# Patient Record
Sex: FEMALE | Race: ASIAN | NOT HISPANIC OR LATINO | Employment: PART TIME | URBAN - METROPOLITAN AREA
[De-identification: names, ages, dates, MRNs, and addresses within clinical notes are randomized per-mention and may not be internally consistent; named-entity substitution may affect disease eponyms.]

---

## 2017-07-28 ENCOUNTER — OFFICE VISIT - HEALTHEAST (OUTPATIENT)
Dept: FAMILY MEDICINE | Facility: CLINIC | Age: 42
End: 2017-07-28

## 2017-07-28 DIAGNOSIS — Z00.129 ROUTINE INFANT OR CHILD HEALTH CHECK: ICD-10-CM

## 2017-07-28 DIAGNOSIS — R10.31 RLQ ABDOMINAL PAIN: ICD-10-CM

## 2017-07-28 LAB
CHOLEST SERPL-MCNC: 175 MG/DL
FASTING STATUS PATIENT QL REPORTED: YES
HBA1C MFR BLD: 5.7 % (ref 3.5–6)
HDLC SERPL-MCNC: 43 MG/DL
LDLC SERPL CALC-MCNC: 108 MG/DL
TRIGL SERPL-MCNC: 120 MG/DL

## 2017-07-28 ASSESSMENT — MIFFLIN-ST. JEOR: SCORE: 1301.33

## 2017-08-03 ENCOUNTER — HOSPITAL ENCOUNTER (OUTPATIENT)
Dept: MAMMOGRAPHY | Facility: HOSPITAL | Age: 42
Discharge: HOME | End: 2017-08-03
Attending: FAMILY MEDICINE

## 2017-08-03 ENCOUNTER — HOSPITAL ENCOUNTER (OUTPATIENT)
Dept: ULTRASOUND IMAGING | Facility: HOSPITAL | Age: 42
Discharge: HOME | End: 2017-08-03
Attending: FAMILY MEDICINE

## 2017-08-03 DIAGNOSIS — Z12.31 VISIT FOR SCREENING MAMMOGRAM: ICD-10-CM

## 2017-08-03 DIAGNOSIS — R10.31 RLQ ABDOMINAL PAIN: ICD-10-CM

## 2017-08-03 LAB
HPV INTERPRETATION - HISTORICAL: NORMAL
HPV INTERPRETER - HISTORICAL: NORMAL

## 2017-08-04 LAB
BKR LAB AP ABNORMAL BLEEDING: NO
BKR LAB AP BIRTH CONTROL/HORMONES: NORMAL
BKR LAB AP CERVICAL APPEARANCE: NORMAL
BKR LAB AP GYN ADEQUACY: NORMAL
BKR LAB AP GYN INTERPRETATION: NORMAL
BKR LAB AP HPV REFLEX: NORMAL
BKR LAB AP LMP: NORMAL
BKR LAB AP PATIENT STATUS: NO
BKR LAB AP PREVIOUS ABNORMAL: NO
BKR LAB AP PREVIOUS NORMAL: NORMAL
HIGH RISK?: NO
PATH REPORT.COMMENTS IMP SPEC: NORMAL
RESULT FLAG (HE HISTORICAL CONVERSION): NORMAL

## 2017-10-17 ENCOUNTER — AMBULATORY - HEALTHEAST (OUTPATIENT)
Dept: NURSING | Facility: CLINIC | Age: 42
End: 2017-10-17

## 2017-10-17 DIAGNOSIS — Z23 IMMUNIZATION DUE: ICD-10-CM

## 2019-12-06 ENCOUNTER — AMBULATORY - HEALTHEAST (OUTPATIENT)
Dept: NURSING | Facility: CLINIC | Age: 44
End: 2019-12-06

## 2021-05-28 ENCOUNTER — HOSPITAL ENCOUNTER (OUTPATIENT)
Dept: MAMMOGRAPHY | Facility: CLINIC | Age: 46
Discharge: HOME | End: 2021-05-28
Attending: OBSTETRICS & GYNECOLOGY

## 2021-05-28 DIAGNOSIS — Z12.31 VISIT FOR SCREENING MAMMOGRAM: ICD-10-CM

## 2021-05-29 ENCOUNTER — RECORDS - HEALTHEAST (OUTPATIENT)
Dept: ADMINISTRATIVE | Facility: CLINIC | Age: 46
End: 2021-05-29

## 2021-05-31 VITALS — BODY MASS INDEX: 28.52 KG/M2 | WEIGHT: 155 LBS | HEIGHT: 62 IN

## 2021-06-03 ENCOUNTER — RECORDS - HEALTHEAST (OUTPATIENT)
Dept: ADMINISTRATIVE | Facility: CLINIC | Age: 46
End: 2021-06-03

## 2021-06-12 NOTE — PROGRESS NOTES
Assessment/ Plan     1. Routine infant or child health check  As far as healthcare maintenance, patient states she is due for Pap smear today.  She is up-to-date on immunizations.  She does not think she has ever had blood work done before, so we will do fasting labs.  Her mother  of unknown health problems at an early age in [facility].  Patient is not sure if she had hypertension or diabetes.  Will screen for diabetes and high cholesterol.  - Comprehensive Metabolic Panel  - Glycosylated Hemoglobin A1c  - Gynecologic Cytology (PAP Smear)  - HM2(CBC w/o Differential)  - Lipid Cascade  - Thyroid Stimulating Hormone (TSH)  - Vitamin D, Total (25-Hydroxy)    2. RLQ abdominal pain  Patient has had several month history of right lower quadrant abdominal pain/pelvic pain.  She states she has had ovarian cyst in the past.  Will check an ultrasound with further plan pending those results.  - US Pelvis With Transvaginal Non OB; Future  - Comprehensive Metabolic Panel    Subjective:     Rd Hyman is a 42 y.o. female who presents for an annual exam.  Overall, she is doing quite well.  It has been 4 years since I last saw her.  Had a daughter who is now 3 years old.  She had been dealing with some infertility.  She states she feels well other than some right lower quadrant/pelvic pain.  She states it feels very deep.  It tends to be worse around the time of her menses.  She does get a regular period every month.  She is not on any birth control.  She does not think that she could be pregnant.  She denies any dysuria, urgency, or frequency.  No vaginal discharge or irritation.  No new sexual partners.  She has never had fasting blood work done.  She states both her parents  young.  Her father was a general in [facility] and was murdered when her mother was pregnant with her.  Her mom  fairly young in [facility] of undiagnosed medical issues.  Patient is a non-smoker.    Healthy Habits:   Regular Exercise: Yes  Sunscreen Use:  No  Healthy Diet: Yes  Dental Visits Regularly: Yes  Seat Belt: Yes  Sexually active: Yes  Self Breast Exam Monthly:No  Colonoscopy: N/A  Lipid Profile: Yes  Glucose Screen: Yes  Prevention of Osteoporosis: N/A  Last Dexa: N/A        Immunization History   Administered Date(s) Administered     Influenza, seasonal,quad inj 36+ mos 11/16/2016     Influenza, seasonal,quad inj 6-35 mos 10/03/2011, 01/04/2013, 11/21/2014     Tdap 01/21/2014     Immunization status: up to date and documented.     Gynecologic History  Patient's last menstrual period was 07/14/2017 (approximate).  Contraception: none  Last Pap: unknown.    OB History   No data available       Current Outpatient Prescriptions   Medication Sig Dispense Refill     PRENATAL VITS W-CA,FE,FA,<1MG, (PRENATAL VITAMIN ORAL) Take 1 tablet by mouth daily.       No current facility-administered medications for this visit.      Past Medical History:   Diagnosis Date     Infertility      Past Surgical History:   Procedure Laterality Date     DILATION AND CURETTAGE OF UTERUS  06/10/2014     DILATION AND CURETTAGE OF UTERUS N/A 6/10/2014    Procedure: DILATION AND CURETTAGE;  Surgeon: Toni Shepard MD;  Location: [facility];  Service:      Review of patient's allergies indicates no known allergies.  No family history on file.  Social History     Social History     Marital status:      Spouse name: N/A     Number of children: N/A     Years of education: N/A     Occupational History     Not on file.     Social History Main Topics     Smoking status: Never Smoker     Smokeless tobacco: Never Used     Alcohol use Not on file     Drug use: Not on file     Sexual activity: Not on file     Other Topics Concern     Not on file     Social History Narrative       Review of Systems  General:  Denies problems  Eyes:  Denies problems  Ears/Nose/Throat:  Denies problems  Cardiovascular:  Denies problems  Respiratory:  Denies problems  Gastrointestinal:  Denies  "problems  Genitourinary:  Denies problems  Musculoskeletal:  Denies problems  Skin:  Denies problems  Neurologic:  Denies problems  Psychiatric:  Denies problems  Endocrine:  Denies problems  Heme/Lymphatic:  Denies problems  Allergic/Immunologic:  Denies problems    Objective:      Vitals:    07/28/17 0758   BP: 100/66   Patient Site: Right Arm   Patient Position: Sitting   Cuff Size: Adult Regular   Pulse: 72   Resp: 16   Temp: 97.8  F (36.6  C)   TempSrc: Oral   Weight: 155 lb (70.3 kg)   Height: 5' 2\" (1.575 m)       Physical Exam:  General Appearance: Alert, cooperative, no distress, appears stated age  Head: Normocephalic, without obvious abnormality, atraumatic  Eyes: PERRL, conjunctiva/corneas clear, EOM's intact  Ears: Normal TM's and external ear canals, both ears  Nose: Nares normal, septum midline,mucosa normal, no drainage  Throat: Lips, mucosa, and tongue normal; teeth and gums normal  Neck: Supple, symmetrical, trachea midline, no adenopathy; thyroid: not enlarged, symmetric, no tenderness/mass/nodules; no carotid bruit  Back: Symmetric, no curvature, ROM normal, no CVA tenderness  Lungs: Clear to auscultation bilaterally, respirations unlabored  Breasts: No breast masses, tenderness, asymmetry, or nipple discharge  Heart: Regular rate and rhythm, S1 and S2 normal, no murmur, rub, or gallop, Abdomen: Soft, non-tender, bowel sounds active all four quadrants,  no masses, no organomegaly  Pelvic: Normally developed genitalia with no external lesions or eruptions. Vagina and cervix show no lesions, inflammation, discharge or tenderness. Uterus normal to palpation.  No adnexal mass. mild tenderness right pelvic area.  Extremities: Extremities normal, atraumatic, no cyanosis or edema  Skin: Skin color, texture, turgor normal, no rashes or lesions  Lymph nodes: Cervical, supraclavicular, and axillary nodes normal  Neurologic: Normal        The following high BMI interventions were performed this visit: " encouragement to exercise and dietary needs education    Results for orders placed or performed in visit on 07/28/17   Glycosylated Hemoglobin A1c   Result Value Ref Range    Hemoglobin A1c 5.7 3.5 - 6.0 %   HM2(CBC w/o Differential)   Result Value Ref Range    WBC 8.5 4.0 - 11.0 thou/uL    RBC 4.69 3.80 - 5.40 mill/uL    Hemoglobin 13.2 12.0 - 16.0 g/dL    Hematocrit 39.9 35.0 - 47.0 %    MCV 85 80 - 100 fL    MCH 28.1 27.0 - 34.0 pg    MCHC 33.1 32.0 - 36.0 g/dL    RDW 12.1 11.0 - 14.5 %    Platelets 202 140 - 440 thou/uL    MPV 8.7 7.0 - 10.0 fL       Kellie Torres MD    This note has been dictated using voice recognition software. Any grammatical or context distortions are unintentional and inherent to the software

## 2021-06-26 ENCOUNTER — HEALTH MAINTENANCE LETTER (OUTPATIENT)
Age: 46
End: 2021-06-26

## 2021-10-16 ENCOUNTER — HEALTH MAINTENANCE LETTER (OUTPATIENT)
Age: 46
End: 2021-10-16

## 2022-04-05 ENCOUNTER — TELEPHONE (OUTPATIENT)
Dept: FAMILY MEDICINE | Facility: CLINIC | Age: 47
End: 2022-04-05
Payer: COMMERCIAL

## 2022-04-05 DIAGNOSIS — Z11.1 SCREENING EXAMINATION FOR PULMONARY TUBERCULOSIS: Primary | ICD-10-CM

## 2022-04-05 NOTE — TELEPHONE ENCOUNTER
Reason for Call: Request for an order or referral:    Order or referral being requested: order    Date needed: as soon as possible    Has the patient been seen by the PCP for this problem? YES    Additional comments: Pt is scheduled for a mantoux test scheduled for 4/6 and she would like to get the blood test instead. She will need an order to switch her lab work for tomorrow.    Phone number Patient can be reached at:  Cell number on file:    Telephone Information:   Mobile [phone]       Best Time:  Anytime    Can we leave a detailed message on this number?  YES    Call taken on 4/5/2022 at 2:54 PM by Kaylynn Lopez

## 2022-04-06 ENCOUNTER — LAB (OUTPATIENT)
Dept: LAB | Facility: CLINIC | Age: 47
End: 2022-04-06
Payer: COMMERCIAL

## 2022-04-06 DIAGNOSIS — Z11.1 SCREENING EXAMINATION FOR PULMONARY TUBERCULOSIS: ICD-10-CM

## 2022-04-06 PROCEDURE — 86481 TB AG RESPONSE T-CELL SUSP: CPT

## 2022-04-06 PROCEDURE — 36415 COLL VENOUS BLD VENIPUNCTURE: CPT

## 2022-04-08 ENCOUNTER — TELEPHONE (OUTPATIENT)
Dept: FAMILY MEDICINE | Facility: CLINIC | Age: 47
End: 2022-04-08
Payer: COMMERCIAL

## 2022-04-08 LAB
GAMMA INTERFERON BACKGROUND BLD IA-ACNC: 0.21 IU/ML
M TB IFN-G BLD-IMP: POSITIVE
M TB IFN-G CD4+ BCKGRND COR BLD-ACNC: 9.79 IU/ML
MITOGEN IGNF BCKGRD COR BLD-ACNC: 0.01 IU/ML
MITOGEN IGNF BCKGRD COR BLD-ACNC: 2.24 IU/ML
QUANTIFERON MITOGEN: 10 IU/ML
QUANTIFERON NIL TUBE: 0.21 IU/ML
QUANTIFERON TB1 TUBE: 0.22 IU/ML
QUANTIFERON TB2 TUBE: 2.45

## 2022-04-08 NOTE — TELEPHONE ENCOUNTER
----- Message from Kellie Torres sent at 4/8/2022 12:59 PM CDT -----  Let pt know this is positive.  I have not seen her in almost 5 years so she needs to schedule an appointment for further evaluation.

## 2022-04-11 ENCOUNTER — TELEPHONE (OUTPATIENT)
Dept: FAMILY MEDICINE | Facility: CLINIC | Age: 47
End: 2022-04-11
Payer: COMMERCIAL

## 2022-04-11 NOTE — TELEPHONE ENCOUNTER
Reason for call:  Other   Patient called regarding (reason for call): appointment  Additional comments: Pt directed to schedule follow up regarding positive TB test, unable to find appt with PCP until 5/12, please call spouse to schedule sooner if available    Phone number to reach patient:  Other phone number:  [phone]    Best Time:  any    Can we leave a detailed message on this number?  YES    Travel screening: Positive: unable to schedule an appointment, due to positive travel screen.  Informed patient/caller that a message will be sent to the clinic; who will then call them back to discuss next steps.     Patient screened positive for: OtherTB

## 2022-04-13 NOTE — TELEPHONE ENCOUNTER
You could use one of my holds next week.  She will need a chest x-ray and repeat blood work.  Please make this a 40-minute appointment.

## 2022-04-19 ENCOUNTER — OFFICE VISIT (OUTPATIENT)
Dept: FAMILY MEDICINE | Facility: CLINIC | Age: 47
End: 2022-04-19
Payer: COMMERCIAL

## 2022-04-19 VITALS
RESPIRATION RATE: 16 BRPM | WEIGHT: 171.6 LBS | HEIGHT: 62 IN | SYSTOLIC BLOOD PRESSURE: 112 MMHG | BODY MASS INDEX: 31.58 KG/M2 | HEART RATE: 85 BPM | DIASTOLIC BLOOD PRESSURE: 76 MMHG

## 2022-04-19 DIAGNOSIS — R76.12 POSITIVE QUANTIFERON-TB GOLD TEST: Primary | ICD-10-CM

## 2022-04-19 PROCEDURE — 36415 COLL VENOUS BLD VENIPUNCTURE: CPT | Performed by: FAMILY MEDICINE

## 2022-04-19 PROCEDURE — 99203 OFFICE O/P NEW LOW 30 MIN: CPT | Performed by: FAMILY MEDICINE

## 2022-04-19 PROCEDURE — 86481 TB AG RESPONSE T-CELL SUSP: CPT | Performed by: FAMILY MEDICINE

## 2022-04-19 RX ORDER — MULTIPLE VITAMINS W/ MINERALS TAB 9MG-400MCG
TAB ORAL DAILY
COMMUNITY

## 2022-04-19 RX ORDER — CHOLECALCIFEROL (VITAMIN D3) 25 MCG
CAPSULE ORAL
COMMUNITY

## 2022-04-19 RX ORDER — LORATADINE 10 MG/1
10 TABLET ORAL DAILY
COMMUNITY

## 2022-04-19 NOTE — LETTER
April 19, 2022      Rd Hyman  [address]  [place] [facility] [place] [address]        To Whom It May Concern:    Rd Hyman  was seen on 4/19/22.  She has a positive quantiferron gold.  She is not having any symptoms and has a normal chest x-ray.  Therefore, she is not considered contagious and may return to work.      Sincerely,        Kellie Torres

## 2022-04-19 NOTE — PROGRESS NOTES
Assessment/ Plan     1. Positive QuantiFERON-TB Gold test  Paulina presents for follow-up of a positive QuantiFERON gold.  She is completely asymptomatic and her chest x-ray is normal.  I repeated the QuantiFERON gold and it came back negative.  I think her initial was a false positive.  No further work-up needed at this time.  - Quantiferon-TB Gold Plus; Future  - XR Chest 2 Views; Future  - Quantiferon-TB Gold Plus      Subjective:      Rd Hyman is a 46 year old female who presents for follow-up of a positive QuantiFERON gold test.  I had not seen her for over a year, this was requested as part of her employment.  I wanted her to come into asked some more specific questions.  She was born in [facility] [facility].  She moved here when she was about 20 years old.  She does not remember being sick as a child, but she remembers her mom telling her that her first year of life, she had a bad cough and some breathing issues.  She has last travel to [place] in .  She has not been there since then as that is when her mother .  Her father  before she was born.  She currently does not any symptoms at all.  She does not remember having a cough or fever anytime in the recent past.  This was the first QuantiFERON gold test that she has had as she is now working in home care.  She does feel like when she first came to the country that she has had some type of screening for tuberculosis and does not remember anyone ever telling her it was positive.  She never had to take any pills or medication for prolonged period of time.  She is a non-smoker.    Relevant past medical, family, surgical, and social history reviewed with patient, unless noted in HPI, not pertinent for this visit.  Medications were discussed and reconciled.   Review of Systems   A 12 point comprehensive review of systems was negative except as noted.      Current Outpatient Medications   Medication Sig Dispense Refill     Cholecalciferol (VITAMIN D-3) 25 MCG (1000 UT)  "CAPS        loratadine (CLARITIN) 10 MG tablet Take 10 mg by mouth daily       Misc Natural Products (FOCUSED MIND PO)        multivitamin w/minerals (MULTI-VITAMIN) tablet Take by mouth daily           Objective:     /76   Pulse 85   Resp 16   Ht 1.575 m (5' 2\")   Wt 77.8 kg (171 lb 9.6 oz)   LMP  (LMP Unknown)   BMI 31.39 kg/m      Body mass index is 31.39 kg/m .       General appearance: alert, appears stated age and cooperative    Lungs: clear to auscultation bilaterally  Heart: regular rate and rhythm, S1, S2 normal, no murmur, click, rub or gallop       Chest x-ray: Personally reviewed, negative for infiltrate or any other abnormalities.  No results found for this or any previous visit (from the past 168 hour(s)).       This note has been dictated using voice recognition software. Any grammatical or context distortions are unintentional and inherent to the software  Answers for HPI/ROS submitted by the patient on 4/19/2022  How many servings of fruits and vegetables do you eat daily?: 2-3  On average, how many sweetened beverages do you drink each day (Examples: soda, juice, sweet tea, etc.  Do NOT count diet or artificially sweetened beverages)?: 1  What is the reason for your visit today?: TB screening  When did your symptoms begin?: More than a month      "

## 2022-04-21 LAB
GAMMA INTERFERON BACKGROUND BLD IA-ACNC: 0.18 IU/ML
M TB IFN-G BLD-IMP: NEGATIVE
M TB IFN-G CD4+ BCKGRND COR BLD-ACNC: 9.82 IU/ML
MITOGEN IGNF BCKGRD COR BLD-ACNC: -0.04 IU/ML
MITOGEN IGNF BCKGRD COR BLD-ACNC: -0.05 IU/ML
QUANTIFERON MITOGEN: 10 IU/ML
QUANTIFERON NIL TUBE: 0.18 IU/ML
QUANTIFERON TB1 TUBE: 0.14 IU/ML
QUANTIFERON TB2 TUBE: 0.13

## 2022-05-22 ENCOUNTER — HEALTH MAINTENANCE LETTER (OUTPATIENT)
Age: 47
End: 2022-05-22

## 2022-09-25 ENCOUNTER — HEALTH MAINTENANCE LETTER (OUTPATIENT)
Age: 47
End: 2022-09-25

## 2022-10-17 ENCOUNTER — OFFICE VISIT (OUTPATIENT)
Dept: FAMILY MEDICINE | Facility: CLINIC | Age: 47
End: 2022-10-17
Payer: COMMERCIAL

## 2022-10-17 VITALS
WEIGHT: 169.8 LBS | HEIGHT: 62 IN | DIASTOLIC BLOOD PRESSURE: 83 MMHG | HEART RATE: 99 BPM | RESPIRATION RATE: 16 BRPM | BODY MASS INDEX: 31.25 KG/M2 | SYSTOLIC BLOOD PRESSURE: 111 MMHG

## 2022-10-17 DIAGNOSIS — R20.0 NUMBNESS AND TINGLING IN RIGHT HAND: ICD-10-CM

## 2022-10-17 DIAGNOSIS — R20.2 NUMBNESS AND TINGLING IN RIGHT HAND: ICD-10-CM

## 2022-10-17 DIAGNOSIS — M77.11 RIGHT LATERAL EPICONDYLITIS: Primary | ICD-10-CM

## 2022-10-17 PROCEDURE — 90686 IIV4 VACC NO PRSV 0.5 ML IM: CPT | Performed by: FAMILY MEDICINE

## 2022-10-17 PROCEDURE — 90471 IMMUNIZATION ADMIN: CPT | Performed by: FAMILY MEDICINE

## 2022-10-17 PROCEDURE — 91312 COVID-19,PF,PFIZER BOOSTER BIVALENT: CPT | Performed by: FAMILY MEDICINE

## 2022-10-17 PROCEDURE — 0124A COVID-19,PF,PFIZER BOOSTER BIVALENT: CPT | Performed by: FAMILY MEDICINE

## 2022-10-17 PROCEDURE — 99213 OFFICE O/P EST LOW 20 MIN: CPT | Mod: 25 | Performed by: FAMILY MEDICINE

## 2022-10-18 NOTE — PROGRESS NOTES
Assessment/ Plan     1. Right lateral epicondylitis  Paulina presents with about a 2-month history of signs and symptoms consistent with right lateral epicondylitis.  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  We discussed conservative therapy including aggressive icing, anti-inflammatories, exercising and stretching.  I gave her handout.  She can look online if she wants to try a tennis elbow brace to see if that is helpful.  If she is not having any success over the next couple of weeks with these conservative measures, she will let me know and I would prescribe formal physical therapy.    2. Numbness and tingling in right hand  She is having little bit of numbness in her fourth and fifth fingers on the right.  This may be an ulnar neuropathy either at the wrist or elbow or possibly coming from her neck.  We will continue to monitor for now as she has no weakness.  If she has continued ongoing symptoms or worsening symptoms, we could consider an EMG to further delineate.      Subjective:      Rd Hyman is a 47 year old female who presents for arm pain.  She states for the last couple of months she has had pain in her right forearm, wrist, and elbow area.  She works 2 jobs.  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  She is constantly washing dishes and doing things with her arms.  Denies any trauma.  She feels like sometimes the pain is bad that she cannot hold things or grabs things.  She has some numbness in her fourth and fifth fingers.  She has had some neck issues as well.  She attributes this to making a bed for a client when she was doing home care, stretching in an unusual position and she heard a crack in her neck.  She just cannot has some overall just tightness in her neck.    Relevant past medical, family, surgical, and social history reviewed with patient, unless noted in HPI, not pertinent for this visit.  Medications  "were discussed and reconciled.   Review of Systems   A 12 point comprehensive review of systems was negative except as noted.      Current Outpatient Medications   Medication Sig Dispense Refill     Cholecalciferol (VITAMIN D-3) 25 MCG (1000 UT) CAPS        loratadine (CLARITIN) 10 MG tablet Take 10 mg by mouth daily       Misc Natural Products (FOCUSED MIND PO)        multivitamin w/minerals (THERA-VIT-M) tablet Take by mouth daily           Objective:     /83   Pulse 99   Resp 16   Ht 1.575 m (5' 2\")   Wt 77 kg (169 lb 12.8 oz)   BMI 31.06 kg/m      Body mass index is 31.06 kg/m .       General appearance: alert, appears stated age and cooperative  Lungs: clear to auscultation bilaterally  Heart: regular rate and rhythm, S1, S2 normal, no murmur, click, rub or gallop     Right arm: She is exquisitely tender over the lateral epicondyle.  She is little bit of tenderness over the extensor tendons of the wrist.  There is no weakness of the hand, her strength is 5 out of 5.  Is a little bit of tenderness in the ulnar notch.    No results found for this or any previous visit (from the past 168 hour(s)).       This note has been dictated using voice recognition software. Any grammatical or context distortions are unintentional and inherent to the software  "

## 2023-05-03 ENCOUNTER — HOSPITAL ENCOUNTER (EMERGENCY)
Facility: CLINIC | Age: 48
Discharge: HOME | End: 2023-05-03
Attending: EMERGENCY MEDICINE | Admitting: EMERGENCY MEDICINE
Payer: COMMERCIAL

## 2023-05-03 ENCOUNTER — NURSE TRIAGE (OUTPATIENT)
Dept: NURSING | Facility: CLINIC | Age: 48
End: 2023-05-03
Payer: COMMERCIAL

## 2023-05-03 VITALS
SYSTOLIC BLOOD PRESSURE: 119 MMHG | RESPIRATION RATE: 20 BRPM | HEIGHT: 60 IN | OXYGEN SATURATION: 96 % | HEART RATE: 89 BPM | TEMPERATURE: 98.1 F | BODY MASS INDEX: 32.79 KG/M2 | DIASTOLIC BLOOD PRESSURE: 87 MMHG | WEIGHT: 167 LBS

## 2023-05-03 DIAGNOSIS — J35.8 TONSIL STONE: ICD-10-CM

## 2023-05-03 DIAGNOSIS — J02.0 STREPTOCOCCAL PHARYNGITIS: ICD-10-CM

## 2023-05-03 LAB — GROUP A STREP BY PCR: DETECTED

## 2023-05-03 PROCEDURE — 250N000009 HC RX 250: Performed by: EMERGENCY MEDICINE

## 2023-05-03 PROCEDURE — 99283 EMERGENCY DEPT VISIT LOW MDM: CPT | Performed by: EMERGENCY MEDICINE

## 2023-05-03 PROCEDURE — 87651 STREP A DNA AMP PROBE: CPT | Performed by: EMERGENCY MEDICINE

## 2023-05-03 PROCEDURE — 99284 EMERGENCY DEPT VISIT MOD MDM: CPT | Performed by: EMERGENCY MEDICINE

## 2023-05-03 PROCEDURE — 250N000013 HC RX MED GY IP 250 OP 250 PS 637: Performed by: EMERGENCY MEDICINE

## 2023-05-03 RX ORDER — AMOXICILLIN 500 MG/1
500 CAPSULE ORAL 2 TIMES DAILY
Qty: 30 CAPSULE | Refills: 0 | Status: SHIPPED | OUTPATIENT
Start: 2023-05-03 | End: 2023-05-03

## 2023-05-03 RX ORDER — AMOXICILLIN 500 MG/1
500 CAPSULE ORAL 2 TIMES DAILY
Qty: 30 CAPSULE | Refills: 0 | Status: SHIPPED | OUTPATIENT
Start: 2023-05-03 | End: 2024-03-18

## 2023-05-03 RX ORDER — DEXAMETHASONE SODIUM PHOSPHATE 4 MG/ML
10 VIAL (ML) INJECTION ONCE
Status: COMPLETED | OUTPATIENT
Start: 2023-05-03 | End: 2023-05-03

## 2023-05-03 RX ADMIN — DEXAMETHASONE SODIUM PHOSPHATE 10 MG: 4 INJECTION, SOLUTION INTRAMUSCULAR; INTRAVENOUS at 18:46

## 2023-05-03 RX ADMIN — PHENOL 1 ML: 1.5 LIQUID ORAL at 19:55

## 2023-05-03 ASSESSMENT — ACTIVITIES OF DAILY LIVING (ADL)
ADLS_ACUITY_SCORE: 35
ADLS_ACUITY_SCORE: 35

## 2023-05-03 NOTE — TELEPHONE ENCOUNTER
"Triage Call:    Patient's spouse calling to report sore throat.  Verbal consent to communicate given by patient.  Patient has had a sore throat for a couple of days. She has a fever up to 101.  He reports seeing a large white \"tonsil stone\".  Patient reports difficulty swallowing saliva due to swelling.  Denies difficulty breathing.    Stephanie Gann RN  05/03/23 5:09 PM  [facility] Nurse Advisor    Reason for Disposition    [1] Drooling or spitting out saliva (because can't swallow) AND [2] normal breathing    Additional Information    Negative: SEVERE difficulty breathing (e.g., struggling for each breath, speaks in single words, stridor)    Negative: Sounds like a life-threatening emergency to the triager    Negative: [1] Diagnosed strep throat AND [2] taking antibiotic AND [3] symptoms continue    Negative: Throat culture results, call about    Negative: Productive cough is main symptom    Negative: Non-productive cough is main symptom    Negative: Hoarseness is main symptom    Negative: Runny nose is main symptom    Negative: Uvula swelling is main symptom    Protocols used: SORE THROAT-A-AH      "

## 2023-05-03 NOTE — ED TRIAGE NOTES
Patient states sore thrao and fever with occasional HA that started Sunday night     Triage Assessment     Row Name 05/03/23 5774       Triage Assessment (Adult)    Airway WDL WDL       Respiratory WDL    Respiratory WDL WDL       Skin Circulation/Temperature WDL    Skin Circulation/Temperature WDL WDL       Cardiac WDL    Cardiac WDL WDL       Peripheral/Neurovascular WDL    Peripheral Neurovascular WDL WDL       Cognitive/Neuro/Behavioral WDL    Cognitive/Neuro/Behavioral WDL WDL

## 2023-05-03 NOTE — ED TRIAGE NOTES
Took Tylenol at 1500     Triage Assessment     Row Name 05/03/23 1750       Triage Assessment (Adult)    Airway WDL WDL       Respiratory WDL    Respiratory WDL WDL       Skin Circulation/Temperature WDL    Skin Circulation/Temperature WDL WDL       Cardiac WDL    Cardiac WDL WDL       Peripheral/Neurovascular WDL    Peripheral Neurovascular WDL WDL       Cognitive/Neuro/Behavioral WDL    Cognitive/Neuro/Behavioral WDL WDL

## 2023-05-03 NOTE — ED PROVIDER NOTES
History     Chief Complaint   Patient presents with     Pharyngitis     HPI  Rd Hyman is a 47 year old female with no significant past medical history presents emergency department complaining of low-grade fevers and sore throat.  Patient states she is had previous stone in her tonsils and states it feels like there is no one there.  She has pain with with swallowing.  She has not had any ear pain cough chest pain abdominal pain back pain focal numbness weakness any extremity other symptoms.  She is able to drink water but has trouble swallowing food.    Allergies:  No Known Allergies    Problem List:    There are no problems to display for this patient.       Past Medical History:    No past medical history on file.    Past Surgical History:    No past surgical history on file.    Family History:    No family history on file.    Social History:  Marital Status:   [2]        Medications:    No current outpatient medications on file.        Review of Systems  All systems reviewed and other than pertinent positives and negatives in HPI all other systems are negative.  Physical Exam   BP: 119/87  Pulse: 89  Temp: 98.1  F (36.7  C)  Resp: 20  Height: 152.4 cm (5')  Weight: 75.8 kg (167 lb)  SpO2: 96 %      Physical Exam  Vitals and nursing note reviewed.   Constitutional:       General: She is not in acute distress.     Appearance: She is well-developed. She is not ill-appearing, toxic-appearing or diaphoretic.   HENT:      Head: Normocephalic and atraumatic.      Right Ear: Tympanic membrane normal.      Left Ear: Tympanic membrane normal.      Nose: No congestion.      Mouth/Throat:      Pharynx: No uvula swelling.      Comments: Posterior pharynx with moderate erythema edema question exudate in the left tonsil versus tonsillar stone.  No obvious evidence of abscess.  Neck:      Comments: Positive anterior superior cervical adenopathy left greater than right.  Cardiovascular:      Rate and Rhythm: Normal  rate and regular rhythm.      Pulses: Normal pulses.      Heart sounds: Normal heart sounds.   Pulmonary:      Effort: Pulmonary effort is normal.      Breath sounds: Normal breath sounds. No stridor. No wheezing or rhonchi.   Abdominal:      General: Abdomen is flat. Bowel sounds are normal. There is no distension.      Palpations: Abdomen is soft.      Tenderness: There is no abdominal tenderness. There is no right CVA tenderness or left CVA tenderness.   Musculoskeletal:         General: No swelling or tenderness. Normal range of motion.      Cervical back: Normal range of motion and neck supple.      Right lower leg: No edema.      Left lower leg: No edema.   Skin:     General: Skin is warm and dry.      Capillary Refill: Capillary refill takes less than 2 seconds.      Findings: No rash.   Neurological:      General: No focal deficit present.      Mental Status: She is alert and oriented to person, place, and time.   Psychiatric:         Mood and Affect: Mood normal.         ED Course                 Procedures              Critical Care time:  none               Results for orders placed or performed during the hospital encounter of 05/03/23 (from the past 24 hour(s))   Group A Streptococcus PCR Throat Swab    Specimen: Throat; Swab   Result Value Ref Range    Group A strep by PCR Detected (A) Not Detected    Narrative    The Xpert Xpress Strep A test, performed on the [employer], is a rapid, qualitative in vitro diagnostic test for the detection of Streptococcus pyogenes (Group A ß-hemolytic Streptococcus, Strep A) in throat swab specimens from patients with signs and symptoms of pharyngitis. The Xpert Xpress Strep A test can be used as an aid in the diagnosis of Group A Streptococcal pharyngitis. The assay is not intended to monitor treatment for Group A Streptococcus infections. The Xpert Xpress Strep A test utilizes an automated real-time polymerase chain reaction (PCR) to detect  Streptococcus pyogenes DNA.       Medications   dexamethasone (DECADRON) injectable solution used ORALLY 10 mg (10 mg Oral $Given 5/3/23 4666)       Assessments & Plan (with Medical Decision Making) records were reviewed.  Including past medical history medications and allergies.  Strep screen was obtained.  Patient was given Decadron.  Patient has not had any recent clinical visits.  Strep screen was positive.  I tried to numb the patient's posterior throat up to get a better view of the possible stone versus exudate but patient is gag reflex continue to be strong.  I discussed the case with Dr. Hernandez with ENT and he stated we should treat the patient for the strep and have her follow-up in clinic for further assessment and care.  He did not feel this was an emergent situation.  She should gargle with salt water try throat lozenges and return if difficulty swallowing fevers not controlled with ibuprofen or Tylenol shortness of breath or other symptoms present.  Patient is feeling better at this time and feels comfortable with this plan.     I have reviewed the nursing notes.    I have reviewed the findings, diagnosis, plan and need for follow up with the patient.           Discharge Medication List as of 5/3/2023  9:19 PM      START taking these medications    Details   amoxicillin (AMOXIL) 500 MG capsule Take 1 capsule (500 mg) by mouth 2 times daily, Disp-30 capsule, R-0, [employer]             Final diagnoses:   Streptococcal pharyngitis   Tonsil stone - possible       5/3/2023   [facility] EMERGENCY DEPT     Nica, Josemanuel Chowdary MD  05/04/23 1791

## 2023-05-04 NOTE — DISCHARGE INSTRUCTIONS
Return if symptoms worsen or new symptoms develop.  Follow-up with primary care physician next available.  Drink plenty fluids.  If increased shortness of breath, worsening pain or other symptoms present please return for further evaluation and care.  Take antibiotics as directed follow-up with ENT next available.  A referral has been made.

## 2023-05-08 ENCOUNTER — OFFICE VISIT (OUTPATIENT)
Dept: FAMILY MEDICINE | Facility: CLINIC | Age: 48
End: 2023-05-08
Payer: COMMERCIAL

## 2023-05-08 ENCOUNTER — HOSPITAL ENCOUNTER (OUTPATIENT)
Dept: CT IMAGING | Facility: HOSPITAL | Age: 48
Discharge: HOME | End: 2023-05-08
Attending: FAMILY MEDICINE | Admitting: FAMILY MEDICINE
Payer: COMMERCIAL

## 2023-05-08 VITALS
DIASTOLIC BLOOD PRESSURE: 84 MMHG | WEIGHT: 165.4 LBS | BODY MASS INDEX: 32.3 KG/M2 | OXYGEN SATURATION: 97 % | RESPIRATION RATE: 20 BRPM | TEMPERATURE: 97.7 F | HEART RATE: 82 BPM | SYSTOLIC BLOOD PRESSURE: 117 MMHG

## 2023-05-08 DIAGNOSIS — M54.9 LEFT-SIDED BACK PAIN, UNSPECIFIED BACK LOCATION, UNSPECIFIED CHRONICITY: Primary | ICD-10-CM

## 2023-05-08 DIAGNOSIS — M54.9 LEFT-SIDED BACK PAIN, UNSPECIFIED BACK LOCATION, UNSPECIFIED CHRONICITY: ICD-10-CM

## 2023-05-08 LAB
ALBUMIN SERPL BCG-MCNC: 3.9 G/DL (ref 3.5–5.2)
ALBUMIN UR-MCNC: NEGATIVE MG/DL
ALP SERPL-CCNC: 92 U/L (ref 35–104)
ALT SERPL W P-5'-P-CCNC: 24 U/L (ref 10–35)
ANION GAP SERPL CALCULATED.3IONS-SCNC: 10 MMOL/L (ref 7–15)
APPEARANCE UR: ABNORMAL
AST SERPL W P-5'-P-CCNC: 19 U/L (ref 10–35)
BACTERIA #/AREA URNS HPF: ABNORMAL /HPF
BILIRUB SERPL-MCNC: 0.4 MG/DL
BILIRUB UR QL STRIP: NEGATIVE
BUN SERPL-MCNC: 11.6 MG/DL (ref 6–20)
CALCIUM SERPL-MCNC: 9.5 MG/DL (ref 8.6–10)
CHLORIDE SERPL-SCNC: 101 MMOL/L (ref 98–107)
COLOR UR AUTO: YELLOW
CREAT SERPL-MCNC: 0.71 MG/DL (ref 0.51–0.95)
DEPRECATED HCO3 PLAS-SCNC: 27 MMOL/L (ref 22–29)
ERYTHROCYTE [DISTWIDTH] IN BLOOD BY AUTOMATED COUNT: 12.2 % (ref 10–15)
GFR SERPL CREATININE-BSD FRML MDRD: >90 ML/MIN/1.73M2
GLUCOSE SERPL-MCNC: 95 MG/DL (ref 70–99)
GLUCOSE UR STRIP-MCNC: NEGATIVE MG/DL
HCT VFR BLD AUTO: 40.9 % (ref 35–47)
HGB BLD-MCNC: 13.7 G/DL (ref 11.7–15.7)
HGB UR QL STRIP: NEGATIVE
KETONES UR STRIP-MCNC: NEGATIVE MG/DL
LEUKOCYTE ESTERASE UR QL STRIP: ABNORMAL
LIPASE SERPL-CCNC: 27 U/L (ref 13–60)
MCH RBC QN AUTO: 29 PG (ref 26.5–33)
MCHC RBC AUTO-ENTMCNC: 33.5 G/DL (ref 31.5–36.5)
MCV RBC AUTO: 87 FL (ref 78–100)
NITRATE UR QL: NEGATIVE
PH UR STRIP: 6.5 [PH] (ref 5–8)
PLATELET # BLD AUTO: 252 10E3/UL (ref 150–450)
POTASSIUM SERPL-SCNC: 4.4 MMOL/L (ref 3.4–5.3)
PROT SERPL-MCNC: 7.5 G/DL (ref 6.4–8.3)
RBC # BLD AUTO: 4.72 10E6/UL (ref 3.8–5.2)
RBC #/AREA URNS AUTO: ABNORMAL /HPF
SODIUM SERPL-SCNC: 138 MMOL/L (ref 136–145)
SP GR UR STRIP: 1.01 (ref 1–1.03)
SQUAMOUS #/AREA URNS AUTO: ABNORMAL /LPF
UROBILINOGEN UR STRIP-ACNC: 0.2 E.U./DL
WBC # BLD AUTO: 12.5 10E3/UL (ref 4–11)
WBC #/AREA URNS AUTO: ABNORMAL /HPF

## 2023-05-08 PROCEDURE — 80053 COMPREHEN METABOLIC PANEL: CPT | Performed by: FAMILY MEDICINE

## 2023-05-08 PROCEDURE — 83690 ASSAY OF LIPASE: CPT | Performed by: FAMILY MEDICINE

## 2023-05-08 PROCEDURE — 74177 CT ABD & PELVIS W/CONTRAST: CPT

## 2023-05-08 PROCEDURE — 250N000011 HC RX IP 250 OP 636: Performed by: FAMILY MEDICINE

## 2023-05-08 PROCEDURE — 36415 COLL VENOUS BLD VENIPUNCTURE: CPT | Performed by: FAMILY MEDICINE

## 2023-05-08 PROCEDURE — 81001 URINALYSIS AUTO W/SCOPE: CPT | Performed by: FAMILY MEDICINE

## 2023-05-08 PROCEDURE — 96372 THER/PROPH/DIAG INJ SC/IM: CPT | Performed by: FAMILY MEDICINE

## 2023-05-08 PROCEDURE — 85027 COMPLETE CBC AUTOMATED: CPT | Performed by: FAMILY MEDICINE

## 2023-05-08 PROCEDURE — 99214 OFFICE O/P EST MOD 30 MIN: CPT | Mod: 25 | Performed by: FAMILY MEDICINE

## 2023-05-08 RX ORDER — CYCLOBENZAPRINE HCL 5 MG
5 TABLET ORAL 3 TIMES DAILY PRN
Qty: 15 TABLET | Refills: 0 | Status: SHIPPED | OUTPATIENT
Start: 2023-05-08 | End: 2024-03-18

## 2023-05-08 RX ORDER — IOPAMIDOL 755 MG/ML
90 INJECTION, SOLUTION INTRAVASCULAR ONCE
Status: COMPLETED | OUTPATIENT
Start: 2023-05-08 | End: 2023-05-08

## 2023-05-08 RX ORDER — KETOROLAC TROMETHAMINE 30 MG/ML
30 INJECTION, SOLUTION INTRAMUSCULAR; INTRAVENOUS ONCE
Status: COMPLETED | OUTPATIENT
Start: 2023-05-08 | End: 2023-05-08

## 2023-05-08 RX ADMIN — IOPAMIDOL 90 ML: 755 INJECTION, SOLUTION INTRAVENOUS at 14:29

## 2023-05-08 RX ADMIN — KETOROLAC TROMETHAMINE 30 MG: 30 INJECTION, SOLUTION INTRAMUSCULAR; INTRAVENOUS at 13:15

## 2023-05-08 NOTE — PROGRESS NOTES
Assessment & Plan     Left-sided back pain, unspecified back location, unspecified chronicity  Probably MSK in nature. Multiple etiologies were evaluated including kidneys stones diverticulitis, pancreatitis but work up for these including urgent ct scan was normal except for mildly elevated white blood count. Conservative treatment measures discussed. Use of OTC  meds. discussed and use of flexeril. toradol improved pain but not gone.   - UA Macroscopic with reflex to Microscopic and Culture  - ketorolac (TORADOL) injection 30 mg  - UA Microscopic with Reflex to Culture  - CBC with platelets  - Comprehensive metabolic panel  - Lipase  - CT Abdomen Pelvis w Contrast  - CBC with platelets  - Comprehensive metabolic panel  - Lipase  - cyclobenzaprine (FLEXERIL) 5 MG tablet  Dispense: 15 tablet; Refill: 0       [id number]}    Return in about 3 days (around 5/11/2023) for follow up with your regular clinic if needed.  Discussed sooner follow up if nausea or vomiting or fever develops or worsening pain.   Cameron Delarosa MD  [facility]    ------------------------------------------------------------------------  Subjective     Rd Hyman presents to clinic today for the following health issues:  chief complaint  HPI  Acute onset of left flank pain that over time moved around to the left upper abdomen with sharp pain worse when moving/twisting/changing position. No associated nausea or vomiting nor urinary symptoms. Had one loose nonbloody stool today. No rash noted. No similar pain in the past. No injury nor overuse.       Review of Systems        Objective    /84 (BP Location: Right arm, Patient Position: Sitting, Cuff Size: Adult Regular)   Pulse 82   Temp 97.7  F (36.5  C) (Oral)   Resp 20   Wt 75 kg (165 lb 6.4 oz)   LMP 05/01/2023 (Exact Date)   SpO2 97%   BMI 32.30 kg/m    Physical Exam   GENERAL: healthy, alert and no distress  RESP: lungs clear to auscultation - no rales, rhonchi or wheezes  CV:  regular rate and rhythm, normal S1 S2, no S3 or S4, no murmur, click or rub, no peripheral edema and peripheral pulses strong  ABDOMEN: soft, nontender, no hepatosplenomegaly, no masses and bowel sounds normal  MS: no gross musculoskeletal defects noted, she has tenderness to palpation at the cva area on the left   SKIN: no suspicious lesions or rashes    Results for orders placed or performed during the hospital encounter of 05/08/23   CT Abdomen Pelvis w Contrast     Status: None    Narrative    EXAM: CT ABDOMEN PELVIS W CONTRAST  LOCATION: [facility]  DATE/TIME: 5/8/2023 2:25 PM CDT    INDICATION: left sdied flank pain and abd pain. acute onset  COMPARISON: 08/21/2006  TECHNIQUE: CT scan of the abdomen and pelvis was performed following injection of IV contrast. Multiplanar reformats were obtained. Dose reduction techniques were used.  CONTRAST: 90ml IV Isovue 370    FINDINGS:   LOWER CHEST: Normal.    HEPATOBILIARY: Normal.    PANCREAS: Normal.    SPLEEN: Normal.    ADRENAL GLANDS: Normal.    KIDNEYS/BLADDER: Normal left kidney. There is a 0.4 cm nonobstructing right renal calculus.    BOWEL: Normal. Normal appendix.    LYMPH NODES: Normal.    VASCULATURE: Unremarkable.    PELVIC ORGANS: Normal.    MUSCULOSKELETAL: Normal.      Impression    IMPRESSION:   1.  Nonobstructing 0.4 cm right renal calculus without hydronephrosis.   Results for orders placed or performed in visit on 05/08/23   UA Macroscopic with reflex to Microscopic and Culture     Status: Abnormal    Specimen: Urine, Clean Catch   Result Value Ref Range    Color Urine Yellow Colorless, Straw, Light Yellow, Yellow    Appearance Urine Slightly Cloudy (A) Clear    Glucose Urine Negative Negative mg/dL    Bilirubin Urine Negative Negative    Ketones Urine Negative Negative mg/dL    Specific Gravity Urine 1.010 1.005 - 1.030    Blood Urine Negative Negative    pH Urine 6.5 5.0 - 8.0    Protein Albumin Urine Negative Negative  mg/dL    Urobilinogen Urine 0.2 0.2, 1.0 E.U./dL    Nitrite Urine Negative Negative    Leukocyte Esterase Urine Trace (A) Negative   UA Microscopic with Reflex to Culture     Status: Abnormal   Result Value Ref Range    Bacteria Urine Few (A) None Seen /HPF    RBC Urine None Seen 0-2 /HPF /HPF    WBC Urine 0-5 0-5 /HPF /HPF    Squamous Epithelials Urine Moderate (A) None Seen /LPF    Narrative    Urine Culture not indicated   CBC with platelets     Status: Abnormal   Result Value Ref Range    WBC Count 12.5 (H) 4.0 - 11.0 10e3/uL    RBC Count 4.72 3.80 - 5.20 10e6/uL    Hemoglobin 13.7 11.7 - 15.7 g/dL    Hematocrit 40.9 35.0 - 47.0 %    MCV 87 78 - 100 fL    MCH 29.0 26.5 - 33.0 pg    MCHC 33.5 31.5 - 36.5 g/dL    RDW 12.2 10.0 - 15.0 %    Platelet Count 252 150 - 450 10e3/uL   Comprehensive metabolic panel     Status: Normal   Result Value Ref Range    Sodium 138 136 - 145 mmol/L    Potassium 4.4 3.4 - 5.3 mmol/L    Chloride 101 98 - 107 mmol/L    Carbon Dioxide (CO2) 27 22 - 29 mmol/L    Anion Gap 10 7 - 15 mmol/L    Urea Nitrogen 11.6 6.0 - 20.0 mg/dL    Creatinine 0.71 0.51 - 0.95 mg/dL    Calcium 9.5 8.6 - 10.0 mg/dL    Glucose 95 70 - 99 mg/dL    Alkaline Phosphatase 92 35 - 104 U/L    AST 19 10 - 35 U/L    ALT 24 10 - 35 U/L    Protein Total 7.5 6.4 - 8.3 g/dL    Albumin 3.9 3.5 - 5.2 g/dL    Bilirubin Total 0.4 <=1.2 mg/dL    GFR Estimate >90 >60 mL/min/1.73m2   Lipase     Status: Normal   Result Value Ref Range    Lipase 27 13 - 60 U/L

## 2023-06-04 ENCOUNTER — HEALTH MAINTENANCE LETTER (OUTPATIENT)
Age: 48
End: 2023-06-04

## 2023-10-14 ENCOUNTER — HEALTH MAINTENANCE LETTER (OUTPATIENT)
Age: 48
End: 2023-10-14

## 2024-03-15 NOTE — PROGRESS NOTES
Assessment & Plan     Lumbar pain  Mild   - Physical Therapy  Referral; Future  Core is weak secondary to covid in December with large amount of weight lost while sick.    Leg weakness, bilateral  Ongoing   - Physical Therapy  Referral; Future  Would like to push for more physical activity that is guided. Patient agrees. To work on this with PT.   To also add protein shake or equivalent to a meal each day to help gain some weight back after covid.     Eyes swollen  Likely allergy   - CBC with platelets and differential; Future  - TSH with free T4 reflex; Future  - fexofenadine (ALLEGRA) 180 MG tablet; Take 1 tablet (180 mg) by mouth daily  On claritin, to try allegra daily     Pruritic disorder  To try allegra daily. No rash present. No pain. Appeared after covid. Has had shingles during this time and that has passed. Itching continues    Dry mouth  To try biotin daily as well as pushing extra fluids. Change in taste of water and food after covid. If pain presents or white substance consider fungal infection although not apparent today    Constipation  Miralax every other day until loose stools. Plenty of water.     BMI  Estimated body mass index is 26.76 kg/m  as calculated from the following:    Height as of this encounter: 1.524 m (5').    Weight as of this encounter: 62.1 kg (137 lb).   Plan to eat more daily with protein shakes     Depression Screening Follow Up        3/18/2024     6:53 AM   PHQ   PHQ-9 Total Score 12   Q9: Thoughts of better off dead/self-harm past 2 weeks Not at all           Follow Up Actions Taken  Crisis resource information provided in After Visit Summary  Patient declined referral.       Follow up if worsening or not improving     Subjective   Paulina is a 48 year old, presenting for the following health issues:  Covid Concern            3/18/2024     6:49 AM   Additional Questions   Roomed by Dwayne Rice MA   Accompanied by Self     Answers submitted by the patient for  this visit:  Patient Health Questionnaire (Submitted on 3/18/2024)  If you checked off any problems, how difficult have these problems made it for you to do your work, take care of things at home, or get along with other people?: Not difficult at all  PHQ9 TOTAL SCORE: 12      HPI       Review of Systems  Constitutional, neuro, ENT, endocrine, pulmonary, cardiac, gastrointestinal, genitourinary, musculoskeletal, integument and psychiatric systems are negative, except as otherwise noted.      Objective    /80   Pulse 101   Temp 97.5  F (36.4  C) (Tympanic)   Resp 14   Ht 1.524 m (5')   Wt 62.1 kg (137 lb)   SpO2 97%   Breastfeeding No   BMI 26.76 kg/m    Body mass index is 26.76 kg/m .    Physical Exam   GENERAL: alert and no distress  EYES: Eyes grossly normal to inspection, PERRL and conjunctivae and sclerae normal  HENT: ear canals and TM's normal, nose and mouth without ulcers or lesions  NECK: no adenopathy, no asymmetry, masses, or scars  RESP: lungs clear to auscultation - no rales, rhonchi or wheezes  CV: regular rate and rhythm, normal S1 S2, no S3 or S4, no murmur, click or rub, no peripheral edema  ABDOMEN: soft, nontender, no hepatosplenomegaly, no masses and bowel sounds normal  MS: no gross musculoskeletal defects noted, no edema  PSYCH: mentation appears normal, affect normal/bright            Signed Electronically by: Markus Dietz PA-C

## 2024-03-18 ENCOUNTER — OFFICE VISIT (OUTPATIENT)
Dept: FAMILY MEDICINE | Facility: CLINIC | Age: 49
End: 2024-03-18
Payer: COMMERCIAL

## 2024-03-18 VITALS
SYSTOLIC BLOOD PRESSURE: 127 MMHG | DIASTOLIC BLOOD PRESSURE: 80 MMHG | OXYGEN SATURATION: 97 % | RESPIRATION RATE: 14 BRPM | HEART RATE: 101 BPM | WEIGHT: 137 LBS | TEMPERATURE: 97.5 F | HEIGHT: 60 IN | BODY MASS INDEX: 26.9 KG/M2

## 2024-03-18 DIAGNOSIS — E05.90 HYPERTHYROIDISM: ICD-10-CM

## 2024-03-18 DIAGNOSIS — L29.9 PRURITIC DISORDER: ICD-10-CM

## 2024-03-18 DIAGNOSIS — R68.2 DRY MOUTH: ICD-10-CM

## 2024-03-18 DIAGNOSIS — H57.89 EYES SWOLLEN: ICD-10-CM

## 2024-03-18 DIAGNOSIS — R29.898 LEG WEAKNESS, BILATERAL: ICD-10-CM

## 2024-03-18 DIAGNOSIS — M54.50 LUMBAR PAIN: Primary | ICD-10-CM

## 2024-03-18 LAB
BASOPHILS # BLD AUTO: 0 10E3/UL (ref 0–0.2)
BASOPHILS NFR BLD AUTO: 0 %
EOSINOPHIL # BLD AUTO: 0.2 10E3/UL (ref 0–0.7)
EOSINOPHIL NFR BLD AUTO: 4 %
ERYTHROCYTE [DISTWIDTH] IN BLOOD BY AUTOMATED COUNT: 13.9 % (ref 10–15)
HCT VFR BLD AUTO: 39.1 % (ref 35–47)
HGB BLD-MCNC: 12.5 G/DL (ref 11.7–15.7)
IMM GRANULOCYTES # BLD: 0 10E3/UL
IMM GRANULOCYTES NFR BLD: 0 %
LYMPHOCYTES # BLD AUTO: 1.6 10E3/UL (ref 0.8–5.3)
LYMPHOCYTES NFR BLD AUTO: 26 %
MCH RBC QN AUTO: 26.4 PG (ref 26.5–33)
MCHC RBC AUTO-ENTMCNC: 32 G/DL (ref 31.5–36.5)
MCV RBC AUTO: 83 FL (ref 78–100)
MONOCYTES # BLD AUTO: 0.8 10E3/UL (ref 0–1.3)
MONOCYTES NFR BLD AUTO: 13 %
NEUTROPHILS # BLD AUTO: 3.4 10E3/UL (ref 1.6–8.3)
NEUTROPHILS NFR BLD AUTO: 57 %
PLATELET # BLD AUTO: 106 10E3/UL (ref 150–450)
RBC # BLD AUTO: 4.73 10E6/UL (ref 3.8–5.2)
T4 FREE SERPL-MCNC: >7.77 NG/DL (ref 0.9–1.7)
TSH SERPL DL<=0.005 MIU/L-ACNC: <0.01 UIU/ML (ref 0.3–4.2)
WBC # BLD AUTO: 6 10E3/UL (ref 4–11)

## 2024-03-18 PROCEDURE — 99214 OFFICE O/P EST MOD 30 MIN: CPT | Performed by: PHYSICIAN ASSISTANT

## 2024-03-18 PROCEDURE — 85025 COMPLETE CBC W/AUTO DIFF WBC: CPT | Performed by: PHYSICIAN ASSISTANT

## 2024-03-18 PROCEDURE — 36415 COLL VENOUS BLD VENIPUNCTURE: CPT | Performed by: PHYSICIAN ASSISTANT

## 2024-03-18 PROCEDURE — 84443 ASSAY THYROID STIM HORMONE: CPT | Performed by: PHYSICIAN ASSISTANT

## 2024-03-18 PROCEDURE — 84439 ASSAY OF FREE THYROXINE: CPT | Performed by: PHYSICIAN ASSISTANT

## 2024-03-18 RX ORDER — FEXOFENADINE HCL 180 MG/1
180 TABLET ORAL DAILY
Qty: 90 TABLET | Refills: 1 | Status: SHIPPED | OUTPATIENT
Start: 2024-03-18 | End: 2024-07-03

## 2024-03-18 ASSESSMENT — ANXIETY QUESTIONNAIRES
IF YOU CHECKED OFF ANY PROBLEMS ON THIS QUESTIONNAIRE, HOW DIFFICULT HAVE THESE PROBLEMS MADE IT FOR YOU TO DO YOUR WORK, TAKE CARE OF THINGS AT HOME, OR GET ALONG WITH OTHER PEOPLE: NOT DIFFICULT AT ALL
1. FEELING NERVOUS, ANXIOUS, OR ON EDGE: NEARLY EVERY DAY
4. TROUBLE RELAXING: SEVERAL DAYS
8. IF YOU CHECKED OFF ANY PROBLEMS, HOW DIFFICULT HAVE THESE MADE IT FOR YOU TO DO YOUR WORK, TAKE CARE OF THINGS AT HOME, OR GET ALONG WITH OTHER PEOPLE?: NOT DIFFICULT AT ALL
3. WORRYING TOO MUCH ABOUT DIFFERENT THINGS: SEVERAL DAYS
7. FEELING AFRAID AS IF SOMETHING AWFUL MIGHT HAPPEN: NOT AT ALL
GAD7 TOTAL SCORE: 9
5. BEING SO RESTLESS THAT IT IS HARD TO SIT STILL: NOT AT ALL
GAD7 TOTAL SCORE: 9
2. NOT BEING ABLE TO STOP OR CONTROL WORRYING: NEARLY EVERY DAY
GAD7 TOTAL SCORE: 9
6. BECOMING EASILY ANNOYED OR IRRITABLE: SEVERAL DAYS
7. FEELING AFRAID AS IF SOMETHING AWFUL MIGHT HAPPEN: NOT AT ALL

## 2024-03-18 ASSESSMENT — PAIN SCALES - GENERAL: PAINLEVEL: NO PAIN (0)

## 2024-03-18 ASSESSMENT — PATIENT HEALTH QUESTIONNAIRE - PHQ9
SUM OF ALL RESPONSES TO PHQ QUESTIONS 1-9: 12
10. IF YOU CHECKED OFF ANY PROBLEMS, HOW DIFFICULT HAVE THESE PROBLEMS MADE IT FOR YOU TO DO YOUR WORK, TAKE CARE OF THINGS AT HOME, OR GET ALONG WITH OTHER PEOPLE: NOT DIFFICULT AT ALL
SUM OF ALL RESPONSES TO PHQ QUESTIONS 1-9: 12

## 2024-03-21 ENCOUNTER — TELEPHONE (OUTPATIENT)
Dept: ENDOCRINOLOGY | Facility: CLINIC | Age: 49
End: 2024-03-21
Payer: COMMERCIAL

## 2024-03-21 NOTE — TELEPHONE ENCOUNTER
Patient Contacted for the patient to call back and schedule the following:    Appointment type: New Endocrine   Provider: Ashley   Return date: 4/30  Specialty phone number: [phone]  Additional appointment(s) needed: NA   Additonal Notes: Spoke to pt and gave sooner options. Pt needed an appt at 8am/earlier than that or 5pm/later than that so  could be present for visit. Added pt to wait-list with multiple providers.    Triage note:   Dx: Eyes swollen; E05.90 (ICD-10-CM) - Hyperthyroidism  Abnormal Thyroid   Endo Team: None  LABS: MAR 18 2024  TSH: 0.01  T4F: 7.7  Current visit DEC 18 2024 with Shiela Izquierdo.   CCs notified to schedule Urgent On Call or AUDREY within 1 week per protocol.   Soo Kirk, RN on 3/21/24 at 9:58 AM     Marivel Zuñiga on 3/21/2024 at 10:35 AM

## 2024-03-25 ENCOUNTER — ANCILLARY PROCEDURE (OUTPATIENT)
Dept: ULTRASOUND IMAGING | Facility: CLINIC | Age: 49
End: 2024-03-25
Attending: PHYSICIAN ASSISTANT
Payer: COMMERCIAL

## 2024-03-25 DIAGNOSIS — E05.90 HYPERTHYROIDISM: ICD-10-CM

## 2024-03-25 PROCEDURE — 76536 US EXAM OF HEAD AND NECK: CPT | Mod: TC | Performed by: RADIOLOGY

## 2024-04-02 ENCOUNTER — APPOINTMENT (OUTPATIENT)
Dept: ULTRASOUND IMAGING | Facility: HOSPITAL | Age: 49
End: 2024-04-02
Attending: EMERGENCY MEDICINE
Payer: COMMERCIAL

## 2024-04-02 ENCOUNTER — APPOINTMENT (OUTPATIENT)
Dept: CT IMAGING | Facility: HOSPITAL | Age: 49
End: 2024-04-02
Attending: EMERGENCY MEDICINE
Payer: COMMERCIAL

## 2024-04-02 ENCOUNTER — HOSPITAL ENCOUNTER (EMERGENCY)
Facility: HOSPITAL | Age: 49
Discharge: HOME | End: 2024-04-02
Attending: EMERGENCY MEDICINE | Admitting: EMERGENCY MEDICINE
Payer: COMMERCIAL

## 2024-04-02 VITALS
SYSTOLIC BLOOD PRESSURE: 123 MMHG | RESPIRATION RATE: 25 BRPM | BODY MASS INDEX: 25.11 KG/M2 | HEIGHT: 61 IN | HEART RATE: 110 BPM | DIASTOLIC BLOOD PRESSURE: 67 MMHG | WEIGHT: 133 LBS | TEMPERATURE: 98.3 F | OXYGEN SATURATION: 97 %

## 2024-04-02 DIAGNOSIS — E87.6 HYPOKALEMIA: ICD-10-CM

## 2024-04-02 DIAGNOSIS — N23 RENAL COLIC ON RIGHT SIDE: ICD-10-CM

## 2024-04-02 LAB
ALBUMIN SERPL BCG-MCNC: 3.8 G/DL (ref 3.5–5.2)
ALBUMIN UR-MCNC: NEGATIVE MG/DL
ALP SERPL-CCNC: 145 U/L (ref 40–150)
ALT SERPL W P-5'-P-CCNC: 34 U/L (ref 0–50)
ANION GAP SERPL CALCULATED.3IONS-SCNC: 17 MMOL/L (ref 7–15)
APPEARANCE UR: CLEAR
AST SERPL W P-5'-P-CCNC: 36 U/L (ref 0–45)
BILIRUB SERPL-MCNC: 0.9 MG/DL
BILIRUB UR QL STRIP: NEGATIVE
BUN SERPL-MCNC: 11.9 MG/DL (ref 6–20)
CALCIUM SERPL-MCNC: 10.8 MG/DL (ref 8.6–10)
CHLORIDE SERPL-SCNC: 104 MMOL/L (ref 98–107)
COLOR UR AUTO: COLORLESS
CREAT SERPL-MCNC: 0.37 MG/DL (ref 0.51–0.95)
DEPRECATED HCO3 PLAS-SCNC: 20 MMOL/L (ref 22–29)
EGFRCR SERPLBLD CKD-EPI 2021: >90 ML/MIN/1.73M2
ERYTHROCYTE [DISTWIDTH] IN BLOOD BY AUTOMATED COUNT: 14.3 % (ref 10–15)
GLUCOSE SERPL-MCNC: 113 MG/DL (ref 70–99)
GLUCOSE UR STRIP-MCNC: NEGATIVE MG/DL
HCG SERPL QL: NEGATIVE
HCT VFR BLD AUTO: 38.6 % (ref 35–47)
HGB BLD-MCNC: 12.6 G/DL (ref 11.7–15.7)
HGB UR QL STRIP: ABNORMAL
KETONES UR STRIP-MCNC: NEGATIVE MG/DL
LEUKOCYTE ESTERASE UR QL STRIP: NEGATIVE
MCH RBC QN AUTO: 26.4 PG (ref 26.5–33)
MCHC RBC AUTO-ENTMCNC: 32.6 G/DL (ref 31.5–36.5)
MCV RBC AUTO: 81 FL (ref 78–100)
NITRATE UR QL: NEGATIVE
PH UR STRIP: 5.5 [PH] (ref 5–7)
PLATELET # BLD AUTO: 142 10E3/UL (ref 150–450)
POTASSIUM SERPL-SCNC: 3.2 MMOL/L (ref 3.4–5.3)
PROT SERPL-MCNC: 7.1 G/DL (ref 6.4–8.3)
RBC # BLD AUTO: 4.78 10E6/UL (ref 3.8–5.2)
RBC URINE: 2 /HPF
SODIUM SERPL-SCNC: 141 MMOL/L (ref 135–145)
SP GR UR STRIP: 1 (ref 1–1.03)
SQUAMOUS EPITHELIAL: <1 /HPF
UROBILINOGEN UR STRIP-MCNC: <2 MG/DL
WBC # BLD AUTO: 9.4 10E3/UL (ref 4–11)
WBC URINE: <1 /HPF

## 2024-04-02 PROCEDURE — 81001 URINALYSIS AUTO W/SCOPE: CPT | Performed by: EMERGENCY MEDICINE

## 2024-04-02 PROCEDURE — 85027 COMPLETE CBC AUTOMATED: CPT | Performed by: EMERGENCY MEDICINE

## 2024-04-02 PROCEDURE — 250N000011 HC RX IP 250 OP 636: Performed by: EMERGENCY MEDICINE

## 2024-04-02 PROCEDURE — 36415 COLL VENOUS BLD VENIPUNCTURE: CPT | Performed by: EMERGENCY MEDICINE

## 2024-04-02 PROCEDURE — 96375 TX/PRO/DX INJ NEW DRUG ADDON: CPT

## 2024-04-02 PROCEDURE — 96361 HYDRATE IV INFUSION ADD-ON: CPT

## 2024-04-02 PROCEDURE — 96374 THER/PROPH/DIAG INJ IV PUSH: CPT | Mod: 59

## 2024-04-02 PROCEDURE — 258N000003 HC RX IP 258 OP 636: Performed by: EMERGENCY MEDICINE

## 2024-04-02 PROCEDURE — 74177 CT ABD & PELVIS W/CONTRAST: CPT

## 2024-04-02 PROCEDURE — 99285 EMERGENCY DEPT VISIT HI MDM: CPT | Mod: 25

## 2024-04-02 PROCEDURE — 80053 COMPREHEN METABOLIC PANEL: CPT | Performed by: EMERGENCY MEDICINE

## 2024-04-02 PROCEDURE — 76830 TRANSVAGINAL US NON-OB: CPT

## 2024-04-02 PROCEDURE — 250N000013 HC RX MED GY IP 250 OP 250 PS 637: Performed by: EMERGENCY MEDICINE

## 2024-04-02 PROCEDURE — 84703 CHORIONIC GONADOTROPIN ASSAY: CPT | Performed by: EMERGENCY MEDICINE

## 2024-04-02 RX ORDER — MORPHINE SULFATE 4 MG/ML
4 INJECTION, SOLUTION INTRAMUSCULAR; INTRAVENOUS ONCE
Status: DISCONTINUED | OUTPATIENT
Start: 2024-04-02 | End: 2024-04-02

## 2024-04-02 RX ORDER — IBUPROFEN 600 MG/1
600 TABLET, FILM COATED ORAL EVERY 8 HOURS PRN
Qty: 30 TABLET | Refills: 0 | Status: SHIPPED | OUTPATIENT
Start: 2024-04-02

## 2024-04-02 RX ORDER — IOPAMIDOL 755 MG/ML
71 INJECTION, SOLUTION INTRAVASCULAR ONCE
Status: COMPLETED | OUTPATIENT
Start: 2024-04-02 | End: 2024-04-02

## 2024-04-02 RX ORDER — MORPHINE SULFATE 4 MG/ML
4 INJECTION, SOLUTION INTRAMUSCULAR; INTRAVENOUS ONCE
Status: COMPLETED | OUTPATIENT
Start: 2024-04-02 | End: 2024-04-02

## 2024-04-02 RX ORDER — SODIUM CHLORIDE 9 MG/ML
INJECTION, SOLUTION INTRAVENOUS CONTINUOUS
Status: DISCONTINUED | OUTPATIENT
Start: 2024-04-02 | End: 2024-04-02 | Stop reason: HOSPADM

## 2024-04-02 RX ORDER — KETOROLAC TROMETHAMINE 15 MG/ML
15 INJECTION, SOLUTION INTRAMUSCULAR; INTRAVENOUS ONCE
Status: COMPLETED | OUTPATIENT
Start: 2024-04-02 | End: 2024-04-02

## 2024-04-02 RX ORDER — ONDANSETRON 2 MG/ML
4 INJECTION INTRAMUSCULAR; INTRAVENOUS ONCE
Status: COMPLETED | OUTPATIENT
Start: 2024-04-02 | End: 2024-04-02

## 2024-04-02 RX ORDER — POTASSIUM CHLORIDE 1500 MG/1
40 TABLET, EXTENDED RELEASE ORAL ONCE
Status: COMPLETED | OUTPATIENT
Start: 2024-04-02 | End: 2024-04-02

## 2024-04-02 RX ORDER — HYDROCODONE BITARTRATE AND ACETAMINOPHEN 5; 325 MG/1; MG/1
1 TABLET ORAL EVERY 6 HOURS PRN
Qty: 10 TABLET | Refills: 0 | Status: SHIPPED | OUTPATIENT
Start: 2024-04-02 | End: 2024-04-05

## 2024-04-02 RX ADMIN — POTASSIUM CHLORIDE 40 MEQ: 1500 TABLET, EXTENDED RELEASE ORAL at 07:19

## 2024-04-02 RX ADMIN — IOPAMIDOL 71 ML: 755 INJECTION, SOLUTION INTRAVENOUS at 05:22

## 2024-04-02 RX ADMIN — SODIUM CHLORIDE: 9 INJECTION, SOLUTION INTRAVENOUS at 03:48

## 2024-04-02 RX ADMIN — MORPHINE SULFATE 4 MG: 4 INJECTION, SOLUTION INTRAMUSCULAR; INTRAVENOUS at 03:48

## 2024-04-02 RX ADMIN — ONDANSETRON 4 MG: 2 INJECTION INTRAMUSCULAR; INTRAVENOUS at 03:48

## 2024-04-02 RX ADMIN — KETOROLAC TROMETHAMINE 15 MG: 15 INJECTION, SOLUTION INTRAMUSCULAR; INTRAVENOUS at 05:40

## 2024-04-02 ASSESSMENT — COLUMBIA-SUICIDE SEVERITY RATING SCALE - C-SSRS
1. IN THE PAST MONTH, HAVE YOU WISHED YOU WERE DEAD OR WISHED YOU COULD GO TO SLEEP AND NOT WAKE UP?: NO
2. HAVE YOU ACTUALLY HAD ANY THOUGHTS OF KILLING YOURSELF IN THE PAST MONTH?: NO
6. HAVE YOU EVER DONE ANYTHING, STARTED TO DO ANYTHING, OR PREPARED TO DO ANYTHING TO END YOUR LIFE?: NO

## 2024-04-02 ASSESSMENT — ACTIVITIES OF DAILY LIVING (ADL)
ADLS_ACUITY_SCORE: 35

## 2024-04-02 NOTE — ED PROVIDER NOTES
EMERGENCY DEPARTMENT ENCOUNTER      NAME: Rd Hyman  AGE: 48 year old female  YOB: 1975  MRN: [id number]  EVALUATION DATE & TIME: 4/2/2024  3:30 AM    PCP: Kellie Torres    ED PROVIDER: Lalit Contreras M.D.      Chief Complaint   Patient presents with    Abdominal Pain         FINAL IMPRESSION:  Right ureteral lithiasis  Right ovarian cyst  Hypokalemia      ED COURSE & MEDICAL DECISION MAKING:    Pertinent Labs & Imaging studies reviewed. (See chart for details)  48 year old female presents to the Emergency Department for evaluation of the right lower quadrant abdominal pain.  Patient arrives by EMS after being unable to tolerate the pain while driving to the ER.  Patient states pain has been intermittent for the last month but much worse for last several hours.  Describes it as severe deep achiness.  Worsens with movement.  States her last menstrual cycle was in February.  Review of records indicate CT imaging on 5/8/2023 with 0.4 cm right sided kidney stone.  However given her delayed menses primary concern is ovarian cystic disease versus potential ectopic pregnancy.  Will obtain pelvic ultrasound, baseline blood work and pregnancy test.  If ultrasound is unremarkable we will proceed with CT imaging for potential renal colic.  Patient treated symptomatically with intravenous morphine and Zofran.  Patient made NPO in case this is a surgical emergency.  Given the chronicity pain less likely to represent infectious process such as appendicitis. Patient appears non toxic with stable vitals signs.     3:38 AM I met with the patient for the initial interview and physical examination. Discussed plan for treatment and workup in the ED. Provider wore a surgical mask  4:15 AM.  Electrolytes with minimal hypokalemia and minimal hypercalcemia.  CBC unremarkable.  Pregnancy test and urine analysis pending.  5:18 AM.  Pregnancy test negative.  Ultrasound with right ovarian cyst.  Incidental fibroids.  No  evidence of torsion  5:29 AM.  Patient requesting additional pain medications.  Toradol 15 mg ordered.  CT imaging reviewed.  Patient with marked hydroureter on the right with distally you UVJ stone measuring approximately 4 mm consistent with stone noted on previous CT.  Awaiting definitive report  6 AM.  Official CT report similar.  Awaiting urine analysis.  6:40 AM.  Urine analysis sent  7 on 9 AM.  Urine analysis without evidence of infection.  Will continue outpatient management.  Patient likely to pass kidney stone without difficulty.  At the conclusion of the encounter I discussed the results of all of the tests and the disposition. The questions were answered and return precautions provided. The patient or family acknowledged understanding and was agreeable with the care plan.       MEDICATIONS GIVEN IN THE EMERGENCY:  Medications - No data to display    NEW PRESCRIPTIONS STARTED AT TODAY'S ER VISIT  Current Discharge Medication List        START taking these medications    Details   HYDROcodone-acetaminophen (NORCO) 5-325 MG tablet Take 1 tablet by mouth every 6 hours as needed for pain  Qty: 10 tablet, Refills: 0      ibuprofen (ADVIL/MOTRIN) 600 MG tablet Take 1 tablet (600 mg) by mouth every 8 hours as needed for moderate pain  Qty: 30 tablet, Refills: 0                 =================================================================    HPI        Rd Hyman is a 48 year old female with a pertient medical history of kidney stone, abdominal pain who presents to the ED for evaluation of severe right lower quadrant pain.  Patient reports intermittent pain for the last month.  Much worse today.  Describes it as severe achiness localized to the right lower quadrant.  Reports nausea without vomiting.  Denies any urinary symptoms.  States her last menstrual cycle was early February.  Typically reports very normal menstrual cycles.      REVIEW OF SYSTEMS   Constitutional:  Denies fever, chills  Respiratory:   Denies productive cough or increased work of breathing  Cardiovascular:  Denies chest pain, palpitations  GI: Reports abdominal pain, with nausea, without vomiting, or change in bowel or bladder habits   Musculoskeletal:  Denies any new muscle/joint swelling  Skin:  Denies rash   Neurologic:  Denies focal weakness  All systems negative except as marked.     PAST MEDICAL HISTORY:  Past Medical History:   Diagnosis Date    Infertility        PAST SURGICAL HISTORY:  Past Surgical History:   Procedure Laterality Date    DILATION AND CURETTAGE  06/10/2014    DILATION AND CURETTAGE N/A 6/10/2014    Procedure: DILATION AND CURETTAGE;  Surgeon: Toni Shepard MD;  [place] [facility] [place]:          CURRENT MEDICATIONS:    No current facility-administered medications for this encounter.    Current Outpatient Medications:     Cholecalciferol (VITAMIN D-3) 25 MCG (1000 UT) CAPS, , Disp: , Rfl:     fexofenadine (ALLEGRA) 180 MG tablet, Take 1 tablet (180 mg) by mouth daily, Disp: 90 tablet, Rfl: 1    loratadine (CLARITIN) 10 MG tablet, Take 10 mg by mouth daily, Disp: , Rfl:     Misc Natural Products (FOCUSED MIND PO), , Disp: , Rfl:     multivitamin w/minerals (THERA-VIT-M) tablet, Take by mouth daily, Disp: , Rfl:     ALLERGIES:  Allergies   Allergen Reactions    Cat Hair Extract Other (See Comments)       FAMILY HISTORY:  History reviewed. No pertinent family history.    SOCIAL HISTORY:   Social History     Socioeconomic History    Marital status:      Spouse name: None    Number of children: None    Years of education: None    Highest education level: None   Tobacco Use    Smoking status: Never     Passive exposure: Current ( smokes outside)    Smokeless tobacco: Never   Vaping Use    Vaping Use: Never used   Substance and Sexual Activity    Alcohol use: Not Currently    Drug use: Never     Social Determinants of Health     Interpersonal Safety: Low Risk  (3/18/2024)    Interpersonal Safety      "Do you feel physically and emotionally safe where you currently live?: Yes     Within the past 12 months, have you been hit, slapped, kicked or otherwise physically hurt by someone?: No     Within the past 12 months, have you been humiliated or emotionally abused in other ways by your partner or ex-partner?: No       VITALS:  Patient Vitals for the past 24 hrs:   Height Weight   04/02/24 0337 1.549 m (5' 1\") 60.3 kg (133 lb)        PHYSICAL EXAM    Constitutional:  Awake, alert, in moderate apparent distress  HENT:  Normocephalic, Atraumatic. Bilateral external ears normal. Oropharynx moist. Nose normal. Neck- Normal range of motion with no guarding, Supple, No stridor.   Eyes:  PERRL, EOMI with no signs of entrapment, Conjunctiva normal, No discharge.   Respiratory:  Normal breath sounds, No respiratory distress, No wheezing.    Cardiovascular:  Normal heart rate, Normal rhythm, No appreciable rubs or gallops.   GI:  Soft, marked right lower quadrant tenderness with guarding, No distension, No palpable masses  Musculoskeletal:  Intact distal pulses, No edema. Good range of motion in all major joints. No tenderness to palpation or major deformities noted.  Integument:  Warm, Dry, No erythema, No rash.   Neurologic:  Alert & oriented, Normal motor function, Normal sensory function, No focal deficits noted.   Psychiatric:  Affect normal, Judgment normal, Mood normal.     LAB:  All pertinent labs reviewed and interpreted.       RADIOLOGY:  Reviewed all pertinent imaging. Please see official radiology report.  No orders to display             I, Mikki Soares, am serving as a scribe to document services personally performed by Lalit Contreras MD, based on my observation and the provider's statements to me. ILalit MD attest that Mikki Soares is acting in a scribe capacity, has observed my performance of the services and has documented them in accordance with my direction.    Lalit Contreras M.D.  Emergency " Medicine  [facility] EMERGENCY DEPARTMENT     Lalit Contreras MD  04/02/24 0724

## 2024-04-02 NOTE — ED NOTES
Bed: JNED-20  Expected date: 4/2/24  Expected time: 3:14 AM  Means of arrival: Ambulance  Comments:  Jessica 535- 46yof RLQ pain

## 2024-04-02 NOTE — ED TRIAGE NOTES
Pt brought in by [facility] from home due to RLQ pain since 6pm, pain score 10/10. Pt has been feeling on and off abdominal pain for 3 weeks now. Pt A/O x 4   Triage Assessment (Adult)       Row Name 04/02/24 0337          Triage Assessment    Airway WDL WDL        Respiratory WDL    Respiratory WDL WDL        Skin Circulation/Temperature WDL    Skin Circulation/Temperature WDL WDL        Cardiac WDL    Cardiac WDL X;rhythm        Peripheral/Neurovascular WDL    Peripheral Neurovascular WDL WDL        Cognitive/Neuro/Behavioral WDL    Cognitive/Neuro/Behavioral WDL WDL

## 2024-04-24 NOTE — CONFIDENTIAL NOTE
RECORDS RECEIVED FROM: internal    DATE RECEIVED: 4.30.24    NOTES (FOR ALL VISITS) STATUS DETAILS   OFFICE NOTES from referring provider internal  Markus Dietz PA-C    MEDICATION LIST internal     IMAGING      CT (HEAD/NECK/CHEST/ABDOMEN) internal  4.2.24, 5.8.23    ULTRASOUND (HEAD/NECK) internal  3.25.24   LABS     DIABETES: HBGA1C, CREATININE, FASTING LIPIDS, MICROALBUMIN URINE, POTASSIUM, TSH, T4    THYROID: TSH, T4, CBC, THYRODLONULIN, TOTAL T3, FREE T4, CALCITONIN, CEA internal  Cbc- 4.2.24  CMP- 4.2.24  TSH- 3.18.24  T4- 3.18.24

## 2024-04-29 NOTE — PROGRESS NOTES
Video-Visit Details    Type of service:  Video Visit  Video Start Time: 0757  Video End Time:0849  Originating Location (pt. Location): [place], [place]  Distant Location (provider location):  Home  Platform used for Video Visit: Marjorie Ramirez MD    Endocrinology Clinic Visit 4/29/2024    NAME:  Rd Hyman  PCP:  Kellie Torres  MRN:  [id number]  Reason for Consult:  Thyrotoxicosis  Requesting Provider:  Markus Dietz       HISTORY OF PRESENT ILLNESS  Rd Hyman is a 48 year old female with  who is here for initial evaluation and management of Thyrotoxicosis.    Patient initially seen by Dr. Zayas and then by me.    She was diagnosed with COVID19 in 12/2023. After this she started to develop weight loss, her weight at that time was 168 lbs and now she is weighing 129 lbs. She feels colder than usual. She has been experiencing tremors, palpitations associated with shortness of breath. She also has noticed weakness most notable when she is climbing stairs. She has not noticed neck swelling..     Her eyelids have been swollen and with eye pain. No blurry vision or diplopia.     Since onset symptoms have been persistent.     Last month she presented to ER for evaluation of flank pain. Found to have a kidney stone. Recently her serum calcium was 10.8. She has no prior history of hypercalcemia.     REVIEW OF SYSTEMS  10 point negative except as mentioned in HPI    Past Medical/Surgical History:  Past Medical History:   Diagnosis Date    Infertility      Past Surgical History:   Procedure Laterality Date    DILATION AND CURETTAGE  06/10/2014    DILATION AND CURETTAGE N/A 6/10/2014    Procedure: DILATION AND CURETTAGE;  Surgeon: Toni Shepard MD;  Location: [facility] OR;  Service:        Medications  Current Outpatient Medications   Medication Sig Dispense Refill    Cholecalciferol (VITAMIN D-3) 25 MCG (1000 UT) CAPS       fexofenadine (ALLEGRA) 180 MG tablet Take 1 tablet (180 mg) by mouth daily 90 tablet 1  "   ibuprofen (ADVIL/MOTRIN) 600 MG tablet Take 1 tablet (600 mg) by mouth every 8 hours as needed for moderate pain 30 tablet 0    loratadine (CLARITIN) 10 MG tablet Take 10 mg by mouth daily      Misc Natural Products (FOCUSED MIND PO)       multivitamin w/minerals (THERA-VIT-M) tablet Take by mouth daily       No current facility-administered medications for this visit.       Allergies  Allergies   Allergen Reactions    Cat Hair Extract Other (See Comments)         Family History  family history is not on file.    Social History  Social History     Tobacco Use    Smoking status: Never     Passive exposure: Current ( smokes outside)    Smokeless tobacco: Never   Substance Use Topics    Alcohol use: Not Currently       Physical Exam  Ht 1.549 m (5' 1\")   Wt 58.5 kg (129 lb)   LMP 02/05/2024   BMI 24.37 kg/m    Body mass index is 24.37 kg/m .  GENERAL :  In no apparent distress  EYES: No proptosis but there is obvious orbital swelling  RESP: Normal breathing  NEURO: awake, alert, responds appropriately to questions.      DATA REVIEW  Labs/Imaging  TSH   Date Value Ref Range Status   03/18/2024 <0.01 (L) 0.30 - 4.20 uIU/mL Final     Free T4   Date Value Ref Range Status   03/18/2024 >7.77 (H) 0.90 - 1.70 ng/dL Final     No results found for: \"TSI\"  No results found for: \"TSHRA\"  AST   Date Value Ref Range Status   04/02/2024 36 0 - 45 U/L Final     Comment:     Reference intervals for this test were updated on 6/12/2023 to more accurately reflect our healthy population. There may be differences in the flagging of prior results with similar values performed with this method. Interpretation of those prior results can be made in the context of the updated reference intervals.     ALT   Date Value Ref Range Status   04/02/2024 34 0 - 50 U/L Final     Comment:     Reference intervals for this test were updated on 6/12/2023 to more accurately reflect our healthy population. There may be differences in the flagging " of prior results with similar values performed with this method. Interpretation of those prior results can be made in the context of the updated reference intervals.       Alkaline Phosphatase   Date Value Ref Range Status   04/02/2024 145 40 - 150 U/L Final     Comment:     Reference intervals for this test were updated on 11/14/2023 to more accurately reflect our healthy population. There may be differences in the flagging of prior results with similar values performed with this method. Interpretation of those prior results can be made in the context of the updated reference intervals.     WBC Count   Date Value Ref Range Status   04/02/2024 9.4 4.0 - 11.0 10e3/uL Final      Latest Reference Range & Units 04/02/24 03:43   Sodium 135 - 145 mmol/L 141   Potassium 3.4 - 5.3 mmol/L 3.2 (L)   Chloride 98 - 107 mmol/L 104   Carbon Dioxide (CO2) 22 - 29 mmol/L 20 (L)   Urea Nitrogen 6.0 - 20.0 mg/dL 11.9   Creatinine 0.51 - 0.95 mg/dL 0.37 (L)   GFR Estimate >60 mL/min/1.73m2 >90   Calcium 8.6 - 10.0 mg/dL 10.8 (H)   Anion Gap 7 - 15 mmol/L 17 (H)   Albumin 3.5 - 5.2 g/dL 3.8   Protein Total 6.4 - 8.3 g/dL 7.1   Alkaline Phosphatase 40 - 150 U/L 145   ALT 0 - 50 U/L 34   AST 0 - 45 U/L 36   Bilirubin Total <=1.2 mg/dL 0.9   (L): Data is abnormally low  (H): Data is abnormally high            ASSESSMENT/PLAN:   # Thyrotoxicosis  She has had symptoms for 5 months now which include weight loss, weakness, palpitations, tremors. Given her orbital edema etiology of thyrotoxicosis is likely Graves' Disease.   Will plan to recheck TSH and free T4 along with TRAb and TSI. Anticipate initiation of methimazole, dose to be determined by free T4 level.   Also recommended to undergo evaluation with Ophthalmology. Pt will consider.  -- hold supplement contains biotin for 3 days, then get blood work  -- Will start Propranolol 10 mg TID PRN palpitations and tremors  -- cholestyramine 4 g TID  -- pending repeat TFTs and Thyroid  Autoantibodies, if confirm Graves' disease, will start MMI. Possible side effects reviewed    # Hypokalemia  She has had one instance of hypokalemia. 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   -- recheck    # Hypercalcemia  While hypercalcemia can be seen in hyperthyroidism due to increase bone turnover, given recent episode of nephrolithiasis she will need evaluation for Primary Hyperparathyroidism. Will recheck calcium along with albumin, PTH, Vitamin D and Phosphorus.      Carl Zayas MD  Endocrinology Fellow       Orders Placed This Encounter   Procedures    Thyroid stimulating immunoglobulin    Vitamin D Deficiency (D3 Only)    Parathyroid Hormone Intact    TSH    T4 free    Phosphorus    Basic metabolic panel    Albumin level    Thyrotropin Receptor Antibody    Adult Eye  Referral       You are seen for high thyroid hormone (thyrotoxicosis)  This can be caused by an autoimmune condition (Graves  disease), inflammation of the thyroid (thyroiditis), or due to thyroid nodules making too much thyroid hormone (hot nodule or toxic multinodular goiter).  The first step is to recheck labs and determine etiologies of thyrotoxicosis. We may need additional imaging.    In Graves  disease these antibodies (called the thyrotropin receptor antibodies (TRAb) or thyroid stimulating immunoglobulins (TSI) do the opposite - they cause the cells to work overtime. The antibodies in Graves  disease bind to receptors on the surface of thyroid cells and stimulate those cells to overproduce and release thyroid hormones. This results in an overactive thyroid (hyperthyroidism).    Treatments to help with symptoms and specific thyroid treatments.  Specific thyroid treatment includes medication (e. methimazole), radioactive iodide ablation, and/or surgery.    If methimazole is chosen, it can be continued for 12-18 months and then discontinued if TSH and TRAb levels are normal at that time. If TRAb  levels remain elevated, the chances of remission are much lower and prolonging treatment with antithyroid drugs is safe and may increase chances of remission. Long term treatment of hyperthyroidism with antithyroid drugs may be considered in selected cases.    Information for patients on Tapazole or Propylthiouracil (PTU)  The generic name for Tapazole is methimazole.      The drugs, Tapazole and PTU are used to treat an overactive thyroid (hyperthyroidism).      They prevent the thyroid from making too much thyroid hormone.  You can think of them as putting up a road block in your thyroid.    These drugs are usually well tolerated and safe, but rarely can cause serious side effects.  The two worst potential side-effects are:  agranulocytosis.  This is the loss of the white blood cells that fight infection.  This can occur in approximately 1 in 200 people.  liver problems.  This is even more rare than agranulocytosis but it can be very serious.    Because of the serious nature of the rare side-effects, you should notify your doctor if you develop the following symptoms while taking the drug:  Fever  sore throat  flu-like symptoms (nausea, vomiting, diarrhea, aches, abdominal pain)    In addition, anytime you have evidence of infection, you should get a blood test to be sure that you do not have agranulocytosis.  A prescription will be provided to you to get this blood test as needed.  This is an extra precaution and the results should be available the same day the blood test is drawn.  If your blood count is OK (which it almost always is), then you may continue to take the antithyroid drug.    While taking this medication, your doctor will probably want to see you frequently, every 1-2 months, at least for a time.  This is important so that the response can be monitored and the dose can be adjusted.      If you have concerns you may reach us at [phone] during regular hours, and [phone] (ask for  endocrinologist on call) after hours.      Orders Placed This Encounter   Procedures    Thyroid stimulating immunoglobulin    Vitamin D Deficiency (D3 Only)    Parathyroid Hormone Intact    TSH    T4 free    Phosphorus    Basic metabolic panel    Albumin level    Thyrotropin Receptor Antibody    Adult Eye  Referral       Document reviewed and updated by me.    Follow-up 6 weeks, if not available, ok for Friday May 17 virtual visit    For more information   [email or web address]      The longitudinal plan of care for the diagnosis(es)/condition(s) as documented were addressed during this visit. Due to the added complexity in care, I will continue to support Paulina in the subsequent management and with ongoing continuity of care.       Danilo Ramirez MD

## 2024-04-30 ENCOUNTER — TELEPHONE (OUTPATIENT)
Dept: OPHTHALMOLOGY | Facility: CLINIC | Age: 49
End: 2024-04-30

## 2024-04-30 ENCOUNTER — VIRTUAL VISIT (OUTPATIENT)
Dept: ENDOCRINOLOGY | Facility: CLINIC | Age: 49
End: 2024-04-30
Attending: PHYSICIAN ASSISTANT
Payer: COMMERCIAL

## 2024-04-30 ENCOUNTER — PRE VISIT (OUTPATIENT)
Dept: ENDOCRINOLOGY | Facility: CLINIC | Age: 49
End: 2024-04-30

## 2024-04-30 VITALS — HEIGHT: 61 IN | WEIGHT: 129 LBS | BODY MASS INDEX: 24.35 KG/M2

## 2024-04-30 DIAGNOSIS — E05.90 THYROTOXICOSIS WITHOUT THYROID STORM, UNSPECIFIED THYROTOXICOSIS TYPE: Primary | ICD-10-CM

## 2024-04-30 DIAGNOSIS — E83.52 HYPERCALCEMIA: ICD-10-CM

## 2024-04-30 DIAGNOSIS — H57.89 EYES SWOLLEN: ICD-10-CM

## 2024-04-30 DIAGNOSIS — E87.6 HYPOKALEMIA: ICD-10-CM

## 2024-04-30 PROCEDURE — G2211 COMPLEX E/M VISIT ADD ON: HCPCS | Mod: 95 | Performed by: INTERNAL MEDICINE

## 2024-04-30 PROCEDURE — 99204 OFFICE O/P NEW MOD 45 MIN: CPT | Mod: 95 | Performed by: INTERNAL MEDICINE

## 2024-04-30 RX ORDER — PROPRANOLOL HYDROCHLORIDE 20 MG/1
10 TABLET ORAL 3 TIMES DAILY PRN
Qty: 90 TABLET | Refills: 1 | Status: SHIPPED | OUTPATIENT
Start: 2024-04-30 | End: 2024-05-21

## 2024-04-30 RX ORDER — CHOLESTYRAMINE 4 G/9G
1 POWDER, FOR SUSPENSION ORAL
Qty: 90 PACKET | Refills: 1 | Status: SHIPPED | OUTPATIENT
Start: 2024-04-30 | End: 2024-05-21

## 2024-04-30 ASSESSMENT — PAIN SCALES - GENERAL: PAINLEVEL: NO PAIN (0)

## 2024-04-30 NOTE — NURSING NOTE
Is the patient currently in the state of [place]? YES    Visit mode:VIDEO    If the visit is dropped, the patient can be reconnected by: VIDEO VISIT: Send to e-mail at: [email or web address]    Will anyone else be joining the visit? NO  (If patient encounters technical issues they should call [phone]    How would you like to obtain your AVS? MyChart    Are changes needed to the allergy or medication list? No    Are refills needed on medications prescribed by this physician? NO    Reason for visit: Consult    Sandhya MIKE

## 2024-04-30 NOTE — TELEPHONE ENCOUNTER
Spoke to pt at 1425    Pt with puffiness/discharge since December.    Offered tomorrow in open new time slot at 0745 and pt unable    Scheduled May 22nd with Dr. Ballesteros in afternoon.    Pt aware of date/time/location'/duration/hospital based clinic/parking/non-[employer] insurance.      Axel Gates RN 2:31 PM 04/30/24

## 2024-04-30 NOTE — TELEPHONE ENCOUNTER
Health Call Center    Phone Message    May a detailed message be left on voicemail: yes     Reason for Call: Appointment Intake    Referring Provider Name: Danilo Ramirez MD  Diagnosis and/or Symptoms: Thyrotoxicosis without thyroid storm, unspecified thyrotoxicosis type      states patient had a virtual visit today for puffy eyes with a yellow discharge. He is not aware of the thyroid history.      Patient is at work, please call patient after 1:30.  Thank you.      Action Taken: Message routed to:  [facility] ([facility] Ophthalmology    Travel Screening: Not Applicable

## 2024-04-30 NOTE — LETTER
4/30/2024       RE: Rd Hyman  [address]  [facility] [address]     Dear Colleague,    Thank you for referring your patient, Rd Hyman, to the [facility] [place] at [facility]. Please see a copy of my visit note below.    Video-Visit Details    Type of service:  Video Visit  Video Start Time: 0757  Video End Time:0849  Originating Location (pt. Location): [place], [place]  Distant Location (provider location):  Home  Platform used for Video Visit: Marjorie Ramirez MD    Endocrinology Clinic Visit 4/29/2024    NAME:  Rd Hyman  PCP:  Kellie Torres  MRN:  [id number]  Reason for Consult:  Thyrotoxicosis  Requesting Provider:  Markus Dietz       HISTORY OF PRESENT ILLNESS  Rd Hyman is a 48 year old female with  who is here for initial evaluation and management of Thyrotoxicosis.    Patient initially seen by Dr. Zayas and then by me.    She was diagnosed with COVID19 in 12/2023. After this she started to develop weight loss, her weight at that time was 168 lbs and now she is weighing 129 lbs. She feels colder than usual. She has been experiencing tremors, palpitations associated with shortness of breath. She also has noticed weakness most notable when she is climbing stairs. She has not noticed neck swelling..     Her eyelids have been swollen and with eye pain. No blurry vision or diplopia.     Since onset symptoms have been persistent.     Last month she presented to ER for evaluation of flank pain. Found to have a kidney stone. Recently her serum calcium was 10.8. She has no prior history of hypercalcemia.     REVIEW OF SYSTEMS  10 point negative except as mentioned in HPI    Past Medical/Surgical History:  Past Medical History:   Diagnosis Date    Infertility      Past Surgical History:   Procedure Laterality Date    DILATION AND CURETTAGE  06/10/2014    DILATION AND CURETTAGE N/A 6/10/2014    Procedure: DILATION AND  "CURETTAGE;  Surgeon: Toni Shepard MD;  Location: [facility] OR;  Service:        Medications  Current Outpatient Medications   Medication Sig Dispense Refill    Cholecalciferol (VITAMIN D-3) 25 MCG (1000 UT) CAPS       fexofenadine (ALLEGRA) 180 MG tablet Take 1 tablet (180 mg) by mouth daily 90 tablet 1    ibuprofen (ADVIL/MOTRIN) 600 MG tablet Take 1 tablet (600 mg) by mouth every 8 hours as needed for moderate pain 30 tablet 0    loratadine (CLARITIN) 10 MG tablet Take 10 mg by mouth daily      Misc Natural Products (FOCUSED MIND PO)       multivitamin w/minerals (THERA-VIT-M) tablet Take by mouth daily       No current facility-administered medications for this visit.       Allergies  Allergies   Allergen Reactions    Cat Hair Extract Other (See Comments)         Family History  family history is not on file.    Social History  Social History     Tobacco Use    Smoking status: Never     Passive exposure: Current ( smokes outside)    Smokeless tobacco: Never   Substance Use Topics    Alcohol use: Not Currently       Physical Exam  Ht 1.549 m (5' 1\")   Wt 58.5 kg (129 lb)   LMP 02/05/2024   BMI 24.37 kg/m    Body mass index is 24.37 kg/m .  GENERAL :  In no apparent distress  EYES: No proptosis but there is obvious orbital swelling  RESP: Normal breathing  NEURO: awake, alert, responds appropriately to questions.      DATA REVIEW  Labs/Imaging  TSH   Date Value Ref Range Status   03/18/2024 <0.01 (L) 0.30 - 4.20 uIU/mL Final     Free T4   Date Value Ref Range Status   03/18/2024 >7.77 (H) 0.90 - 1.70 ng/dL Final     No results found for: \"TSI\"  No results found for: \"TSHRA\"  AST   Date Value Ref Range Status   04/02/2024 36 0 - 45 U/L Final     Comment:     Reference intervals for this test were updated on 6/12/2023 to more accurately reflect our healthy population. There may be differences in the flagging of prior results with similar values performed with this method. Interpretation of those " prior results can be made in the context of the updated reference intervals.     ALT   Date Value Ref Range Status   04/02/2024 34 0 - 50 U/L Final     Comment:     Reference intervals for this test were updated on 6/12/2023 to more accurately reflect our healthy population. There may be differences in the flagging of prior results with similar values performed with this method. Interpretation of those prior results can be made in the context of the updated reference intervals.       Alkaline Phosphatase   Date Value Ref Range Status   04/02/2024 145 40 - 150 U/L Final     Comment:     Reference intervals for this test were updated on 11/14/2023 to more accurately reflect our healthy population. There may be differences in the flagging of prior results with similar values performed with this method. Interpretation of those prior results can be made in the context of the updated reference intervals.     WBC Count   Date Value Ref Range Status   04/02/2024 9.4 4.0 - 11.0 10e3/uL Final      Latest Reference Range & Units 04/02/24 03:43   Sodium 135 - 145 mmol/L 141   Potassium 3.4 - 5.3 mmol/L 3.2 (L)   Chloride 98 - 107 mmol/L 104   Carbon Dioxide (CO2) 22 - 29 mmol/L 20 (L)   Urea Nitrogen 6.0 - 20.0 mg/dL 11.9   Creatinine 0.51 - 0.95 mg/dL 0.37 (L)   GFR Estimate >60 mL/min/1.73m2 >90   Calcium 8.6 - 10.0 mg/dL 10.8 (H)   Anion Gap 7 - 15 mmol/L 17 (H)   Albumin 3.5 - 5.2 g/dL 3.8   Protein Total 6.4 - 8.3 g/dL 7.1   Alkaline Phosphatase 40 - 150 U/L 145   ALT 0 - 50 U/L 34   AST 0 - 45 U/L 36   Bilirubin Total <=1.2 mg/dL 0.9   (L): Data is abnormally low  (H): Data is abnormally high            ASSESSMENT/PLAN:   # Thyrotoxicosis  She has had symptoms for 5 months now which include weight loss, weakness, palpitations, tremors. Given her orbital edema etiology of thyrotoxicosis is likely Graves' Disease.   Will plan to recheck TSH and free T4 along with TRAb and TSI. Anticipate initiation of methimazole, dose to  be determined by free T4 level.   Also recommended to undergo evaluation with Ophthalmology. Pt will consider.  -- hold supplement contains biotin for 3 days, then get blood work  -- Will start Propranolol 10 mg TID PRN palpitations and tremors  -- cholestyramine 4 g TID  -- pending repeat TFTs and Thyroid Autoantibodies, if confirm Graves' disease, will start MMI. Possible side effects reviewed    # Hypokalemia  She has had one instance of hypokalemia. 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   -- recheck    # Hypercalcemia  While hypercalcemia can be seen in hyperthyroidism due to increase bone turnover, given recent episode of nephrolithiasis she will need evaluation for Primary Hyperparathyroidism. Will recheck calcium along with albumin, PTH, Vitamin D and Phosphorus.      Carl Zayas MD  Endocrinology Fellow       Orders Placed This Encounter   Procedures    Thyroid stimulating immunoglobulin    Vitamin D Deficiency (D3 Only)    Parathyroid Hormone Intact    TSH    T4 free    Phosphorus    Basic metabolic panel    Albumin level    Thyrotropin Receptor Antibody    Adult Eye  Referral       You are seen for high thyroid hormone (thyrotoxicosis)  This can be caused by an autoimmune condition (Graves  disease), inflammation of the thyroid (thyroiditis), or due to thyroid nodules making too much thyroid hormone (hot nodule or toxic multinodular goiter).  The first step is to recheck labs and determine etiologies of thyrotoxicosis. We may need additional imaging.    In Graves  disease these antibodies (called the thyrotropin receptor antibodies (TRAb) or thyroid stimulating immunoglobulins (TSI) do the opposite - they cause the cells to work overtime. The antibodies in Graves  disease bind to receptors on the surface of thyroid cells and stimulate those cells to overproduce and release thyroid hormones. This results in an overactive thyroid (hyperthyroidism).    Treatments to  help with symptoms and specific thyroid treatments.  Specific thyroid treatment includes medication (e. methimazole), radioactive iodide ablation, and/or surgery.    If methimazole is chosen, it can be continued for 12-18 months and then discontinued if TSH and TRAb levels are normal at that time. If TRAb levels remain elevated, the chances of remission are much lower and prolonging treatment with antithyroid drugs is safe and may increase chances of remission. Long term treatment of hyperthyroidism with antithyroid drugs may be considered in selected cases.    Information for patients on Tapazole or Propylthiouracil (PTU)  The generic name for Tapazole is methimazole.      The drugs, Tapazole and PTU are used to treat an overactive thyroid (hyperthyroidism).      They prevent the thyroid from making too much thyroid hormone.  You can think of them as putting up a road block in your thyroid.    These drugs are usually well tolerated and safe, but rarely can cause serious side effects.  The two worst potential side-effects are:  agranulocytosis.  This is the loss of the white blood cells that fight infection.  This can occur in approximately 1 in 200 people.  liver problems.  This is even more rare than agranulocytosis but it can be very serious.    Because of the serious nature of the rare side-effects, you should notify your doctor if you develop the following symptoms while taking the drug:  Fever  sore throat  flu-like symptoms (nausea, vomiting, diarrhea, aches, abdominal pain)    In addition, anytime you have evidence of infection, you should get a blood test to be sure that you do not have agranulocytosis.  A prescription will be provided to you to get this blood test as needed.  This is an extra precaution and the results should be available the same day the blood test is drawn.  If your blood count is OK (which it almost always is), then you may continue to take the antithyroid drug.    While taking this  medication, your doctor will probably want to see you frequently, every 1-2 months, at least for a time.  This is important so that the response can be monitored and the dose can be adjusted.      If you have concerns you may reach us at [phone] during regular hours, and [phone] (ask for endocrinologist on call) after hours.      Orders Placed This Encounter   Procedures    Thyroid stimulating immunoglobulin    Vitamin D Deficiency (D3 Only)    Parathyroid Hormone Intact    TSH    T4 free    Phosphorus    Basic metabolic panel    Albumin level    Thyrotropin Receptor Antibody    Adult Eye [facility] Referral       Document reviewed and updated by me.    Follow-up 6 weeks, if not available, ok for Friday May 17 virtual visit    For more information   [email or web address]      The longitudinal plan of care for the diagnosis(es)/condition(s) as documented were addressed during this visit. Due to the added complexity in care, I will continue to support Paulina in the subsequent management and with ongoing continuity of care.       Danilo Ramirez MD

## 2024-05-16 ENCOUNTER — TELEPHONE (OUTPATIENT)
Dept: ENDOCRINOLOGY | Facility: CLINIC | Age: 49
End: 2024-05-16
Payer: COMMERCIAL

## 2024-05-16 NOTE — TELEPHONE ENCOUNTER
RE: Follow up Appt  Received: Today  Danilo Ramirez MD Miller, Deanne M, RN; P [id number]; P Endocrinology Support [facility],  There was one opening spot this Friday, but it was filled earlier this week.  Ok for overbook next Tuesday or Wednesday at 1.30 pm, ok for both in person or telehealth.  Pt is passed due for her labs.  Please have patient gets labs this week.  I will copy the [email or web address] team about this add on for next week.  Thank you!

## 2024-05-16 NOTE — TELEPHONE ENCOUNTER
Left Voicemail (1st Attempt) and Sent Mychart (1st Attempt) for the patient to call back and schedule the following:    Appointment type: return endocrine   Provider: Ashley   Return date: 5/21 or 5/22 at 1:30 pm   Specialty phone number: [phone]  Additional appointment(s) needed: labs this week (past due)   Additonal Notes: Ok for overbook next Tuesday or Wednesday at 1.30 pm, ok for both in person or telehealth.     An appt on 5/21 or 5/22 will need to be scheduled manually; ok per provider; ok to be in person or virtual per pt     Sera Merlos on 5/16/2024 at 10:24 AM

## 2024-05-17 NOTE — TELEPHONE ENCOUNTER
Patient Contacted for the patient to call back and schedule the following:    Appointment type: return endocrine   Provider: Ashley   Return date: 5/21 or 5/22 at 1:30 pm   Specialty phone number: [phone]  Additional appointment(s) needed: NA   Additonal Notes: Spoke to pt declined scheduling any appt due to work schedule, labs and possibly being sick. Sent message to the provider so they are aware.    Marivel Zuñiga on 5/17/2024 at 10:19 AM

## 2024-05-21 ENCOUNTER — LAB (OUTPATIENT)
Dept: LAB | Facility: CLINIC | Age: 49
End: 2024-05-21
Payer: COMMERCIAL

## 2024-05-21 ENCOUNTER — VIRTUAL VISIT (OUTPATIENT)
Dept: ENDOCRINOLOGY | Facility: CLINIC | Age: 49
End: 2024-05-21
Payer: COMMERCIAL

## 2024-05-21 DIAGNOSIS — E05.90 THYROTOXICOSIS WITHOUT THYROID STORM, UNSPECIFIED THYROTOXICOSIS TYPE: ICD-10-CM

## 2024-05-21 DIAGNOSIS — R21 RASH: ICD-10-CM

## 2024-05-21 DIAGNOSIS — Z11.4 SCREENING FOR HIV (HUMAN IMMUNODEFICIENCY VIRUS): Primary | ICD-10-CM

## 2024-05-21 DIAGNOSIS — E83.52 HYPERCALCEMIA: ICD-10-CM

## 2024-05-21 DIAGNOSIS — L29.9 ITCHING: Primary | ICD-10-CM

## 2024-05-21 PROCEDURE — 82306 VITAMIN D 25 HYDROXY: CPT

## 2024-05-21 PROCEDURE — 99000 SPECIMEN HANDLING OFFICE-LAB: CPT

## 2024-05-21 PROCEDURE — 83520 IMMUNOASSAY QUANT NOS NONAB: CPT | Mod: 90

## 2024-05-21 PROCEDURE — 87389 HIV-1 AG W/HIV-1&-2 AB AG IA: CPT

## 2024-05-21 PROCEDURE — G2211 COMPLEX E/M VISIT ADD ON: HCPCS | Mod: 95 | Performed by: INTERNAL MEDICINE

## 2024-05-21 PROCEDURE — 99214 OFFICE O/P EST MOD 30 MIN: CPT | Mod: 95 | Performed by: INTERNAL MEDICINE

## 2024-05-21 PROCEDURE — 83970 ASSAY OF PARATHORMONE: CPT

## 2024-05-21 PROCEDURE — 80069 RENAL FUNCTION PANEL: CPT

## 2024-05-21 PROCEDURE — 36415 COLL VENOUS BLD VENIPUNCTURE: CPT

## 2024-05-21 PROCEDURE — 84439 ASSAY OF FREE THYROXINE: CPT

## 2024-05-21 PROCEDURE — 80061 LIPID PANEL: CPT

## 2024-05-21 PROCEDURE — 84445 ASSAY OF TSI GLOBULIN: CPT | Mod: 90

## 2024-05-21 PROCEDURE — 84443 ASSAY THYROID STIM HORMONE: CPT

## 2024-05-21 NOTE — PROGRESS NOTES
Video-Visit Details    Type of service:  Video Visit  Video Start Time: 1330  Video End Time: 1352  Originating Location (pt. Location): [place], [place]  Distant Location (provider location):  Home  Platform used for Video Visit: Marjorie Ramirez MD    Endocrinology Clinic Visit 4/29/2024    NAME:  Rd Hyman  PCP:  Kellie Torres  MRN:  [id number]  Reason for Consult:  Thyrotoxicosis  Requesting Provider:  Markus Dietz       HISTORY OF PRESENT ILLNESS  Rd Hyman is a 48 year old female with  who is here for follow up.    Interval History  Stomach upset on cholestyramine  Stopped cholestyramine last night  Itches--> shingles--> rashes comes and goes  Swelling both legs  No shortness of breath     Pt also stopped taking propranolol since it makes palpitation worse  Pt believes all symptoms are from COVID, since it started after she had COVID    Initial visit  Patient initially seen by Dr. Zayas and then by me.    She was diagnosed with COVID19 in 12/2023. After this she started to develop weight loss, her weight at that time was 168 lbs and now she is weighing 129 lbs. She feels colder than usual. She has been experiencing tremors, palpitations associated with shortness of breath. She also has noticed weakness most notable when she is climbing stairs. She has not noticed neck swelling..     Her eyelids have been swollen and with eye pain. No blurry vision or diplopia.     Since onset symptoms have been persistent.     Last month she presented to ER for evaluation of flank pain. Found to have a kidney stone. Recently her serum calcium was 10.8. She has no prior history of hypercalcemia.     REVIEW OF SYSTEMS  10 point negative except as mentioned in HPI    Past Medical/Surgical History:  Past Medical History:   Diagnosis Date    Infertility      Past Surgical History:   Procedure Laterality Date    DILATION AND CURETTAGE  06/10/2014    DILATION AND CURETTAGE N/A 6/10/2014    Procedure: DILATION AND CURETTAGE;  " Surgeon: Toni Shepard MD;  Location: [facility] [place];  Service:        Medications  Current Outpatient Medications   Medication Sig Dispense Refill    Cholecalciferol (VITAMIN D-3) 25 MCG (1000 UT) CAPS       fexofenadine (ALLEGRA) 180 MG tablet Take 1 tablet (180 mg) by mouth daily 90 tablet 1    ibuprofen (ADVIL/MOTRIN) 600 MG tablet Take 1 tablet (600 mg) by mouth every 8 hours as needed for moderate pain 30 tablet 0    loratadine (CLARITIN) 10 MG tablet Take 10 mg by mouth daily      Misc Natural Products (FOCUSED MIND PO)       multivitamin w/minerals (THERA-VIT-M) tablet Take by mouth daily       No current facility-administered medications for this visit.       Allergies  Allergies   Allergen Reactions    Cat Hair Extract Other (See Comments)         Family History  family history is not on file.    Social History  Social History     Tobacco Use    Smoking status: Never     Passive exposure: Current ( smokes outside)    Smokeless tobacco: Never   Substance Use Topics    Alcohol use: Not Currently       Physical Exam  LMP 02/05/2024   There is no height or weight on file to calculate BMI.  GENERAL :  In no apparent distress  EYES: No proptosis, some periorbital swelling  RESP: Normal breathing  NEURO: awake, alert, responds appropriately to questions.      DATA REVIEW  Labs/Imaging  TSH   Date Value Ref Range Status   03/18/2024 <0.01 (L) 0.30 - 4.20 uIU/mL Final     Free T4   Date Value Ref Range Status   03/18/2024 >7.77 (H) 0.90 - 1.70 ng/dL Final     No results found for: \"TSI\"  No results found for: \"TSHRA\"  AST   Date Value Ref Range Status   04/02/2024 36 0 - 45 U/L Final     Comment:     Reference intervals for this test were updated on 6/12/2023 to more accurately reflect our healthy population. There may be differences in the flagging of prior results with similar values performed with this method. Interpretation of those prior results can be made in the context of the updated reference " intervals.     ALT   Date Value Ref Range Status   04/02/2024 34 0 - 50 U/L Final     Comment:     Reference intervals for this test were updated on 6/12/2023 to more accurately reflect our healthy population. There may be differences in the flagging of prior results with similar values performed with this method. Interpretation of those prior results can be made in the context of the updated reference intervals.       Alkaline Phosphatase   Date Value Ref Range Status   04/02/2024 145 40 - 150 U/L Final     Comment:     Reference intervals for this test were updated on 11/14/2023 to more accurately reflect our healthy population. There may be differences in the flagging of prior results with similar values performed with this method. Interpretation of those prior results can be made in the context of the updated reference intervals.     WBC Count   Date Value Ref Range Status   04/02/2024 9.4 4.0 - 11.0 10e3/uL Final      Latest Reference Range & Units 04/02/24 03:43   Sodium 135 - 145 mmol/L 141   Potassium 3.4 - 5.3 mmol/L 3.2 (L)   Chloride 98 - 107 mmol/L 104   Carbon Dioxide (CO2) 22 - 29 mmol/L 20 (L)   Urea Nitrogen 6.0 - 20.0 mg/dL 11.9   Creatinine 0.51 - 0.95 mg/dL 0.37 (L)   GFR Estimate >60 mL/min/1.73m2 >90   Calcium 8.6 - 10.0 mg/dL 10.8 (H)   Anion Gap 7 - 15 mmol/L 17 (H)   Albumin 3.5 - 5.2 g/dL 3.8   Protein Total 6.4 - 8.3 g/dL 7.1   Alkaline Phosphatase 40 - 150 U/L 145   ALT 0 - 50 U/L 34   AST 0 - 45 U/L 36   Bilirubin Total <=1.2 mg/dL 0.9   (L): Data is abnormally low  (H): Data is abnormally high            ASSESSMENT/PLAN:   ## Thyrotoxicosis, T4 >7.7  ## H/o COVID 19  ## Hypokalemia  ## Hypercalcemia  ## H/o kidney stone  ## Itchy skin and intermittent rash  Today we discussed that there're case reports on COVID-19 associated with thyroid diseases.  However, thyroid condition still needs specific thyroid treatment.  Pt can follow up with her primary about long COVID concern.  We discussed  in length regarding the severity of her elevated thyroid hormone.  Pt sometimes get out of breath with working and emotionally more irritable and easily get angry  Intermittent rashes, will refer to dermatology  -- labs pending, if Graves' positive, will start methimazole. MMI SE discussed  -- if Graves antibodies are neg, will do thyroid uptake and scan  -- pt declined propranolol/ alternative betablocker for now  -- pt to discuss about long COVID with primary  -- Intermittent rashes, will refer to dermatology  -- letter to be off work 5/25/24-5/31/24      Follow-up 1 month    Orders Placed This Encounter   Procedures    Adult Dermatology  Referral     Information for patients on Tapazole or Propylthiouracil (PTU)  The generic name for Tapazole is methimazole.      The drugs, Tapazole and PTU are used to treat an overactive thyroid (hyperthyroidism).      They prevent the thyroid from making too much thyroid hormone.  You can think of them as putting up a road block in your thyroid.    These drugs are usually well tolerated and safe, but rarely can cause serious side effects.  The two worst potential side-effects are:  agranulocytosis.  This is the loss of the white blood cells that fight infection.  This can occur in approximately 1 in 200 people.  liver problems.  This is even more rare than agranulocytosis but it can be very serious.    Because of the serious nature of the rare side-effects, you should notify your doctor if you develop the following symptoms while taking the drug:  Fever  sore throat  flu-like symptoms (nausea, vomiting, diarrhea, aches, abdominal pain)    In addition, anytime you have evidence of infection, you should get a blood test to be sure that you do not have agranulocytosis.  A prescription will be provided to you to get this blood test as needed.  This is an extra precaution and the results should be available the same day the blood test is drawn.  If your blood count is OK  (which it almost always is), then you may continue to take the antithyroid drug.    While taking this medication, your doctor will probably want to see you frequently, every 1-2 months, at least for a time.  This is important so that the response can be monitored and the dose can be adjusted.      If you have concerns you may reach us at [phone] during regular hours, and [phone] (ask for endocrinologist on call) after hours.      Orders Placed This Encounter   Procedures    Adult Dermatology  Referral     The longitudinal plan of care for the diagnosis(es)/condition(s) as documented were addressed during this visit. Due to the added complexity in care, I will continue to support Paulina in the subsequent management and with ongoing continuity of care.

## 2024-05-21 NOTE — NURSING NOTE
Is the patient currently in the state of [place]? YES    Visit mode:VIDEO    If the visit is dropped, the patient can be reconnected by: VIDEO VISIT: Send to e-mail at: [email or web address]    Will anyone else be joining the visit?  may join  (If patient encounters technical issues they should call [phone])    How would you like to obtain your AVS? MyChart    Are changes needed to the allergy or medication list? No, Pt stated no changes to allergies, and Pt stated no med changes. Patient did not verbalize making any changes to e-check in information for visit. VF did not review e-check in information again with patient due to this.     Are refills needed on medications prescribed by this physician? NO    Reason for visit: RECHECK (Patient reported medication has made her stool turn white. She also experienced stomach pain and loose stool. Patient is still feeling tired. )    Caryl MIKE

## 2024-05-21 NOTE — LETTER
5/21/2024         RE: Rd Hyman  [address]  [facility] [address]        Dear Colleague,    Thank you for referring your patient, Rd Hyman, to the [facility] [place]. Please see a copy of my visit note below.    Video-Visit Details    Type of service:  Video Visit  Video Start Time: 1330  Video End Time: 1352  Originating Location (pt. Location): [place], [place]  Distant Location (provider location):  Home  Platform used for Video Visit: Marjorie Ramirez MD    Endocrinology Clinic Visit 4/29/2024    NAME:  Rd Hyman  PCP:  Kellie Torres  MRN:  [id number]  Reason for Consult:  Thyrotoxicosis  Requesting Provider:  Markus Dietz       HISTORY OF PRESENT ILLNESS  Rd Hyman is a 48 year old female with  who is here for follow up.    Interval History  Stomach upset on cholestyramine  Stopped cholestyramine last night  Itches--> shingles--> rashes comes and goes  Swelling both legs  No shortness of breath     Pt also stopped taking propranolol since it makes palpitation worse  Pt believes all symptoms are from COVID, since it started after she had COVID    Initial visit  Patient initially seen by Dr. Zayas and then by me.    She was diagnosed with COVID19 in 12/2023. After this she started to develop weight loss, her weight at that time was 168 lbs and now she is weighing 129 lbs. She feels colder than usual. She has been experiencing tremors, palpitations associated with shortness of breath. She also has noticed weakness most notable when she is climbing stairs. She has not noticed neck swelling..     Her eyelids have been swollen and with eye pain. No blurry vision or diplopia.     Since onset symptoms have been persistent.     Last month she presented to ER for evaluation of flank pain. Found to have a kidney stone. Recently her serum calcium was 10.8. She has no prior history of hypercalcemia.     REVIEW OF SYSTEMS  10 point negative except as mentioned in  "HPI    Past Medical/Surgical History:  Past Medical History:   Diagnosis Date     Infertility      Past Surgical History:   Procedure Laterality Date     DILATION AND CURETTAGE  06/10/2014     DILATION AND CURETTAGE N/A 6/10/2014    Procedure: DILATION AND CURETTAGE;  Surgeon: Toni Shepard MD;  Location: [facility] [place];  Service:        Medications  Current Outpatient Medications   Medication Sig Dispense Refill     Cholecalciferol (VITAMIN D-3) 25 MCG (1000 UT) CAPS        fexofenadine (ALLEGRA) 180 MG tablet Take 1 tablet (180 mg) by mouth daily 90 tablet 1     ibuprofen (ADVIL/MOTRIN) 600 MG tablet Take 1 tablet (600 mg) by mouth every 8 hours as needed for moderate pain 30 tablet 0     loratadine (CLARITIN) 10 MG tablet Take 10 mg by mouth daily       Misc Natural Products (FOCUSED MIND PO)        multivitamin w/minerals (THERA-VIT-M) tablet Take by mouth daily       No current facility-administered medications for this visit.       Allergies  Allergies   Allergen Reactions     Cat Hair Extract Other (See Comments)         Family History  family history is not on file.    Social History  Social History     Tobacco Use     Smoking status: Never     Passive exposure: Current ( smokes outside)     Smokeless tobacco: Never   Substance Use Topics     Alcohol use: Not Currently       Physical Exam  LMP 02/05/2024   There is no height or weight on file to calculate BMI.  GENERAL :  In no apparent distress  EYES: No proptosis, some periorbital swelling  RESP: Normal breathing  NEURO: awake, alert, responds appropriately to questions.      DATA REVIEW  Labs/Imaging  TSH   Date Value Ref Range Status   03/18/2024 <0.01 (L) 0.30 - 4.20 uIU/mL Final     Free T4   Date Value Ref Range Status   03/18/2024 >7.77 (H) 0.90 - 1.70 ng/dL Final     No results found for: \"TSI\"  No results found for: \"TSHRA\"  AST   Date Value Ref Range Status   04/02/2024 36 0 - 45 U/L Final     Comment:     Reference intervals for this " test were updated on 6/12/2023 to more accurately reflect our healthy population. There may be differences in the flagging of prior results with similar values performed with this method. Interpretation of those prior results can be made in the context of the updated reference intervals.     ALT   Date Value Ref Range Status   04/02/2024 34 0 - 50 U/L Final     Comment:     Reference intervals for this test were updated on 6/12/2023 to more accurately reflect our healthy population. There may be differences in the flagging of prior results with similar values performed with this method. Interpretation of those prior results can be made in the context of the updated reference intervals.       Alkaline Phosphatase   Date Value Ref Range Status   04/02/2024 145 40 - 150 U/L Final     Comment:     Reference intervals for this test were updated on 11/14/2023 to more accurately reflect our healthy population. There may be differences in the flagging of prior results with similar values performed with this method. Interpretation of those prior results can be made in the context of the updated reference intervals.     WBC Count   Date Value Ref Range Status   04/02/2024 9.4 4.0 - 11.0 10e3/uL Final      Latest Reference Range & Units 04/02/24 03:43   Sodium 135 - 145 mmol/L 141   Potassium 3.4 - 5.3 mmol/L 3.2 (L)   Chloride 98 - 107 mmol/L 104   Carbon Dioxide (CO2) 22 - 29 mmol/L 20 (L)   Urea Nitrogen 6.0 - 20.0 mg/dL 11.9   Creatinine 0.51 - 0.95 mg/dL 0.37 (L)   GFR Estimate >60 mL/min/1.73m2 >90   Calcium 8.6 - 10.0 mg/dL 10.8 (H)   Anion Gap 7 - 15 mmol/L 17 (H)   Albumin 3.5 - 5.2 g/dL 3.8   Protein Total 6.4 - 8.3 g/dL 7.1   Alkaline Phosphatase 40 - 150 U/L 145   ALT 0 - 50 U/L 34   AST 0 - 45 U/L 36   Bilirubin Total <=1.2 mg/dL 0.9   (L): Data is abnormally low  (H): Data is abnormally high            ASSESSMENT/PLAN:   ## Thyrotoxicosis, T4 >7.7  ## H/o COVID 19  ## Hypokalemia  ## Hypercalcemia  ## H/o kidney  stone  ## Itchy skin and intermittent rash  Today we discussed that there're case reports on COVID-19 associated with thyroid diseases.  However, thyroid condition still needs specific thyroid treatment.  Pt can follow up with her primary about long COVID concern.  We discussed in length regarding the severity of her elevated thyroid hormone.  Pt sometimes get out of breath with working and emotionally more irritable and easily get angry  Intermittent rashes, will refer to dermatology  -- labs pending, if Graves' positive, will start methimazole. MMI SE discussed  -- if Graves antibodies are neg, will do thyroid uptake and scan  -- pt declined propranolol/ alternative betablocker for now  -- pt to discuss about long COVID with primary  -- Intermittent rashes, will refer to dermatology  -- letter to be off work 5/25/24-5/31/24      Follow-up 1 month    Orders Placed This Encounter   Procedures     Adult Dermatology  Referral     Information for patients on Tapazole or Propylthiouracil (PTU)  The generic name for Tapazole is methimazole.      The drugs, Tapazole and PTU are used to treat an overactive thyroid (hyperthyroidism).      They prevent the thyroid from making too much thyroid hormone.  You can think of them as putting up a road block in your thyroid.    These drugs are usually well tolerated and safe, but rarely can cause serious side effects.  The two worst potential side-effects are:  agranulocytosis.  This is the loss of the white blood cells that fight infection.  This can occur in approximately 1 in 200 people.  liver problems.  This is even more rare than agranulocytosis but it can be very serious.    Because of the serious nature of the rare side-effects, you should notify your doctor if you develop the following symptoms while taking the drug:  Fever  sore throat  flu-like symptoms (nausea, vomiting, diarrhea, aches, abdominal pain)    In addition, anytime you have evidence of infection, you  should get a blood test to be sure that you do not have agranulocytosis.  A prescription will be provided to you to get this blood test as needed.  This is an extra precaution and the results should be available the same day the blood test is drawn.  If your blood count is OK (which it almost always is), then you may continue to take the antithyroid drug.    While taking this medication, your doctor will probably want to see you frequently, every 1-2 months, at least for a time.  This is important so that the response can be monitored and the dose can be adjusted.      If you have concerns you may reach us at [phone] during regular hours, and [phone] (ask for endocrinologist on call) after hours.      Orders Placed This Encounter   Procedures     Adult Dermatology  Referral     The longitudinal plan of care for the diagnosis(es)/condition(s) as documented were addressed during this visit. Due to the added complexity in care, I will continue to support Paulina in the subsequent management and with ongoing continuity of care.        Again, thank you for allowing me to participate in the care of your patient.        Sincerely,        Danilo Ramirez MD

## 2024-05-21 NOTE — LETTER
May 21, 2024      Rd Hyman  [address] [facility] [address]        To Whom It May Concern:    Rd Hyman  was seen on 5/21/24.  Please excuse her from work  5/25/24-5/31/24 due to illness.        Sincerely,        Danilo Ramirez MD

## 2024-05-22 LAB
ALBUMIN SERPL BCG-MCNC: 3.7 G/DL (ref 3.5–5.2)
ANION GAP SERPL CALCULATED.3IONS-SCNC: 12 MMOL/L (ref 7–15)
BUN SERPL-MCNC: 9.2 MG/DL (ref 6–20)
CALCIUM SERPL-MCNC: 10.3 MG/DL (ref 8.6–10)
CHLORIDE SERPL-SCNC: 107 MMOL/L (ref 98–107)
CHOLEST SERPL-MCNC: 97 MG/DL
CREAT SERPL-MCNC: 0.31 MG/DL (ref 0.51–0.95)
DEPRECATED HCO3 PLAS-SCNC: 22 MMOL/L (ref 22–29)
EGFRCR SERPLBLD CKD-EPI 2021: >90 ML/MIN/1.73M2
FASTING STATUS PATIENT QL REPORTED: ABNORMAL
FASTING STATUS PATIENT QL REPORTED: ABNORMAL
GLUCOSE SERPL-MCNC: 87 MG/DL (ref 70–99)
HDLC SERPL-MCNC: 48 MG/DL
HIV 1+2 AB+HIV1 P24 AG SERPL QL IA: NONREACTIVE
LDLC SERPL CALC-MCNC: 31 MG/DL
NONHDLC SERPL-MCNC: 49 MG/DL
PHOSPHATE SERPL-MCNC: 4.1 MG/DL (ref 2.5–4.5)
POTASSIUM SERPL-SCNC: 3.6 MMOL/L (ref 3.4–5.3)
PTH-INTACT SERPL-MCNC: 7 PG/ML (ref 15–65)
SODIUM SERPL-SCNC: 141 MMOL/L (ref 135–145)
T4 FREE SERPL-MCNC: >7.77 NG/DL (ref 0.9–1.7)
TRIGL SERPL-MCNC: 91 MG/DL
TSH RECEP AB SER-ACNC: 15 IU/L (ref 0–1.75)
TSH SERPL DL<=0.005 MIU/L-ACNC: <0.01 UIU/ML (ref 0.3–4.2)
VIT D+METAB SERPL-MCNC: 32 NG/ML (ref 20–50)

## 2024-05-23 RX ORDER — METHIMAZOLE 10 MG/1
15 TABLET ORAL 2 TIMES DAILY
Qty: 135 TABLET | Refills: 0 | Status: SHIPPED | OUTPATIENT
Start: 2024-05-23 | End: 2024-06-17

## 2024-05-23 NOTE — RESULT ENCOUNTER NOTE
Team - please let patient know that Graves' antibody is positive, confirms Graves' disease. Recommend starting methimazole 15 mg twice/day.  Recheck labs in 2-4 weeks. Pt to let us know if any side effects.    Regards,   Danilo Ramirez MD

## 2024-05-28 ENCOUNTER — OFFICE VISIT (OUTPATIENT)
Dept: OPHTHALMOLOGY | Facility: CLINIC | Age: 49
End: 2024-05-28
Attending: STUDENT IN AN ORGANIZED HEALTH CARE EDUCATION/TRAINING PROGRAM
Payer: COMMERCIAL

## 2024-05-28 DIAGNOSIS — H57.89 THYROID EYE DISEASE: Primary | ICD-10-CM

## 2024-05-28 DIAGNOSIS — E07.9 THYROID EYE DISEASE: Primary | ICD-10-CM

## 2024-05-28 DIAGNOSIS — E05.90 THYROTOXICOSIS WITHOUT THYROID STORM, UNSPECIFIED THYROTOXICOSIS TYPE: ICD-10-CM

## 2024-05-28 LAB — TSI SER-ACNC: 3.1 TSI INDEX

## 2024-05-28 PROCEDURE — 99214 OFFICE O/P EST MOD 30 MIN: CPT

## 2024-05-28 PROCEDURE — 92004 COMPRE OPH EXAM NEW PT 1/>: CPT | Mod: GC | Performed by: OPHTHALMOLOGY

## 2024-05-28 ASSESSMENT — VISUAL ACUITY
METHOD_MR: PT. DEFERS
METHOD: SNELLEN - LINEAR
OD_CC: 20/40
OD_PH_CC: 20/25
OS_CC: 20/20
OS_PH_CC: 20/25

## 2024-05-28 ASSESSMENT — EXTERNAL EXAM - RIGHT EYE: OD_EXAM: NORMAL

## 2024-05-28 ASSESSMENT — CONF VISUAL FIELD
OD_SUPERIOR_TEMPORAL_RESTRICTION: 0
OS_INFERIOR_TEMPORAL_RESTRICTION: 0
OD_NORMAL: 1
OS_SUPERIOR_TEMPORAL_RESTRICTION: 0
OS_SUPERIOR_NASAL_RESTRICTION: 0
OD_INFERIOR_TEMPORAL_RESTRICTION: 0
OS_NORMAL: 1
OD_SUPERIOR_NASAL_RESTRICTION: 0
OD_INFERIOR_NASAL_RESTRICTION: 0
OS_INFERIOR_NASAL_RESTRICTION: 0

## 2024-05-28 ASSESSMENT — REFRACTION_WEARINGRX
SPECS_TYPE: PAL
OD_SPHERE: -1.75
OS_CYLINDER: +0.75
OS_ADD: +1.25
OD_AXIS: 152
OS_SPHERE: -2.25
OD_CYLINDER: +1.00
OS_AXIS: 020
OD_ADD: +1.25

## 2024-05-28 ASSESSMENT — TONOMETRY
IOP_METHOD: TONOPEN
OS_IOP_MMHG: 14
OD_IOP_MMHG: 14

## 2024-05-28 ASSESSMENT — MARGIN REFLEX DISTANCE
OD_MRD1: 4
OS_MRD2: 3
OS_MRD1: 4
OD_MRD2: 3

## 2024-05-28 ASSESSMENT — CUP TO DISC RATIO
OS_RATIO: 0.4
OD_RATIO: 0.4

## 2024-05-28 ASSESSMENT — EXTERNAL EXAM - LEFT EYE: OS_EXAM: NORMAL

## 2024-05-28 NOTE — PROGRESS NOTES
HPI       Eye Discharge Both Eyes    In both eyes.  Duration of 6 months.             Comments    Pt. States that she has had swelling and discharge BE since December. Does had a deep aching pain LE every morning. Occasional pain RE. No change in VA BE. No flashes or floaters BE.   Rosamaria Brooke COT 11:59 AM May 28, 2024             Last edited by Rosamaria Brooke on 5/28/2024 11:59 AM.          Ophthalmology Acute Clinic     HPI:   Rd Hyman is a 48 year old female who presents with 3 months of bilateral eyelid swelling, discharge, and eye pain  L >R. Patient with recent diagnosis of hyperthyroidism in March 2024. Denies any double vision. Denies any pain with EOM.     Started on methimazole 5 days ago.     Thyroid history:  Diagnosed when? May 2025 (Started on methimazole 5 days ago)  STEWARD: None  Thyroidectomy: None    Exam  Fátima (base):15/15 (91/)     Better/worse same: Initial  Strabismus (better/worse/same): Initial   Eyelid retraction (better/worse/same): Initial     EBONI Score:  1. Spontaneous orbital pain.     1  2. Gaze evoked orbital pain.     0  3. Eyelid swelling due to active thyroid eye disease  1  4. Eyelid erythema.      1  5. Conjunctival redness due to active thyroid eye disease . 0  6. Chemosis.        0  7. Inflammation of caruncle OR plica.   0    Patients assessed after follow-up can be scored out of 10 by  including items 8-10.    8. Increase of > 2mm in proptosis.    N/A   9. Decrease in uniocular excursion in any direction of > 8 . N/A  10. Decrease of acuity equivalent to 1 Snellen line.  N/A    EBONI SCORE = 3/7    Sawyer Diplopia Score = 0  0 = no diplopia  1 = intermittent (when tired, upon waking, end of day etc)  2 = inconstant (extreme gazes)  3 = constant      Past Ocular history:   - Medical history: None  - Surgical history: None  - Glasses: Yes  - Contact lens wear: No  - Current Eye drops: No    PMH:     Past Medical History:   Diagnosis Date    Infertility        FH: No FH of  glaucoma or AMD.     Review of systems for the eyes was negative other than the pertinent positives/negatives listed in the HPI.      Labs:     TSI (date):3.1 (5/21/24)      Previous results:None    Assessment & Plan      Rd Hyman is a 48 year old female with the following diagnoses:   1. Thyroid eye disease    2. 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 The natural history of thyroid eye disease was discussed. We told the patient that typically KATHRYN will worsen for a period of time, then improve to some degree, and then stabilize without normalizing.  This process can take somewhere between 1 and 3 years on average. Patient interested in possible radiation therapy. Will place referral. In the meantime, we recommended seeing the patient in the [facility] in 3 months (sooner if the patient experiences worsening vision in either eye).  Once the patient becomes stable for at least 6 months' time, we discussed that the patient may need restorative surgery or prisms.  Finally, we discussed that correcting the thyroid hormone levels does not ensure that the eyes will normalize but that it could help to some degree.  We discussed possible treatment with intravenous steroids, orbital radiation, and Tepezza.  She is not a good candidate for Tepezza because her EBONI is only 3.  She would like a referral to radiation oncology.    Plan  - CT orbit without contrast   - Start selenium 100 mcg BID   - Referral to radiation oncology   - Follow up in KATHRYN clinic in 3 months     Patient disposition:   Return in about 3 months (around 8/28/2024) for KATHRYN clinic .    Patient seen with Dr. Washington Burton MD  Resident Physician, PGY-2  Department of Ophthalmology  05/28/24 12:53 PM    Attending Physician Attestation:  Complete documentation of historical and exam elements from today's encounter can be found in the full  encounter summary report (not reduplicated in this progress note).  I personally obtained the chief complaint(s) and history of present illness.  I confirmed and edited as necessary the review of systems, past medical/surgical history, family history, social history, and examination findings as documented by others; and I examined the patient myself.  I personally reviewed the relevant tests, images, and reports as documented above.  I formulated and edited as necessary the assessment and plan and discussed the findings and management plan with the patient and family. - Dax Delarosa MD

## 2024-05-28 NOTE — NURSING NOTE
Chief Complaints and History of Present Illnesses   Patient presents with    Eye Discharge Both Eyes     Chief Complaint(s) and History of Present Illness(es)       Eye Discharge Both Eyes              Laterality: both eyes    Duration: 6 months              Comments    Pt. States that she has had swelling and discharge BE since December. Does had a deep aching pain LE every morning. Occasional pain RE. No change in VA BE. No flashes or floaters BE.   Rosamaria Brooke COT 11:59 AM May 28, 2024

## 2024-05-29 ENCOUNTER — PATIENT OUTREACH (OUTPATIENT)
Dept: ONCOLOGY | Facility: CLINIC | Age: 49
End: 2024-05-29
Payer: COMMERCIAL

## 2024-05-29 NOTE — PROGRESS NOTES
New Patient Oncology Nurse Navigator Note     Referring provider: Dax Delarosa MD      Referring Clinic/Organization: [facility] EYE General      Referred to (specialty:) Radiation Oncology     Requested provider (if applicable): [facility] -[place] Location      Date Referral Received: May 28, 2024     Evaluation for:  H57.89, E07.9 (ICD-10-CM) - Thyroid eye disease      Clinical History (per Nurse review of records provided):      Rd Hyman is a 48 year old female with the following diagnoses:   1. Thyroid eye disease    2. 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 The natural history of thyroid eye disease was discussed. We told the patient that typically KATHRYN will worsen for a period of time, then improve to some degree, and then stabilize without normalizing.  This process can take somewhere between 1 and 3 years on average. Patient interested in possible radiation therapy. Will place referral. In the meantime, we recommended seeing the patient in the [facility] in 3 months (sooner if the patient experiences worsening vision in either eye).  Once the patient becomes stable for at least 6 months' time, we discussed that the patient may need restorative surgery or prisms.  Finally, we discussed that correcting the thyroid hormone levels does not ensure that the eyes will normalize but that it could help to some degree.  We discussed possible treatment with intravenous steroids, orbital radiation, and Tepezza.  She is not a good candidate for Tepezza because her EBONI is only 3.  She would like a referral to radiation oncology.     Plan  - CT orbit without contrast   - Start selenium 100 mcg BID   - Referral to radiation oncology   - Follow up in KATHRYN clinic in 3 months      Records Location: See Bookmarked material     Records Needed: NA     Additional testing  needed prior to consult: NA    Payor: [employer] / Plan: [employer] [place] / Product Type: Indemnity /     May 29, 2024  Referral received and reviewed.   Sent to [employer] for processing.

## 2024-05-31 NOTE — TELEPHONE ENCOUNTER
MEDICAL RECORDS REQUEST   Radiation Oncology  [address] [place] [address]  Fax: [phone]      FUTURE VISIT INFORMATION                                                   WILLIAM Barnhart: 1975 scheduled for future visit at [facility] Radiation Oncology    RECORDS REQUESTED FOR VISIT                                                     Action    Action Taken 2024 3:10pm SHUKRI   I called Paulina - her phone went to . I called again - unavailable.     6/3/2024 4:30pm SHUKRI   I called Paulina again- no hx of radiation oncology      Thyroid eye disease      OFFICE NOTE from PCP  Dax Delarosa MD - Referred provider    OFFICE NOTE from medical oncologist     MEDICATION LIST Complete EPIC   LABS     ANYTHING RELATED TO DIAGNOSIS Complete Labs last updated on 2024    *[facility] and [facility] Radiation Oncology patients send to [facility] with ATTN: KELBY/Aron Dosi/Physics    *only [facility] patient's, use [facility]

## 2024-06-04 ENCOUNTER — ANCILLARY PROCEDURE (OUTPATIENT)
Dept: CT IMAGING | Facility: CLINIC | Age: 49
End: 2024-06-04
Attending: OPHTHALMOLOGY
Payer: COMMERCIAL

## 2024-06-04 DIAGNOSIS — E07.9 THYROID EYE DISEASE: ICD-10-CM

## 2024-06-04 DIAGNOSIS — H57.89 THYROID EYE DISEASE: ICD-10-CM

## 2024-06-04 PROCEDURE — 70480 CT ORBIT/EAR/FOSSA W/O DYE: CPT | Mod: TC | Performed by: RADIOLOGY

## 2024-06-05 ENCOUNTER — VIRTUAL VISIT (OUTPATIENT)
Dept: ENDOCRINOLOGY | Facility: CLINIC | Age: 49
End: 2024-06-05
Payer: COMMERCIAL

## 2024-06-05 DIAGNOSIS — E05.90 THYROTOXICOSIS WITHOUT THYROID STORM, UNSPECIFIED THYROTOXICOSIS TYPE: Primary | ICD-10-CM

## 2024-06-05 PROCEDURE — 99214 OFFICE O/P EST MOD 30 MIN: CPT | Mod: 95 | Performed by: INTERNAL MEDICINE

## 2024-06-05 PROCEDURE — G2211 COMPLEX E/M VISIT ADD ON: HCPCS | Mod: 95 | Performed by: INTERNAL MEDICINE

## 2024-06-05 NOTE — PROGRESS NOTES
Video-Visit Details    Type of service:  Video Visit  Video Start Time: 0802  Video End Time: 0819  Originating Location (pt. Location): Home, [place]  Distant Location (provider location):  Home  Platform used for Video Visit: Marjorie Ramirez MD      HISTORY OF PRESENT ILLNESS  Rd Hyman is a 48 year old female with  who is here for follow up.    Interval History  Appetite is better  Overall feeling better  Feeling better walking upstairs and not too tired  Shakiness and palpitation improve  Tired but energy level is better  Legs swelling are better   Rashes about the same, had rashes before MMI and after taking MMI, rash is about the same  Dermatology contacted the patient and since the appointment is about 6 months out, pt will see her primary instead next months    Pt was seen by ophthalmologist, reported that she will have eye radiation daily for 2 weeks to help with the swelling  2 days ago, left eye is bleeding, no pain. Getting better      Initial visit  Patient initially seen by Dr. Zayas and then by me.    She was diagnosed with COVID19 in 12/2023. After this she started to develop weight loss, her weight at that time was 168 lbs and now she is weighing 129 lbs. She feels colder than usual. She has been experiencing tremors, palpitations associated with shortness of breath. She also has noticed weakness most notable when she is climbing stairs. She has not noticed neck swelling..     Her eyelids have been swollen and with eye pain. No blurry vision or diplopia.     Since onset symptoms have been persistent.     Last month she presented to ER for evaluation of flank pain. Found to have a kidney stone. Recently her serum calcium was 10.8. She has no prior history of hypercalcemia.     REVIEW OF SYSTEMS  10 point negative except as mentioned in HPI    Past Medical/Surgical History:  Past Medical History:   Diagnosis Date    Infertility      Past Surgical History:   Procedure Laterality Date     DILATION AND CURETTAGE  06/10/2014    DILATION AND CURETTAGE N/A 6/10/2014    Procedure: DILATION AND CURETTAGE;  Surgeon: Toni Shepard MD;  Location: [facility] [place];  Service:        Medications  Current Outpatient Medications   Medication Sig Dispense Refill    Cholecalciferol (VITAMIN D-3) 25 MCG (1000 UT) CAPS       Cyanocobalamin (B-12) 1000 MCG TBCR Take 1 tablet by mouth daily      fexofenadine (ALLEGRA) 180 MG tablet Take 1 tablet (180 mg) by mouth daily 90 tablet 1    ibuprofen (ADVIL/MOTRIN) 600 MG tablet Take 1 tablet (600 mg) by mouth every 8 hours as needed for moderate pain 30 tablet 0    loratadine (CLARITIN) 10 MG tablet Take 10 mg by mouth daily      methimazole (TAPAZOLE) 10 MG tablet Take 1.5 tablets (15 mg) by mouth 2 times daily for 45 days 135 tablet 0    Misc Natural Products (FOCUSED MIND PO)       multivitamin w/minerals (THERA-VIT-M) tablet Take by mouth daily       No current facility-administered medications for this visit.       Allergies  Allergies   Allergen Reactions    Cat Hair Extract Other (See Comments)         Family History  family history is not on file.    Social History  Social History     Tobacco Use    Smoking status: Never     Passive exposure: Current ( smokes outside)    Smokeless tobacco: Never   Substance Use Topics    Alcohol use: Not Currently       Physical Exam  There were no vitals taken for this visit.  There is no height or weight on file to calculate BMI.  GENERAL :  In no apparent distress  EYES: No proptosis, some periorbital swelling, small left eye hemorrhage  RESP: Normal breathing  NEURO: awake, alert, responds appropriately to questions.      DATA REVIEW  Labs/Imaging    Thyrotropin Receptor Antibody   Date Value Ref Range Status   05/21/2024 15 (H) 0.00 - 1.75 IU/L Final     Comment:        -------------------ADDITIONAL INFORMATION-------------------  At a decision limit of 1.75 IU/L, this assay   has 97% sensitivity and 99% specificity for    detection of Graves' disease. In healthy   individuals and in patients with thyroid disease   without diagnosis of Graves' disease, the upper   limit of anti-TSHR values are 1.22 IU/L and   1.58 IU/L, respectively (97.5th percentiles).     Test Performed by:  [facility]  [address] [place] [address]  : Quincy Crandall M.D. Ph.D.; CLIA# [id number]     AST   Date Value Ref Range Status   04/02/2024 36 0 - 45 U/L Final     Comment:     Reference intervals for this test were updated on 6/12/2023 to more accurately reflect our healthy population. There may be differences in the flagging of prior results with similar values performed with this method. Interpretation of those prior results can be made in the context of the updated reference intervals.     ALT   Date Value Ref Range Status   04/02/2024 34 0 - 50 U/L Final     Comment:     Reference intervals for this test were updated on 6/12/2023 to more accurately reflect our healthy population. There may be differences in the flagging of prior results with similar values performed with this method. Interpretation of those prior results can be made in the context of the updated reference intervals.       Alkaline Phosphatase   Date Value Ref Range Status   04/02/2024 145 40 - 150 U/L Final     Comment:     Reference intervals for this test were updated on 11/14/2023 to more accurately reflect our healthy population. There may be differences in the flagging of prior results with similar values performed with this method. Interpretation of those prior results can be made in the context of the updated reference intervals.     WBC Count   Date Value Ref Range Status   04/02/2024 9.4 4.0 - 11.0 10e3/uL Final         ASSESSMENT/PLAN:   ## Graves' disease, start MMI 5/23/2024  ## Graves' eye disease. Follow-up ophthalmology  ## Hypokalemia  ## Hypercalcemia  ## H/o kidney stone  ## Itchy skin and  intermittent rash prior to MMI  Pt could not tolerate cholestyramine in the past and declined betablocker  Overall feeling better, no SE on MMI  Rashes are stable  -- continue MMI 15 mg BID, recheck labs. Pt will let me know if any side effects.      Follow-up 1 month  Information for patients on Tapazole or Propylthiouracil (PTU)  The generic name for Tapazole is methimazole.      The drugs, Tapazole and PTU are used to treat an overactive thyroid (hyperthyroidism).      They prevent the thyroid from making too much thyroid hormone.  You can think of them as putting up a road block in your thyroid.    These drugs are usually well tolerated and safe, but rarely can cause serious side effects.  The two worst potential side-effects are:  agranulocytosis.  This is the loss of the white blood cells that fight infection.  This can occur in approximately 1 in 200 people.  liver problems.  This is even more rare than agranulocytosis but it can be very serious.    Because of the serious nature of the rare side-effects, you should notify your doctor if you develop the following symptoms while taking the drug:  Fever  sore throat  flu-like symptoms (nausea, vomiting, diarrhea, aches, abdominal pain)    In addition, anytime you have evidence of infection, you should get a blood test to be sure that you do not have agranulocytosis.  A prescription will be provided to you to get this blood test as needed.  This is an extra precaution and the results should be available the same day the blood test is drawn.  If your blood count is OK (which it almost always is), then you may continue to take the antithyroid drug.    While taking this medication, your doctor will probably want to see you frequently, every 1-2 months, at least for a time.  This is important so that the response can be monitored and the dose can be adjusted.      If you have concerns you may reach us at [phone] during regular hours, and [phone] (ask for  endocrinologist on call) after hours.      Orders Placed This Encounter   Procedures    TSH with free T4 reflex    Basic metabolic panel     The longitudinal plan of care for the diagnosis(es)/condition(s) as documented were addressed during this visit. Due to the added complexity in care, I will continue to support Paulina in the subsequent management and with ongoing continuity of care.

## 2024-06-05 NOTE — NURSING NOTE
Is the patient currently in the state of [place]? YES    Visit mode:VIDEO    If the visit is dropped, the patient can be reconnected by: VIDEO VISIT: Send to e-mail at: [email or web address]    Will anyone else be joining the visit? NO  (If patient encounters technical issues they should call [phone])    How would you like to obtain your AVS? MyChart    Are changes needed to the allergy or medication list? Pt stated no med changes    Are refills needed on medications prescribed by this physician? NO    Reason for visit: VINAY MIKE

## 2024-06-05 NOTE — LETTER
6/5/2024      Rd Hyman  [address]  [place] [address]      Dear Colleague,    Thank you for referring your patient, Rd Hyman, to the [facility] [place]. Please see a copy of my visit note below.    Video-Visit Details    Type of service:  Video Visit  Video Start Time: 0802  Video End Time: 0819  Originating Location (pt. Location): Home, [place]  Distant Location (provider location):  Home  Platform used for Video Visit: Marjorie Ramirez MD      HISTORY OF PRESENT ILLNESS  Rd Hyman is a 48 year old female with  who is here for follow up.    Interval History  Appetite is better  Overall feeling better  Feeling better walking upstairs and not too tired  Shakiness and palpitation improve  Tired but energy level is better  Legs swelling are better   Rashes about the same, had rashes before MMI and after taking MMI, rash is about the same  Dermatology contacted the patient and since the appointment is about 6 months out, pt will see her primary instead next months    Pt was seen by ophthalmologist, reported that she will have eye radiation daily for 2 weeks to help with the swelling  2 days ago, left eye is bleeding, no pain. Getting better      Initial visit  Patient initially seen by Dr. Zayas and then by me.    She was diagnosed with COVID19 in 12/2023. After this she started to develop weight loss, her weight at that time was 168 lbs and now she is weighing 129 lbs. She feels colder than usual. She has been experiencing tremors, palpitations associated with shortness of breath. She also has noticed weakness most notable when she is climbing stairs. She has not noticed neck swelling..     Her eyelids have been swollen and with eye pain. No blurry vision or diplopia.     Since onset symptoms have been persistent.     Last month she presented to ER for evaluation of flank pain. Found to have a kidney stone. Recently her serum calcium was 10.8. She has no prior history  of hypercalcemia.     REVIEW OF SYSTEMS  10 point negative except as mentioned in HPI    Past Medical/Surgical History:  Past Medical History:   Diagnosis Date     Infertility      Past Surgical History:   Procedure Laterality Date     DILATION AND CURETTAGE  06/10/2014     DILATION AND CURETTAGE N/A 6/10/2014    Procedure: DILATION AND CURETTAGE;  Surgeon: Toni Shepard MD;  Location: [facility] [place];  Service:        Medications  Current Outpatient Medications   Medication Sig Dispense Refill     Cholecalciferol (VITAMIN D-3) 25 MCG (1000 UT) CAPS        Cyanocobalamin (B-12) 1000 MCG TBCR Take 1 tablet by mouth daily       fexofenadine (ALLEGRA) 180 MG tablet Take 1 tablet (180 mg) by mouth daily 90 tablet 1     ibuprofen (ADVIL/MOTRIN) 600 MG tablet Take 1 tablet (600 mg) by mouth every 8 hours as needed for moderate pain 30 tablet 0     loratadine (CLARITIN) 10 MG tablet Take 10 mg by mouth daily       methimazole (TAPAZOLE) 10 MG tablet Take 1.5 tablets (15 mg) by mouth 2 times daily for 45 days 135 tablet 0     Misc Natural Products (FOCUSED MIND PO)        multivitamin w/minerals (THERA-VIT-M) tablet Take by mouth daily       No current facility-administered medications for this visit.       Allergies  Allergies   Allergen Reactions     Cat Hair Extract Other (See Comments)         Family History  family history is not on file.    Social History  Social History     Tobacco Use     Smoking status: Never     Passive exposure: Current ( smokes outside)     Smokeless tobacco: Never   Substance Use Topics     Alcohol use: Not Currently       Physical Exam  There were no vitals taken for this visit.  There is no height or weight on file to calculate BMI.  GENERAL :  In no apparent distress  EYES: No proptosis, some periorbital swelling, small left eye hemorrhage  RESP: Normal breathing  NEURO: awake, alert, responds appropriately to questions.      DATA REVIEW  Labs/Imaging    Thyrotropin Receptor  Antibody   Date Value Ref Range Status   05/21/2024 15 (H) 0.00 - 1.75 IU/L Final     Comment:        -------------------ADDITIONAL INFORMATION-------------------  At a decision limit of 1.75 IU/L, this assay   has 97% sensitivity and 99% specificity for   detection of Graves' disease. In healthy   individuals and in patients with thyroid disease   without diagnosis of Graves' disease, the upper   limit of anti-TSHR values are 1.22 IU/L and   1.58 IU/L, respectively (97.5th percentiles).     Test Performed by:  [facility]  [address] [place] [address]  : Quincy Crandall M.D. Ph.D.; CLIA# [id number]     AST   Date Value Ref Range Status   04/02/2024 36 0 - 45 U/L Final     Comment:     Reference intervals for this test were updated on 6/12/2023 to more accurately reflect our healthy population. There may be differences in the flagging of prior results with similar values performed with this method. Interpretation of those prior results can be made in the context of the updated reference intervals.     ALT   Date Value Ref Range Status   04/02/2024 34 0 - 50 U/L Final     Comment:     Reference intervals for this test were updated on 6/12/2023 to more accurately reflect our healthy population. There may be differences in the flagging of prior results with similar values performed with this method. Interpretation of those prior results can be made in the context of the updated reference intervals.       Alkaline Phosphatase   Date Value Ref Range Status   04/02/2024 145 40 - 150 U/L Final     Comment:     Reference intervals for this test were updated on 11/14/2023 to more accurately reflect our healthy population. There may be differences in the flagging of prior results with similar values performed with this method. Interpretation of those prior results can be made in the context of the updated reference intervals.     WBC Count   Date Value Ref  Range Status   04/02/2024 9.4 4.0 - 11.0 10e3/uL Final         ASSESSMENT/PLAN:   ## Graves' disease, start MMI 5/23/2024  ## Graves' eye disease. Follow-up ophthalmology  ## Hypokalemia  ## Hypercalcemia  ## H/o kidney stone  ## Itchy skin and intermittent rash prior to MMI  Pt could not tolerate cholestyramine in the past and declined betablocker  Overall feeling better, no SE on MMI  Rashes are stable  -- continue MMI 15 mg BID, recheck labs. Pt will let me know if any side effects.      Follow-up 1 month  Information for patients on Tapazole or Propylthiouracil (PTU)  The generic name for Tapazole is methimazole.      The drugs, Tapazole and PTU are used to treat an overactive thyroid (hyperthyroidism).      They prevent the thyroid from making too much thyroid hormone.  You can think of them as putting up a road block in your thyroid.    These drugs are usually well tolerated and safe, but rarely can cause serious side effects.  The two worst potential side-effects are:  agranulocytosis.  This is the loss of the white blood cells that fight infection.  This can occur in approximately 1 in 200 people.  liver problems.  This is even more rare than agranulocytosis but it can be very serious.    Because of the serious nature of the rare side-effects, you should notify your doctor if you develop the following symptoms while taking the drug:  Fever  sore throat  flu-like symptoms (nausea, vomiting, diarrhea, aches, abdominal pain)    In addition, anytime you have evidence of infection, you should get a blood test to be sure that you do not have agranulocytosis.  A prescription will be provided to you to get this blood test as needed.  This is an extra precaution and the results should be available the same day the blood test is drawn.  If your blood count is OK (which it almost always is), then you may continue to take the antithyroid drug.    While taking this medication, your doctor will probably want to see you  frequently, every 1-2 months, at least for a time.  This is important so that the response can be monitored and the dose can be adjusted.      If you have concerns you may reach us at [phone] during regular hours, and [phone] (ask for endocrinologist on call) after hours.      Orders Placed This Encounter   Procedures     TSH with free T4 reflex     Basic metabolic panel     The longitudinal plan of care for the diagnosis(es)/condition(s) as documented were addressed during this visit. Due to the added complexity in care, I will continue to support Paulina in the subsequent management and with ongoing continuity of care.        Again, thank you for allowing me to participate in the care of your patient.        Sincerely,        Danilo Ramirez MD

## 2024-06-06 ENCOUNTER — PRE VISIT (OUTPATIENT)
Dept: RADIATION ONCOLOGY | Facility: HOSPITAL | Age: 49
End: 2024-06-06
Payer: COMMERCIAL

## 2024-06-06 ENCOUNTER — OFFICE VISIT (OUTPATIENT)
Dept: RADIATION ONCOLOGY | Facility: HOSPITAL | Age: 49
End: 2024-06-06
Attending: OPHTHALMOLOGY
Payer: COMMERCIAL

## 2024-06-06 VITALS
OXYGEN SATURATION: 99 % | RESPIRATION RATE: 16 BRPM | HEART RATE: 87 BPM | DIASTOLIC BLOOD PRESSURE: 75 MMHG | TEMPERATURE: 97.8 F | SYSTOLIC BLOOD PRESSURE: 110 MMHG

## 2024-06-06 DIAGNOSIS — E07.9 THYROID EYE DISEASE: ICD-10-CM

## 2024-06-06 DIAGNOSIS — H57.89 THYROID EYE DISEASE: ICD-10-CM

## 2024-06-06 PROCEDURE — 99212 OFFICE O/P EST SF 10 MIN: CPT | Performed by: RADIOLOGY

## 2024-06-06 PROCEDURE — 99205 OFFICE O/P NEW HI 60 MIN: CPT | Mod: 25 | Performed by: RADIOLOGY

## 2024-06-06 PROCEDURE — 77263 THER RADIOLOGY TX PLNG CPLX: CPT | Performed by: RADIOLOGY

## 2024-06-06 ASSESSMENT — PAIN SCALES - GENERAL: PAINLEVEL: NO PAIN (0)

## 2024-06-06 NOTE — PROGRESS NOTES
[facility] Radiation Oncology Consult Note     Patient: Rd Hyman  MRN: [id number]  Date of Service: 06/06/2024          Dax Delarosa MD  [address] [place] [id number]       Dear Dr. Delarosa:    Thank you very much for referring this patient for consideration of radiotherapy. As you know Ms. 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  Patient is referred to radiation oncology for evaluation and consideration of possible radiation therapy for local control and symptom relief.    HISTORY OF PRESENT ILLNESS:   Ms. Hyman is a 48 year old female who has been in current usual state of health until recently.  The patient presented with 3-month history of bilateral eye swelling, discharge, and mild eye pain for which she was a seeking further evaluation.  She was eventually diagnosed with hyper thyroidism in 3/2024 and started on methimazole.  Because of significant bilateral eye swelling, the patient was referred to ophthalmologist for further evaluation and treatment recommendation.  She was then diagnosed as thyroid eye disease with EBONI score 3/7.  Different treatment options including steroids, orbital radiation and Tepezza has been discussed with the patient.  She is not a good candidate for Tepezza because her EBONI is only 3.  Patient is referred to radiation oncology for evaluation and discussion of possible orbital radiation therapy for local control and symptom relief.    CHEMOTHERAPY HISTORY: Concurrent Chemotherapy: No    RADIATION THERAPY HISTORY: Prior Radiation: No    IMPLANTED CARDIAC DEVICE: none     PREGNANCY: The patient is informed not to be pregnant during the radiation therapy.      Current Outpatient Medications   Medication Sig Dispense Refill    Cholecalciferol (VITAMIN D-3) 25 MCG (1000 UT) CAPS       Cyanocobalamin (B-12) 1000 MCG TBCR Take 1 tablet by mouth daily      fexofenadine (ALLEGRA) 180 MG  tablet Take 1 tablet (180 mg) by mouth daily 90 tablet 1    ibuprofen (ADVIL/MOTRIN) 600 MG tablet Take 1 tablet (600 mg) by mouth every 8 hours as needed for moderate pain 30 tablet 0    loratadine (CLARITIN) 10 MG tablet Take 10 mg by mouth daily      methimazole (TAPAZOLE) 10 MG tablet Take 1.5 tablets (15 mg) by mouth 2 times daily for 45 days 135 tablet 0    Misc Natural Products (FOCUSED MIND PO)       multivitamin w/minerals (THERA-VIT-M) tablet Take by mouth daily       Past Medical History:   Diagnosis Date    Graves disease     Infertility     Thyroid eye disease      Past Surgical History:   Procedure Laterality Date    DILATION AND CURETTAGE  06/10/2014    DILATION AND CURETTAGE N/A 6/10/2014    Procedure: DILATION AND CURETTAGE;  Surgeon: Toni Shepard MD;  Location: [facility] [place] [place];  Service:      Allergies   Allergen Reactions    Cat Hair Extract Other (See Comments)     No family history on file.  Social History     Socioeconomic History    Marital status:      Spouse name: Not on file    Number of children: Not on file    Years of education: Not on file    Highest education level: Not on file   Occupational History    Occupation: CNA, Assisted Living   Tobacco Use    Smoking status: Never     Passive exposure: Current ( smokes outside)    Smokeless tobacco: Never   Vaping Use    Vaping status: Never Used   Substance and Sexual Activity    Alcohol use: Not Currently    Drug use: Never    Sexual activity: Not on file   Other Topics Concern    Not on file   Social History Narrative    Not on file     Social Determinants of Health     Financial Resource Strain: Not on file   Food Insecurity: Not on file   Transportation Needs: Not on file   Physical Activity: Not on file   Stress: Not on file   Social Connections: Not on file   Interpersonal Safety: Low Risk  (3/18/2024)    Interpersonal Safety     Do you feel physically and emotionally safe where you currently live?: Yes     Within  the past 12 months, have you been hit, slapped, kicked or otherwise physically hurt by someone?: No     Within the past 12 months, have you been humiliated or emotionally abused in other ways by your partner or ex-partner?: No   Housing Stability: Not on file        REVIEW OF SYMPTOMS:  A full 14-point review of systems was performed. Pertinent findings are noted in the HPI.    General  Constitutional  Constitutional (WDL): All constitutional elements are within defined limits  EENT  Eye Disorders  Eye Disorder (WDL): All eye disorder elements are within defined limits  Ear Disorders  Ear Disorder (WDL): All ear disorder elements are within defined limits  Respiratory  Respiratory  Respiratory (WDL): All respiratory elements are within defined limits  Cardiovascular  Cardiovascular  Cardiovascular (WDL): All cardiovascular elements are within defined limits  Gastrointestinal  Gastrointestinal  Gastrointestinal (WDL): All gastrointestinal elements are within defined limits  Musculoskeletal  Musculoskeletal and Connective Tissue Disorders  Musculoskeletal & Connective (WDL): All musculoskeletal & connective elements are within defined limits  Integumentary  Integumentary  Integumentary (WDL): All integumentary elements are within defined limits  Neurological  Neurosensory  Neurosensory (WDL): All neurosensory elements are within defined limits  Genitourinary/Reproductive  Genitourinary  Genitourinary (WDL): All genitourinary elements are within defined limits  Lymphatic  Lymph System Disorders  Lymph (WDL): All lymph elements are within defined limits  Pain  Pain Score: No Pain (0)  AUA Assessment                                                              Accompanied by  Accompanied By: spouse    ECOG Status: 0    Imaging: Reviewed    Objective:      PHYSICAL EXAMINATION:    /75   Pulse 87   Temp 97.8  F (36.6  C)   Resp 16   SpO2 99%     Gen: Alert, in NAD  Eyes: PERRL, EOMI, sclera anicteric.  There are  more noticeable bilateral eye puffiness with no evidence of irritation and infection.  HENT     Head: NC/AT     Ears: No external auricular lesions     Nose/sinus: No rhinorrhea or epistaxis     Oropharynx: MMM, no visible oral lesions  Neck: Supple, full ROM, no LAD  Pulm: No wheezing, stridor or respiratory distress  CV: Well-perfused, no cyanosis, no pedal edema  Back: No step-offs or pain to palpation along the thoracolumbar spine  Rectal: Deferred  : Deferred  Musculoskeletal: Normal muscle bulk and tone  Skin: Normal color and turgor  Neurologic: A/Ox3, CN II-XII intact, normal gait and station  Psychiatric: Appropriate mood and affect    Intent of Therapy: Curative  Side effects that may occur during or within weeks after Radiation Therapy    Fatigue and general weakness  Headache and scalp irritation  Loss of hair  Nausea, vomiting, and decrease in appetite  Darkening, irritation, itchiness, redness, dryness, peeling, thickening, scabbing, and ulceration of the scalp, neck and forehead    Side effects that may occur months or years after Radiation Therapy    Development of another tumor or cancer         Dizziness and balance problems  Decrease in hormone production  Brain inflammation or necrosis that may cause various neurologic symptoms  Seizures  Decrease in memory and thinking abilities  Decrease in hearing and feeling of ear congestion  Eye dryness and irritation  Loss of vision  Cataracts in the eyes  Poor healing after a trauma or surgery in the irradiated area  Facial numbness, pain and weakness    The risks, benefits and alternatives to radiation therapy were outlined with the patient. All questions were answered and a consent was signed.     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      Assessment & Plan:     I have personally reviewed her upcoming medical record today.  I have also reviewed her most recent  radiology study including CT scan.  Patient is a 48-year-old female was a new diagnosis of Thyrotoxicosis without thyroid storm, unspecified thyrotoxicosis type and clinically symptomatic bilateral thyroid eye disease.  The possible treatment options including surgery, systemic therapy, and radiation therapy has been discussed with the patient in detail and anticoagulants.  The possible risks and side effects of radiation therapy has also been explained to the patient.  Questions are answered to patient's satisfaction.  I agree the patient is a good candidate to consider a short course of orbital radiation therapy for local control and symptom relief.  The radiation therapy is offered to the patient and she is willing to proceed being aware of potential risks and side effects involved.  She is scheduled to return to radiation oncology next week for simulation.  I plan to give her radiation therapy to a total dose of 2000 cGy in 10 treatments targeted to the both right and the left orbits.    Patient also asked about possible steroid therapy.  I will let the decision to be made by Dr. Dax Delarosa.    Again, thank you very much for the referral and allowing me to participate in the care of this patient.  If you have any questions or concerns about this consultation, please do not hesitate to call.  I spent approximately 45 minutes today with the patient and 80% time was used for counseling.      Sincerely,          Socorro Stuart MD, PhD  Department of Radiation Oncology   [facility] Radiation Oncology  Tel: [phone]  Cell: [phone]    [facility]  [address] [place] [address]     [facility]   [address] [facility] [facility] [place] [address]    CC:  Patient Care Team:  Kellie Torres MD as PCP - General (Family Medicine)  Kellie Torres MD Brown, Neil E, MD as MD (Otolaryngology)  Shiela Izquierdo NP as Nurse Practitioner  Markus Dietz PA-C as Assigned PCP  Danilo Ramirez MD as Physician  (Endocrinology, Diabetes, and Metabolism)  Dax Blakely MD as MD (Dermatology)  Danilo Ramirez MD as Assigned Endocrinology Provider  Socorro Stuart MD as MD (Radiation Oncology)

## 2024-06-06 NOTE — PROGRESS NOTES
"Considerations for radiation treatment   Pregnancy status: If none of the above, pregnancy status must be documented.  @LASTLAB(HCGQUANT)@    PT KNOWS WE'LL DO A UPT PRIOR TO HER CT SIMULATION  Implanted Cardiac Devices: No   Any previous radiation therapy: No      Oncology Rooming Note    June 6, 2024 1:18 PM   Rd Hyman is a 48 year old female who presents for:    Chief Complaint   Patient presents with    Oncology Clinic Visit    thyroid eye disease     Rad Onc consult     Initial Vitals: /75   Pulse 87   Temp 97.8  F (36.6  C)   Resp 16   SpO2 99%  Estimated body mass index is 24.37 kg/m  as calculated from the following:    Height as of 4/30/24: 1.549 m (5' 1\").    Weight as of 4/30/24: 58.5 kg (129 lb). There is no height or weight on file to calculate BSA.  No Pain (0) Comment: Data Unavailable   No LMP recorded.  Allergies reviewed: No  Medications reviewed: No, done yesterday    Medications: Medication refills not needed today.  Pharmacy name entered into [employer] App:    [id number] [address]  AT [facility] AND [facility]      Clinical concerns: Pt has Graves Disease, and thyroid eye disease from which she states she is asymptomatic. Here to talk with Dr. Stuart about bilateral eye radiation.   Dr. Stuart was notified.    Radiation Therapy Patient Education    Person involved with teaching: Patient and     Patient educational needs for self management of treatment-related side effects assessment completed.  [facility] Patient Ed tab contains Patient Learning Assessment    Education Materials Given  none    Educational Topics Discussed  Side effects expected, Skin care, and Activity    Response To Teaching  Verbalizes understanding    GYN Only  Vaginal Dilator-given and educated: N/A    Referrals sent: None    Chemotherapy?  No        Radha Alexandra RN              "

## 2024-06-06 NOTE — LETTER
"6/6/2024      Rd Hyman  [address]  [facility] [address]      Dear Colleague,    Thank you for referring your patient, Rd Hyman, to the [facility] RADIATION ONCOLOGY [place]. Please see a copy of my visit note below.    Considerations for radiation treatment   Pregnancy status: If none of the above, pregnancy status must be documented.  @LASTLAB(HCGQUANT)@    PT KNOWS WE'LL DO A UPT PRIOR TO HER CT SIMULATION  Implanted Cardiac Devices: No   Any previous radiation therapy: No      Oncology Rooming Note    June 6, 2024 1:18 PM   Rd Hyman is a 48 year old female who presents for:    Chief Complaint   Patient presents with     Oncology Clinic Visit     thyroid eye disease     Rad Onc consult     Initial Vitals: /75   Pulse 87   Temp 97.8  F (36.6  C)   Resp 16   SpO2 99%  Estimated body mass index is 24.37 kg/m  as calculated from the following:    Height as of 4/30/24: 1.549 m (5' 1\").    Weight as of 4/30/24: 58.5 kg (129 lb). There is no height or weight on file to calculate BSA.  No Pain (0) Comment: Data Unavailable   No LMP recorded.  Allergies reviewed: No  Medications reviewed: No, done yesterday    Medications: Medication refills not needed today.  Pharmacy name entered into [employer]:    [id number] [address] [facility]      Clinical concerns: Pt has Graves Disease, and thyroid eye disease from which she states she is asymptomatic. Here to talk with Dr. Stuart about bilateral eye radiation.   Dr. Stuart was notified.    Radiation Therapy Patient Education    Person involved with teaching: Patient and     Patient educational needs for self management of treatment-related side effects assessment completed.  [facility] Patient Ed tab contains Patient Learning Assessment    Education Materials Given  none    Educational Topics Discussed  Side effects expected, Skin care, and " Activity    Response To Teaching  Verbalizes understanding    GYN Only  Vaginal Dilator-given and educated: N/A    Referrals sent: None    Chemotherapy?  No        Radha Alexandra RN                [facility] Radiation Oncology Consult Note     Patient: Rd Hyman  MRN: [id number]  Date of Service: 06/06/2024          Dax Delarosa MD  [address] [place] [id number]       Dear Dr. Delarosa:    Thank you very much for referring this patient for consideration of radiotherapy. As you know Ms. 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  Patient is referred to radiation oncology for evaluation and consideration of possible radiation therapy for local control and symptom relief.    HISTORY OF PRESENT ILLNESS:   Ms. Hyman is a 48 year old female who has been in current usual state of health until recently.  The patient presented with 3-month history of bilateral eye swelling, discharge, and mild eye pain for which she was a seeking further evaluation.  She was eventually diagnosed with hyper thyroidism in 3/2024 and started on methimazole.  Because of significant bilateral eye swelling, the patient was referred to ophthalmologist for further evaluation and treatment recommendation.  She was then diagnosed as thyroid eye disease with EBONI score 3/7.  Different treatment options including steroids, orbital radiation and Tepezza has been discussed with the patient.  She is not a good candidate for Tepezza because her EBONI is only 3.  Patient is referred to radiation oncology for evaluation and discussion of possible orbital radiation therapy for local control and symptom relief.    CHEMOTHERAPY HISTORY: Concurrent Chemotherapy: No    RADIATION THERAPY HISTORY: Prior Radiation: No    IMPLANTED CARDIAC DEVICE: none     PREGNANCY: The patient is informed not to be pregnant during the radiation therapy.      Current Outpatient  Medications   Medication Sig Dispense Refill     Cholecalciferol (VITAMIN D-3) 25 MCG (1000 UT) CAPS        Cyanocobalamin (B-12) 1000 MCG TBCR Take 1 tablet by mouth daily       fexofenadine (ALLEGRA) 180 MG tablet Take 1 tablet (180 mg) by mouth daily 90 tablet 1     ibuprofen (ADVIL/MOTRIN) 600 MG tablet Take 1 tablet (600 mg) by mouth every 8 hours as needed for moderate pain 30 tablet 0     loratadine (CLARITIN) 10 MG tablet Take 10 mg by mouth daily       methimazole (TAPAZOLE) 10 MG tablet Take 1.5 tablets (15 mg) by mouth 2 times daily for 45 days 135 tablet 0     Misc Natural Products (FOCUSED MIND PO)        multivitamin w/minerals (THERA-VIT-M) tablet Take by mouth daily       Past Medical History:   Diagnosis Date     Graves disease      Infertility      Thyroid eye disease      Past Surgical History:   Procedure Laterality Date     DILATION AND CURETTAGE  06/10/2014     DILATION AND CURETTAGE N/A 6/10/2014    Procedure: DILATION AND CURETTAGE;  Surgeon: Toni Shepard MD;  [place] [facility] [place]  Service:      Allergies   Allergen Reactions     Cat Hair Extract Other (See Comments)     No family history on file.  Social History     Socioeconomic History     Marital status:      Spouse name: Not on file     Number of children: Not on file     Years of education: Not on file     Highest education level: Not on file   Occupational History     Occupation: CNA, Assisted Living   Tobacco Use     Smoking status: Never     Passive exposure: Current ( smokes outside)     Smokeless tobacco: Never   Vaping Use     Vaping status: Never Used   Substance and Sexual Activity     Alcohol use: Not Currently     Drug use: Never     Sexual activity: Not on file   Other Topics Concern     Not on file   Social History Narrative     Not on file     Social Determinants of Health     Financial Resource Strain: Not on file   Food Insecurity: Not on file   Transportation Needs: Not on file   Physical  Activity: Not on file   Stress: Not on file   Social Connections: Not on file   Interpersonal Safety: Low Risk  (3/18/2024)    Interpersonal Safety      Do you feel physically and emotionally safe where you currently live?: Yes      Within the past 12 months, have you been hit, slapped, kicked or otherwise physically hurt by someone?: No      Within the past 12 months, have you been humiliated or emotionally abused in other ways by your partner or ex-partner?: No   Housing Stability: Not on file        REVIEW OF SYMPTOMS:  A full 14-point review of systems was performed. Pertinent findings are noted in the HPI.    General  Constitutional  Constitutional (WDL): All constitutional elements are within defined limits  EENT  Eye Disorders  Eye Disorder (WDL): All eye disorder elements are within defined limits  Ear Disorders  Ear Disorder (WDL): All ear disorder elements are within defined limits  Respiratory  Respiratory  Respiratory (WDL): All respiratory elements are within defined limits  Cardiovascular  Cardiovascular  Cardiovascular (WDL): All cardiovascular elements are within defined limits  Gastrointestinal  Gastrointestinal  Gastrointestinal (WDL): All gastrointestinal elements are within defined limits  Musculoskeletal  Musculoskeletal and Connective Tissue Disorders  Musculoskeletal & Connective (WDL): All musculoskeletal & connective elements are within defined limits  Integumentary  Integumentary  Integumentary (WDL): All integumentary elements are within defined limits  Neurological  Neurosensory  Neurosensory (WDL): All neurosensory elements are within defined limits  Genitourinary/Reproductive  Genitourinary  Genitourinary (WDL): All genitourinary elements are within defined limits  Lymphatic  Lymph System Disorders  Lymph (WDL): All lymph elements are within defined limits  Pain  Pain Score: No Pain (0)  AUA Assessment                                                              Accompanied  by  Accompanied By: spouse    ECOG Status: 0    Imaging: Reviewed    Objective:      PHYSICAL EXAMINATION:    /75   Pulse 87   Temp 97.8  F (36.6  C)   Resp 16   SpO2 99%     Gen: Alert, in NAD  Eyes: PERRL, EOMI, sclera anicteric.  There are more noticeable bilateral eye puffiness with no evidence of irritation and infection.  HENT     Head: NC/AT     Ears: No external auricular lesions     Nose/sinus: No rhinorrhea or epistaxis     Oropharynx: MMM, no visible oral lesions  Neck: Supple, full ROM, no LAD  Pulm: No wheezing, stridor or respiratory distress  CV: Well-perfused, no cyanosis, no pedal edema  Back: No step-offs or pain to palpation along the thoracolumbar spine  Rectal: Deferred  : Deferred  Musculoskeletal: Normal muscle bulk and tone  Skin: Normal color and turgor  Neurologic: A/Ox3, CN II-XII intact, normal gait and station  Psychiatric: Appropriate mood and affect    Intent of Therapy: Curative  Side effects that may occur during or within weeks after Radiation Therapy    Fatigue and general weakness  Headache and scalp irritation  Loss of hair  Nausea, vomiting, and decrease in appetite  Darkening, irritation, itchiness, redness, dryness, peeling, thickening, scabbing, and ulceration of the scalp, neck and forehead    Side effects that may occur months or years after Radiation Therapy    Development of another tumor or cancer         Dizziness and balance problems  Decrease in hormone production  Brain inflammation or necrosis that may cause various neurologic symptoms  Seizures  Decrease in memory and thinking abilities  Decrease in hearing and feeling of ear congestion  Eye dryness and irritation  Loss of vision  Cataracts in the eyes  Poor healing after a trauma or surgery in the irradiated area  Facial numbness, pain and weakness    The risks, benefits and alternatives to radiation therapy were outlined with the patient. All questions were answered and a consent was signed.      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      Assessment & Plan:     I have personally reviewed her upcoming medical record today.  I have also reviewed her most recent radiology study including CT scan.  Patient is a 48-year-old female was a new diagnosis of Thyrotoxicosis without thyroid storm, unspecified thyrotoxicosis type and clinically symptomatic bilateral thyroid eye disease.  The possible treatment options including surgery, systemic therapy, and radiation therapy has been discussed with the patient in detail and anticoagulants.  The possible risks and side effects of radiation therapy has also been explained to the patient.  Questions are answered to patient's satisfaction.  I agree the patient is a good candidate to consider a short course of orbital radiation therapy for local control and symptom relief.  The radiation therapy is offered to the patient and she is willing to proceed being aware of potential risks and side effects involved.  She is scheduled to return to radiation oncology next week for simulation.  I plan to give her radiation therapy to a total dose of 2000 cGy in 10 treatments targeted to the both right and the left orbits.    Patient also asked about possible steroid therapy.  I will let the decision to be made by Dr. Dax Delarosa.    Again, thank you very much for the referral and allowing me to participate in the care of this patient.  If you have any questions or concerns about this consultation, please do not hesitate to call.  I spent approximately 45 minutes today with the patient and 80% time was used for counseling.      Sincerely,          Socorro Stuart MD, PhD  Department of Radiation Oncology   [facility] Radiation Oncology  Tel: [phone]  Cell: [phone]    [facility]  [address] [place] [address]     [facility]   [address] [place]  [address]    CC:  Patient Care Team:  Kellie Torres MD as PCP - General (Family Medicine)  Kellie Torres MD Brown, Neil E, MD as MD (Otolaryngology)  Shiela Izquierdo NP as Nurse Practitioner  Markus Dietz PA-C as Assigned PCP  Danilo Ramirez MD as Physician (Endocrinology, Diabetes, and Metabolism)  Dax Blakely MD as MD (Dermatology)  Danilo Ramirez MD as Assigned Endocrinology Provider  Socorro Stuart MD as MD (Radiation Oncology)        Again, thank you for allowing me to participate in the care of your patient.        Sincerely,        Socorro Stuart MD

## 2024-06-11 ENCOUNTER — ALLIED HEALTH/NURSE VISIT (OUTPATIENT)
Dept: RADIATION ONCOLOGY | Facility: HOSPITAL | Age: 49
End: 2024-06-11
Attending: OPHTHALMOLOGY
Payer: COMMERCIAL

## 2024-06-11 DIAGNOSIS — E07.9 THYROID EYE DISEASE: Primary | ICD-10-CM

## 2024-06-11 DIAGNOSIS — H57.89 THYROID EYE DISEASE: Primary | ICD-10-CM

## 2024-06-11 LAB — HCG UR QL: NEGATIVE

## 2024-06-11 PROCEDURE — 77334 RADIATION TREATMENT AID(S): CPT | Performed by: RADIOLOGY

## 2024-06-11 PROCEDURE — 77334 RADIATION TREATMENT AID(S): CPT | Mod: 26 | Performed by: RADIOLOGY

## 2024-06-11 PROCEDURE — 77290 THER RAD SIMULAJ FIELD CPLX: CPT | Mod: 26 | Performed by: RADIOLOGY

## 2024-06-11 PROCEDURE — 81025 URINE PREGNANCY TEST: CPT | Performed by: RADIOLOGY

## 2024-06-11 PROCEDURE — 77290 THER RAD SIMULAJ FIELD CPLX: CPT | Performed by: RADIOLOGY

## 2024-06-11 NOTE — PROCEDURES
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       INDICATION:  The radiation therapy is recommended for patient for local control and symptom relief.      CONSENT:  The possible risks and side effects of radiation therapy have been discussed with the patient in detail and at great length.  Questions were answered to patient's satisfaction.  Written consent was obtained.      SIMULATION:  The patient is in the supine position with a headrest, facemask and under knee cushion to help keep same position during daily radiation therapy.  Tentative isocenter is set up in the center of the head/neck region.  We will acquire CT information to help us better locate the target and design the radiation therapy field.      BLOCKS:  Custom blocks will be drawn to minimize radiation to normal tissues and to protect normal organs, including, but not limited to, brain, cranial nerves, lens, spinal cord, bone and soft tissue.      DOSAGE:  I plan to give her radiation therapy at 200 cGy each fraction to a total of 2000 cGy in 10 treatments targeted to the left and her right orbital tissue only.  I will consider to use 3D conformal technique to help us to better locate target and to protect normal tissues.      Socorro Stuart MD, PhD  Department of Radiation Oncology   [facility] Radiation Oncology  Cell: [phone]    [facility]  [address] [place] [address]     [place] [address] [facility] [place] [id number]

## 2024-06-11 NOTE — PROCEDURES
Clinical Treatment Planning Note    This is a 48-year-old female with diagnosis of thyroid eye disease.  The radiation therapy is recommended to the patient. The patient is simulated today in the supine position with head rest, facemask and under knee cushion to help keep the same position during the daily radiation therapy. 2-4 field will be used to set up radiation therapy fields to include the left and the right orbital region with margin. I plan to give her radiation therapy at 200 cGy each fraction to a total of 2000 cGy in 10 treatments.        Socorro Stuart MD, PhD  Department of Radiation Oncology   [facility] Radiation Oncology  Cell: [phone]    [facility]  [address] [place] [address]     [facility]  [address]  [place] [address]

## 2024-06-13 ENCOUNTER — LAB (OUTPATIENT)
Dept: LAB | Facility: CLINIC | Age: 49
End: 2024-06-13
Payer: COMMERCIAL

## 2024-06-13 DIAGNOSIS — E05.90 THYROTOXICOSIS WITHOUT THYROID STORM, UNSPECIFIED THYROTOXICOSIS TYPE: ICD-10-CM

## 2024-06-13 PROCEDURE — 36415 COLL VENOUS BLD VENIPUNCTURE: CPT

## 2024-06-13 PROCEDURE — 84443 ASSAY THYROID STIM HORMONE: CPT

## 2024-06-13 PROCEDURE — 80048 BASIC METABOLIC PNL TOTAL CA: CPT

## 2024-06-13 PROCEDURE — 84439 ASSAY OF FREE THYROXINE: CPT

## 2024-06-14 ENCOUNTER — APPOINTMENT (OUTPATIENT)
Dept: RADIATION ONCOLOGY | Facility: HOSPITAL | Age: 49
End: 2024-06-14
Attending: OPHTHALMOLOGY
Payer: COMMERCIAL

## 2024-06-14 LAB
ANION GAP SERPL CALCULATED.3IONS-SCNC: 12 MMOL/L (ref 7–15)
BUN SERPL-MCNC: 6.9 MG/DL (ref 6–20)
CALCIUM SERPL-MCNC: 9.6 MG/DL (ref 8.6–10)
CHLORIDE SERPL-SCNC: 105 MMOL/L (ref 98–107)
CREAT SERPL-MCNC: 0.44 MG/DL (ref 0.51–0.95)
DEPRECATED HCO3 PLAS-SCNC: 23 MMOL/L (ref 22–29)
EGFRCR SERPLBLD CKD-EPI 2021: >90 ML/MIN/1.73M2
GLUCOSE SERPL-MCNC: 192 MG/DL (ref 70–99)
POTASSIUM SERPL-SCNC: 3.8 MMOL/L (ref 3.4–5.3)
SODIUM SERPL-SCNC: 140 MMOL/L (ref 135–145)
T4 FREE SERPL-MCNC: 1.41 NG/DL (ref 0.9–1.7)
TSH SERPL DL<=0.005 MIU/L-ACNC: <0.01 UIU/ML (ref 0.3–4.2)

## 2024-06-14 PROCEDURE — 77295 3-D RADIOTHERAPY PLAN: CPT | Performed by: RADIOLOGY

## 2024-06-14 PROCEDURE — 77300 RADIATION THERAPY DOSE PLAN: CPT | Mod: 26 | Performed by: RADIOLOGY

## 2024-06-14 PROCEDURE — 77300 RADIATION THERAPY DOSE PLAN: CPT | Performed by: RADIOLOGY

## 2024-06-14 PROCEDURE — 77334 RADIATION TREATMENT AID(S): CPT | Mod: 26 | Performed by: RADIOLOGY

## 2024-06-14 PROCEDURE — 77334 RADIATION TREATMENT AID(S): CPT | Performed by: RADIOLOGY

## 2024-06-14 PROCEDURE — 77295 3-D RADIOTHERAPY PLAN: CPT | Mod: 26 | Performed by: RADIOLOGY

## 2024-06-17 RX ORDER — METHIMAZOLE 10 MG/1
10 TABLET ORAL 2 TIMES DAILY
Qty: 120 TABLET | Refills: 0 | Status: SHIPPED | OUTPATIENT
Start: 2024-06-17 | End: 2024-07-05

## 2024-06-17 NOTE — RESULT ENCOUNTER NOTE
Hello -    Here are my comments about the recent results: thyroid hormone T4 and calcium are normal.  We can decrease methimazole from 15 mg twice/day to 10 mg twice/day.  Recheck labs in 1 month.    Regards,   Danilo Ramirez MD

## 2024-06-27 ENCOUNTER — APPOINTMENT (OUTPATIENT)
Dept: RADIATION ONCOLOGY | Facility: HOSPITAL | Age: 49
End: 2024-06-27
Attending: RADIOLOGY
Payer: COMMERCIAL

## 2024-06-27 PROCEDURE — 77280 THER RAD SIMULAJ FIELD SMPL: CPT | Performed by: RADIOLOGY

## 2024-06-27 PROCEDURE — 77412 RADIATION TX DELIVERY LVL 3: CPT | Performed by: RADIOLOGY

## 2024-06-27 PROCEDURE — 77280 THER RAD SIMULAJ FIELD SMPL: CPT | Mod: 26 | Performed by: RADIOLOGY

## 2024-06-28 ENCOUNTER — APPOINTMENT (OUTPATIENT)
Dept: RADIATION ONCOLOGY | Facility: HOSPITAL | Age: 49
End: 2024-06-28
Attending: RADIOLOGY
Payer: COMMERCIAL

## 2024-06-28 PROCEDURE — 77387 GUIDANCE FOR RADJ TX DLVR: CPT | Performed by: RADIOLOGY

## 2024-06-28 PROCEDURE — 77412 RADIATION TX DELIVERY LVL 3: CPT | Performed by: RADIOLOGY

## 2024-07-01 ENCOUNTER — APPOINTMENT (OUTPATIENT)
Dept: RADIATION ONCOLOGY | Facility: HOSPITAL | Age: 49
End: 2024-07-01
Attending: RADIOLOGY
Payer: COMMERCIAL

## 2024-07-01 VITALS
DIASTOLIC BLOOD PRESSURE: 76 MMHG | BODY MASS INDEX: 26.28 KG/M2 | WEIGHT: 139.1 LBS | HEART RATE: 68 BPM | SYSTOLIC BLOOD PRESSURE: 123 MMHG | RESPIRATION RATE: 18 BRPM | TEMPERATURE: 98 F | OXYGEN SATURATION: 98 %

## 2024-07-01 DIAGNOSIS — H57.89 THYROID EYE DISEASE: Primary | ICD-10-CM

## 2024-07-01 DIAGNOSIS — E07.9 THYROID EYE DISEASE: Primary | ICD-10-CM

## 2024-07-01 PROCEDURE — 77387 GUIDANCE FOR RADJ TX DLVR: CPT | Performed by: RADIOLOGY

## 2024-07-01 PROCEDURE — 77412 RADIATION TX DELIVERY LVL 3: CPT | Performed by: RADIOLOGY

## 2024-07-01 RX ORDER — CHOLESTYRAMINE 4 G/9G
1 POWDER, FOR SUSPENSION ORAL DAILY
COMMUNITY
Start: 2024-04-30 | End: 2024-07-03

## 2024-07-01 ASSESSMENT — PAIN SCALES - GENERAL: PAINLEVEL: MILD PAIN (2)

## 2024-07-01 NOTE — PROGRESS NOTES
RADIATION ONCOLOGY WEEKLY TREATMENT VISIT NOTE      Assessment / Impression       Visit Dx:  (H57.89,  E07.9) Thyroid eye disease  (primary encounter diagnosis)    Tolerating radiation therapy well.  All questions and concerns addressed.    Plan:     Continue radiation treatment as prescribed.    Pain Management Plan: NA    Subjective:      HPI: Rd Hyman is a 48 year old female with  Thyroid eye disease [H57.89, E07.9]    The following portions of the patient's history were reviewed and updated as appropriate: allergies, current medications, past family history, past medical history, past social history, past surgical history and problem list.    Assessment                  Body Site:  Brain Site: BiLatOrbits  Stereotactic Radiosurgery: No  Concurrent Therapy: No  Today's Dose: 600  Today's Fraction/Total Fraction Brain: 3/10  Ocular/Visual - other : 0: Normal (wondering about eye drops)  Middle ear/hearin: Serous otitis without subjective decrease in hearing  Tinnitus: 2: Yes, chronic (greater than 3 months)  Depressed level of consciousness: 0: Normal  Oriented x of spheres: 4  Neuropathy - motor: 0: Normal  Ataxia: 0: Normal  Speech Impairment (e.g. Dysphasia or aphasia): 0: Normal  Seizures: 0: None  Urinary Incontinence: 0: None  Bowel Incontinence: 0: None  Insomnia: 0: Normal                                   Sexuality Alteration                    Emotional Alteration    Copin: Effective (feels a little more short tempered)  Comfort Alteration   KPS: 100 % Normal, no complaints  Fatigue (ONS scale): 2: Mild Fatigue  Pain Location: eyes  Pain Intensity. Rate degree of pain ranging from 0 (no pain) to 10 (severe pain): 1  Pain Description: Combination - A combination of pain descriptions (note) (tight and tender)  Pain Intervention: 0: None   Nutrition Alteration   Anorexia: 0: None  Nausea: 0: None  Vomitin: None  Weight: 63.1 kg (139 lb 1.6 oz)  Skin Alteration   Skin Sensation: 0:  No problem  Skin Reaction: 0: None  Alopecia: 0: Normal  AUA Assessment                                           Accompanied by       Objective:     Exam: no Erythema.    Vitals:    07/01/24 0903   BP: 123/76   Pulse: 68   Resp: 18   Temp: 98  F (36.7  C)   TempSrc: Oral   SpO2: 98%   Weight: 63.1 kg (139 lb 1.6 oz)   PainSc: Mild Pain (2)   PainLoc: Eye       Wt Readings from Last 8 Encounters:   07/01/24 63.1 kg (139 lb 1.6 oz)   04/30/24 58.5 kg (129 lb)   04/02/24 60.3 kg (133 lb)   03/18/24 62.1 kg (137 lb)   05/08/23 75 kg (165 lb 6.4 oz)   05/03/23 75.8 kg (167 lb)   10/17/22 77 kg (169 lb 12.8 oz)   04/19/22 77.8 kg (171 lb 9.6 oz)       General: Alert and oriented, in no acute distress  Rd has no Erythema.  Aria chart and setup information reviewed    Socorro Stuart MD

## 2024-07-01 NOTE — LETTER
2024      Rd Hyman  [address]  [facility] [address]      Dear Colleague,    Thank you for referring your patient, Rd Hyman, to the [facility] RADIATION ONCOLOGY [place]. Please see a copy of my visit note below.    RADIATION ONCOLOGY WEEKLY TREATMENT VISIT NOTE      Assessment / Impression       Visit Dx:  (H57.89,  E07.9) Thyroid eye disease  (primary encounter diagnosis)    Tolerating radiation therapy well.  All questions and concerns addressed.    Plan:     Continue radiation treatment as prescribed.    Pain Management Plan: NA    Subjective:      HPI: Rd Hyman is a 48 year old female with  Thyroid eye disease [H57.89, E07.9]    The following portions of the patient's history were reviewed and updated as appropriate: allergies, current medications, past family history, past medical history, past social history, past surgical history and problem list.    Assessment                  Body Site:  Brain Site: BiLatOrbits  Stereotactic Radiosurgery: No  Concurrent Therapy: No  Today's Dose: 600  Today's Fraction/Total Fraction Brain: 3/10  Ocular/Visual - other : 0: Normal (wondering about eye drops)  Middle ear/hearin: Serous otitis without subjective decrease in hearing  Tinnitus: 2: Yes, chronic (greater than 3 months)  Depressed level of consciousness: 0: Normal  Oriented x of spheres: 4  Neuropathy - motor: 0: Normal  Ataxia: 0: Normal  Speech Impairment (e.g. Dysphasia or aphasia): 0: Normal  Seizures: 0: None  Urinary Incontinence: 0: None  Bowel Incontinence: 0: None  Insomnia: 0: Normal                                   Sexuality Alteration                    Emotional Alteration    Copin: Effective (feels a little more short tempered)  Comfort Alteration   KPS: 100 % Normal, no complaints  Fatigue (ONS scale): 2: Mild Fatigue  Pain Location: eyes  Pain Intensity. Rate degree of pain ranging from 0 (no pain) to 10 (severe pain): 1  Pain Description:  Combination - A combination of pain descriptions (note) (tight and tender)  Pain Intervention: 0: None   Nutrition Alteration   Anorexia: 0: None  Nausea: 0: None  Vomitin: None  Weight: 63.1 kg (139 lb 1.6 oz)  Skin Alteration   Skin Sensation: 0: No problem  Skin Reaction: 0: None  Alopecia: 0: Normal  AUA Assessment                                           Accompanied by       Objective:     Exam: no Erythema.    Vitals:    24 0903   BP: 123/76   Pulse: 68   Resp: 18   Temp: 98  F (36.7  C)   TempSrc: Oral   SpO2: 98%   Weight: 63.1 kg (139 lb 1.6 oz)   PainSc: Mild Pain (2)   PainLoc: Eye       Wt Readings from Last 8 Encounters:   24 63.1 kg (139 lb 1.6 oz)   24 58.5 kg (129 lb)   24 60.3 kg (133 lb)   24 62.1 kg (137 lb)   23 75 kg (165 lb 6.4 oz)   23 75.8 kg (167 lb)   10/17/22 77 kg (169 lb 12.8 oz)   22 77.8 kg (171 lb 9.6 oz)       General: Alert and oriented, in no acute distress  Rd has no Erythema.  Aria chart and setup information reviewed    Socorro Stuart MD      Again, thank you for allowing me to participate in the care of your patient.        Sincerely,        Socorro Stuart MD

## 2024-07-02 ENCOUNTER — APPOINTMENT (OUTPATIENT)
Dept: RADIATION ONCOLOGY | Facility: HOSPITAL | Age: 49
End: 2024-07-02
Attending: RADIOLOGY
Payer: COMMERCIAL

## 2024-07-02 PROCEDURE — 77412 RADIATION TX DELIVERY LVL 3: CPT | Performed by: RADIOLOGY

## 2024-07-02 PROCEDURE — 77387 GUIDANCE FOR RADJ TX DLVR: CPT | Performed by: RADIOLOGY

## 2024-07-03 ENCOUNTER — OFFICE VISIT (OUTPATIENT)
Dept: FAMILY MEDICINE | Facility: CLINIC | Age: 49
End: 2024-07-03
Payer: COMMERCIAL

## 2024-07-03 ENCOUNTER — APPOINTMENT (OUTPATIENT)
Dept: RADIATION ONCOLOGY | Facility: HOSPITAL | Age: 49
End: 2024-07-03
Attending: RADIOLOGY
Payer: COMMERCIAL

## 2024-07-03 VITALS
BODY MASS INDEX: 26.47 KG/M2 | TEMPERATURE: 98.2 F | SYSTOLIC BLOOD PRESSURE: 112 MMHG | WEIGHT: 140.2 LBS | RESPIRATION RATE: 16 BRPM | HEART RATE: 80 BPM | OXYGEN SATURATION: 97 % | DIASTOLIC BLOOD PRESSURE: 75 MMHG | HEIGHT: 61 IN

## 2024-07-03 DIAGNOSIS — R45.4 IRRITABILITY: Primary | ICD-10-CM

## 2024-07-03 DIAGNOSIS — E05.90 THYROTOXICOSIS WITHOUT THYROID STORM, UNSPECIFIED THYROTOXICOSIS TYPE: ICD-10-CM

## 2024-07-03 DIAGNOSIS — Z12.11 SCREEN FOR COLON CANCER: ICD-10-CM

## 2024-07-03 DIAGNOSIS — L65.9 HAIR THINNING: ICD-10-CM

## 2024-07-03 DIAGNOSIS — Z12.31 ENCOUNTER FOR SCREENING MAMMOGRAM FOR BREAST CANCER: ICD-10-CM

## 2024-07-03 PROCEDURE — 77387 GUIDANCE FOR RADJ TX DLVR: CPT | Performed by: STUDENT IN AN ORGANIZED HEALTH CARE EDUCATION/TRAINING PROGRAM

## 2024-07-03 PROCEDURE — 84439 ASSAY OF FREE THYROXINE: CPT | Performed by: FAMILY MEDICINE

## 2024-07-03 PROCEDURE — 77336 RADIATION PHYSICS CONSULT: CPT | Performed by: RADIOLOGY

## 2024-07-03 PROCEDURE — 77412 RADIATION TX DELIVERY LVL 3: CPT | Performed by: STUDENT IN AN ORGANIZED HEALTH CARE EDUCATION/TRAINING PROGRAM

## 2024-07-03 PROCEDURE — 83550 IRON BINDING TEST: CPT | Performed by: FAMILY MEDICINE

## 2024-07-03 PROCEDURE — 84443 ASSAY THYROID STIM HORMONE: CPT | Performed by: FAMILY MEDICINE

## 2024-07-03 PROCEDURE — 99214 OFFICE O/P EST MOD 30 MIN: CPT | Performed by: FAMILY MEDICINE

## 2024-07-03 PROCEDURE — 36415 COLL VENOUS BLD VENIPUNCTURE: CPT | Performed by: FAMILY MEDICINE

## 2024-07-03 PROCEDURE — 82728 ASSAY OF FERRITIN: CPT | Performed by: FAMILY MEDICINE

## 2024-07-03 PROCEDURE — 77427 RADIATION TX MANAGEMENT X5: CPT | Performed by: RADIOLOGY

## 2024-07-03 PROCEDURE — 83540 ASSAY OF IRON: CPT | Performed by: FAMILY MEDICINE

## 2024-07-03 RX ORDER — SERTRALINE HYDROCHLORIDE 25 MG/1
25 TABLET, FILM COATED ORAL DAILY
Qty: 90 TABLET | Refills: 1 | Status: SHIPPED | OUTPATIENT
Start: 2024-07-03

## 2024-07-03 NOTE — PROGRESS NOTES
Assessment/ Plan     1. Irritability  Paulina has noticed more irritability and less patience, especially with her daughter.  This certainly could be side effects of her hyperthyroidism.  We discussed options today and she thinks that she would like to try a low-dose of sertraline just to see if this helps a little bit.  I discussed that can take 3 to 4 weeks to get the full effect.  It also sounds like she is getting a little bit burned out with her work life and home life and not finding good balance.  We discussed some ways that she can try to do some stress relief and self cares.  She is likely not getting enough sleep as well.  - sertraline (ZOLOFT) 25 MG tablet; Take 1 tablet (25 mg) by mouth daily  Dispense: 90 tablet; Refill: 1    2. Hair thinning  She has had quite a bit of hair thinning and does have a little bit of patches of baldness.  I think this may be multifactorial.  Certainly her thyroid issues are probably causing some problems.  Also I think she probably has some tension alopecia as she always wears her hair upper back.  Would recommend we check iron levels and that she leave her hair down whenever possible.  She can try Rogaine shampoo over-the-counter to see if that is helpful.  If no improvement over the next 3 to 4 months and symptoms continue, could refer to dermatology.  - Ferritin; Future  - Iron and iron binding capacity; Future    3. Thyrotoxicosis without thyroid storm, unspecified thyrotoxicosis type  Is following with an endocrinologist and recently started on medication.  I discussed with her a lot of her symptoms could actually be related to an overactive thyroid and hopefully they will settle out as her levels remain normal.  She did have some nail changes but actually looks like it is growing out and this may be from the treatment so discussed being patient and see how things are going over the next couple of months.    4. Screen for colon cancer  She has never had a colonoscopy.  -  Colonoscopy Screening  Referral; Future    5. Encounter for screening mammogram for breast cancer  She is overdue for her mammogram.  - MA Screen Bilateral w/David; Future      Subjective:      Rd Hyman is a 48 year old female who presents for discussion of several things.  She initially was coming in with some long COVID symptoms but they have now resolved.  She has been following with endocrinologist for her overactive thyroid.  She just recently started medication.  She has been having some hair and nail changes.  Her hair is thinning but she also has some spots of baldness.  Her nails have been really dry.  She also has had more irritability.  She feels like she has a short temper with her 10-year-old daughter and she does not like that.  It sounds like she is not doing a lot of self cares.  She works a lot and often works overnight and then comes home and gets her daughter ready for school and then cleans the house instead of sleeping.  She does not feel depressed or overall anxious.    Relevant past medical, family, surgical, and social history reviewed with patient, unless noted in HPI, not pertinent for this visit.  Medications were discussed and reconciled.   Review of Systems   A 12 point comprehensive review of systems was negative except as noted.      Current Outpatient Medications   Medication Sig Dispense Refill    Cholecalciferol (VITAMIN D-3) 25 MCG (1000 UT) CAPS       Cyanocobalamin (B-12) 1000 MCG TBCR Take 1 tablet by mouth daily      ibuprofen (ADVIL/MOTRIN) 600 MG tablet Take 1 tablet (600 mg) by mouth every 8 hours as needed for moderate pain 30 tablet 0    loratadine (CLARITIN) 10 MG tablet Take 10 mg by mouth daily      methimazole (TAPAZOLE) 10 MG tablet Take 1 tablet (10 mg) by mouth 2 times daily for 60 days 120 tablet 0    multivitamin w/minerals (THERA-VIT-M) tablet Take by mouth daily      sertraline (ZOLOFT) 25 MG tablet Take 1 tablet (25 mg) by mouth daily 90 tablet 1  "        Objective:     /75   Pulse 80   Temp 98.2  F (36.8  C)   Resp 16   Ht 1.549 m (5' 1\")   Wt 63.6 kg (140 lb 3.2 oz)   LMP  (LMP Unknown)   SpO2 97%   BMI 26.49 kg/m      Body mass index is 26.49 kg/m .       General appearance: alert, appears stated age and cooperative    Lungs: clear to auscultation bilaterally  Heart: regular rate and rhythm, S1, S2 normal, no murmur, click, rub or gallop     Hair: She overall does have some thinning but she has on the top and the crown of her head some very small bald patches.    No results found for this or any previous visit (from the past 168 hour(s)).       This note has been dictated using voice recognition software. Any grammatical or context distortions are unintentional and inherent to the software  "

## 2024-07-04 LAB
FERRITIN SERPL-MCNC: 159 NG/ML (ref 6–175)
IRON BINDING CAPACITY (ROCHE): 247 UG/DL (ref 240–430)
IRON SATN MFR SERPL: 46 % (ref 15–46)
IRON SERPL-MCNC: 113 UG/DL (ref 37–145)
T4 FREE SERPL-MCNC: 0.49 NG/DL (ref 0.9–1.7)
TSH SERPL DL<=0.005 MIU/L-ACNC: <0.01 UIU/ML (ref 0.3–4.2)

## 2024-07-05 ENCOUNTER — APPOINTMENT (OUTPATIENT)
Dept: RADIATION ONCOLOGY | Facility: HOSPITAL | Age: 49
End: 2024-07-05
Attending: RADIOLOGY
Payer: COMMERCIAL

## 2024-07-05 DIAGNOSIS — E05.90 THYROTOXICOSIS WITHOUT THYROID STORM, UNSPECIFIED THYROTOXICOSIS TYPE: ICD-10-CM

## 2024-07-05 PROCEDURE — 77412 RADIATION TX DELIVERY LVL 3: CPT | Performed by: RADIOLOGY

## 2024-07-05 PROCEDURE — 77387 GUIDANCE FOR RADJ TX DLVR: CPT | Performed by: RADIOLOGY

## 2024-07-05 RX ORDER — METHIMAZOLE 10 MG/1
10 TABLET ORAL DAILY
Qty: 60 TABLET | Refills: 0 | Status: SHIPPED | OUTPATIENT
Start: 2024-07-05 | End: 2024-08-21

## 2024-07-05 NOTE — RESULT ENCOUNTER NOTE
Hello -    Here are my comments about the recent results: as we talked on the phone T4 is now low. We can decrease methimazole from 10 mg twice/day to 10 mg daily. For feeling neck enlargement, will get a follow up US.    Regards,   Danilo Ramirez MD

## 2024-07-08 ENCOUNTER — OFFICE VISIT (OUTPATIENT)
Dept: RADIATION ONCOLOGY | Facility: HOSPITAL | Age: 49
End: 2024-07-08
Attending: RADIOLOGY
Payer: COMMERCIAL

## 2024-07-08 VITALS
BODY MASS INDEX: 26.6 KG/M2 | WEIGHT: 140.8 LBS | SYSTOLIC BLOOD PRESSURE: 119 MMHG | TEMPERATURE: 98.5 F | OXYGEN SATURATION: 96 % | DIASTOLIC BLOOD PRESSURE: 77 MMHG | HEART RATE: 74 BPM | RESPIRATION RATE: 18 BRPM

## 2024-07-08 DIAGNOSIS — E07.9 THYROID EYE DISEASE: Primary | ICD-10-CM

## 2024-07-08 DIAGNOSIS — H57.89 THYROID EYE DISEASE: Primary | ICD-10-CM

## 2024-07-08 PROCEDURE — 77412 RADIATION TX DELIVERY LVL 3: CPT | Performed by: RADIOLOGY

## 2024-07-08 PROCEDURE — 77387 GUIDANCE FOR RADJ TX DLVR: CPT | Performed by: RADIOLOGY

## 2024-07-08 ASSESSMENT — PAIN SCALES - GENERAL: PAINLEVEL: NO PAIN (1)

## 2024-07-08 NOTE — LETTER
2024      Rd Hyman  [address]  [facility] [address]      Dear Colleague,    Thank you for referring your patient, Rd Hyman, to the [facility] RADIATION ONCOLOGY [place]. Please see a copy of my visit note below.    RADIATION ONCOLOGY WEEKLY TREATMENT VISIT NOTE      Assessment / Impression       Visit Dx:  (H57.89,  E07.9) Thyroid eye disease  (primary encounter diagnosis)     Tolerating radiation therapy well.  All questions and concerns addressed.    Plan:     Continue radiation treatment as prescribed.    Patient experienced some sinus pressure over the past few days.  Recommend patient to take sinus medication for symptom control.    Pain Management Plan: NA    Subjective:      HPI: Rd Hyman is a 48 year old female with  Thyroid eye disease [H57.89, E07.9]    The following portions of the patient's history were reviewed and updated as appropriate: allergies, current medications, past family history, past medical history, past social history, past surgical history and problem list.    Assessment                  Body Site:  Brain Site: BiLatOrbits  Stereotactic Radiosurgery: No  Concurrent Therapy: No  Today's Dose: 1400  Today's Fraction/Total Fraction Brain: 7/10  Ocular/Visual - other : 0: Normal  Middle ear/hearin: Serous otitis without subjective decrease in hearing (little noise in both ears)  Tinnitus: 2: Yes, chronic (greater than 3 months)  Depressed level of consciousness: 0: Normal  Oriented x of spheres: x4  Ataxia: 0: Normal  Speech Impairment (e.g. Dysphasia or aphasia): 0: Normal                                   Sexuality Alteration                    Emotional Alteration    Copin: Effective (Newly started on Sertraline for short temperdness)  Comfort Alteration   KPS: 100 % Normal, no complaints  Fatigue (ONS scale): 2: Mild Fatigue  Pain Location: eyes  Pain Intensity. Rate degree of pain ranging from 0 (no pain) to 10 (severe pain): 1  Pain  Description: Combination - A combination of pain descriptions (note) (pressure)  Pain Intervention: 1: Over the counter medications (tylenol and ibuprofen)   Nutrition Alteration   Anorexia: 0: None  Nausea: 0: None  Vomitin: None  Weight: 63.9 kg (140 lb 12.8 oz)  Pharynx and Esphogaus: 0: No change over baseline  Skin Alteration   Skin Sensation: 0: No problem  Skin Reaction: 0: None  AUA Assessment                                           Accompanied by       Objective:     Exam: no Erythema.    Vitals:    24 0902   BP: 119/77   Pulse: 74   Resp: 18   Temp: 98.5  F (36.9  C)   TempSrc: Oral   SpO2: 96%   Weight: 63.9 kg (140 lb 12.8 oz)   PainSc: No Pain (1)   PainLoc: Eye       Wt Readings from Last 8 Encounters:   24 63.9 kg (140 lb 12.8 oz)   24 63.6 kg (140 lb 3.2 oz)   24 63.1 kg (139 lb 1.6 oz)   24 58.5 kg (129 lb)   24 60.3 kg (133 lb)   24 62.1 kg (137 lb)   23 75 kg (165 lb 6.4 oz)   23 75.8 kg (167 lb)       General: Alert and oriented, in no acute distress  Rd has no Erythema.  Aria chart and setup information reviewed    Socorro Stuart MD      Again, thank you for allowing me to participate in the care of your patient.        Sincerely,        Socorro Stuart MD

## 2024-07-08 NOTE — PROGRESS NOTES
RADIATION ONCOLOGY WEEKLY TREATMENT VISIT NOTE      Assessment / Impression       Visit Dx:  (H57.89,  E07.9) Thyroid eye disease  (primary encounter diagnosis)     Tolerating radiation therapy well.  All questions and concerns addressed.    Plan:     Continue radiation treatment as prescribed.    Patient experienced some sinus pressure over the past few days.  Recommend patient to take sinus medication for symptom control.    Pain Management Plan: NA    Subjective:      HPI: Rd Hyman is a 48 year old female with  Thyroid eye disease [H57.89, E07.9]    The following portions of the patient's history were reviewed and updated as appropriate: allergies, current medications, past family history, past medical history, past social history, past surgical history and problem list.    Assessment                  Body Site:  Brain Site: BiLatOrbits  Stereotactic Radiosurgery: No  Concurrent Therapy: No  Today's Dose: 1400  Today's Fraction/Total Fraction Brain: 7/10  Ocular/Visual - other : 0: Normal  Middle ear/hearin: Serous otitis without subjective decrease in hearing (little noise in both ears)  Tinnitus: 2: Yes, chronic (greater than 3 months)  Depressed level of consciousness: 0: Normal  Oriented x of spheres: x4  Ataxia: 0: Normal  Speech Impairment (e.g. Dysphasia or aphasia): 0: Normal                                   Sexuality Alteration                    Emotional Alteration    Copin: Effective (Newly started on Sertraline for short temperdness)  Comfort Alteration   KPS: 100 % Normal, no complaints  Fatigue (ONS scale): 2: Mild Fatigue  Pain Location: eyes  Pain Intensity. Rate degree of pain ranging from 0 (no pain) to 10 (severe pain): 1  Pain Description: Combination - A combination of pain descriptions (note) (pressure)  Pain Intervention: 1: Over the counter medications (tylenol and ibuprofen)   Nutrition Alteration   Anorexia: 0: None  Nausea: 0: None  Vomitin: None  Weight:  63.9 kg (140 lb 12.8 oz)  Pharynx and Esphogaus: 0: No change over baseline  Skin Alteration   Skin Sensation: 0: No problem  Skin Reaction: 0: None  AUA Assessment                                           Accompanied by       Objective:     Exam: no Erythema.    Vitals:    07/08/24 0902   BP: 119/77   Pulse: 74   Resp: 18   Temp: 98.5  F (36.9  C)   TempSrc: Oral   SpO2: 96%   Weight: 63.9 kg (140 lb 12.8 oz)   PainSc: No Pain (1)   PainLoc: Eye       Wt Readings from Last 8 Encounters:   07/08/24 63.9 kg (140 lb 12.8 oz)   07/03/24 63.6 kg (140 lb 3.2 oz)   07/01/24 63.1 kg (139 lb 1.6 oz)   04/30/24 58.5 kg (129 lb)   04/02/24 60.3 kg (133 lb)   03/18/24 62.1 kg (137 lb)   05/08/23 75 kg (165 lb 6.4 oz)   05/03/23 75.8 kg (167 lb)       General: Alert and oriented, in no acute distress  Jose Gyotimothy has no Erythema.  Aria chart and setup information reviewed    Socorro Stuart MD

## 2024-07-09 ENCOUNTER — APPOINTMENT (OUTPATIENT)
Dept: RADIATION ONCOLOGY | Facility: HOSPITAL | Age: 49
End: 2024-07-09
Attending: RADIOLOGY
Payer: COMMERCIAL

## 2024-07-09 PROCEDURE — 77412 RADIATION TX DELIVERY LVL 3: CPT | Performed by: RADIOLOGY

## 2024-07-09 PROCEDURE — 77387 GUIDANCE FOR RADJ TX DLVR: CPT | Performed by: RADIOLOGY

## 2024-07-10 ENCOUNTER — APPOINTMENT (OUTPATIENT)
Dept: RADIATION ONCOLOGY | Facility: HOSPITAL | Age: 49
End: 2024-07-10
Attending: RADIOLOGY
Payer: COMMERCIAL

## 2024-07-10 PROCEDURE — 77387 GUIDANCE FOR RADJ TX DLVR: CPT | Performed by: STUDENT IN AN ORGANIZED HEALTH CARE EDUCATION/TRAINING PROGRAM

## 2024-07-10 PROCEDURE — 77412 RADIATION TX DELIVERY LVL 3: CPT | Performed by: STUDENT IN AN ORGANIZED HEALTH CARE EDUCATION/TRAINING PROGRAM

## 2024-07-11 ENCOUNTER — ALLIED HEALTH/NURSE VISIT (OUTPATIENT)
Dept: RADIATION ONCOLOGY | Facility: HOSPITAL | Age: 49
End: 2024-07-11
Attending: RADIOLOGY
Payer: COMMERCIAL

## 2024-07-11 PROCEDURE — 77387 GUIDANCE FOR RADJ TX DLVR: CPT | Performed by: RADIOLOGY

## 2024-07-11 PROCEDURE — 77412 RADIATION TX DELIVERY LVL 3: CPT | Performed by: RADIOLOGY

## 2024-07-11 PROCEDURE — 77427 RADIATION TX MANAGEMENT X5: CPT | Performed by: RADIOLOGY

## 2024-07-11 PROCEDURE — 77336 RADIATION PHYSICS CONSULT: CPT | Performed by: RADIOLOGY

## 2024-07-11 NOTE — PROGRESS NOTES
Radiation Treatment Summary          Patient: Rd Hyman            MRN: [id number]           : 1975        Care Provider: Socorro Stuart MD         Date of Service: 2024      Dax Delarosa MD  [address] [place] [id number]           Dear Dr. Delarosa:     Your patient Mrs. Paulina Hyman completed her radiation therapy on 2022. As you know Ms. 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  The patient received radiation therapy to her bilateral eyes with a total dose of 2000 cGy in 10 treatments given from 2024 - 2024.  The patient tolerated radiation therapy well with minimal side effect.  She is scheduled to return to radiation oncology in 4 weeks for a routine post therapy office follow-up.    Again, thank you very much for the referral and allowing me to participate in the care of this patient.  If you have any questions or concerns about this patient, please do not hesitate to call.      Pain Management Plan: NA          Sincerely,    Socorro Stuart MD, PhD  Department of Radiation Oncology   [facility] Radiation Oncology  Cell: [phone]    [facility]  [address] [place] [address]     [address]   [place] [address]      CC:  Patient Care Team:  Kellie Torres MD as PCP - General (Family Medicine)  Kellie Torres MD Brown, Neil E, MD as MD (Otolaryngology)  Shiela Izquierdo NP as Nurse Practitioner  Markus Dietz PA-C as Assigned PCP  Danilo Ramirez MD as Physician (Endocrinology, Diabetes, and Metabolism)  Dax Blakely MD as MD (Dermatology)  Danilo Ramirez MD as Assigned Endocrinology Provider  Socorro Stuart MD as MD (Radiation Oncology)  Socorro Stuart MD as Assigned Cancer Care Provider

## 2024-07-11 NOTE — PROGRESS NOTES
Pt ambulatory to radiation clinic for last tx. Discharge instructions given verbally and in writing. Informed to call with any questions or concerns. Follow up to be made on discharge from department.

## 2024-07-12 ENCOUNTER — HOSPITAL ENCOUNTER (OUTPATIENT)
Dept: ULTRASOUND IMAGING | Facility: HOSPITAL | Age: 49
Discharge: HOME | End: 2024-07-12
Attending: INTERNAL MEDICINE | Admitting: INTERNAL MEDICINE
Payer: COMMERCIAL

## 2024-07-12 DIAGNOSIS — E05.90 THYROTOXICOSIS WITHOUT THYROID STORM, UNSPECIFIED THYROTOXICOSIS TYPE: ICD-10-CM

## 2024-07-12 PROCEDURE — 76536 US EXAM OF HEAD AND NECK: CPT

## 2024-07-16 ENCOUNTER — VIRTUAL VISIT (OUTPATIENT)
Dept: ENDOCRINOLOGY | Facility: CLINIC | Age: 49
End: 2024-07-16
Payer: COMMERCIAL

## 2024-07-16 DIAGNOSIS — E05.90 THYROTOXICOSIS WITHOUT THYROID STORM, UNSPECIFIED THYROTOXICOSIS TYPE: Primary | ICD-10-CM

## 2024-07-16 PROCEDURE — 99214 OFFICE O/P EST MOD 30 MIN: CPT | Mod: 95 | Performed by: INTERNAL MEDICINE

## 2024-07-16 PROCEDURE — G2211 COMPLEX E/M VISIT ADD ON: HCPCS | Mod: 95 | Performed by: INTERNAL MEDICINE

## 2024-07-16 NOTE — PROGRESS NOTES
07/16/24  Routed Radiation Tx Summary to Dr. Dax Delarosa Opt, and pt's PCP.     Radha Alexandra RN  Rad Onc [place]

## 2024-07-16 NOTE — LETTER
7/16/2024      Rd Hyman  [address]  [place] [address]      Dear Colleague,    Thank you for referring your patient, Rd Hyman, to the [facility]IC [place]. Please see a copy of my visit note below.    Video-Visit Details    Type of service:  Video Visit  Video Start Time: 0840  Video End Time: 0900  Originating Location (pt. Location): Home, [place]  Distant Location (provider location):  Home  Platform used for Video Visit: Marjorie Ramirez MD      HISTORY OF PRESENT ILLNESS  Rd Hyman is a 48 year old female with  who is here for follow up.    Interval History  Decrease MMI to 10 mg 7/5/2024  Done with eye treatment  Eye swelling improve, no problem with pain/ double vision  Appetite is too good  Pt went to the ED and used cream on the legs and back-->improved  Since her rash significantly improve and pt cancelled dermatologist      Initial visit  Patient initially seen by Dr. Zayas and then by me.    She was diagnosed with COVID19 in 12/2023. After this she started to develop weight loss, her weight at that time was 168 lbs and now she is weighing 129 lbs. She feels colder than usual. She has been experiencing tremors, palpitations associated with shortness of breath. She also has noticed weakness most notable when she is climbing stairs. She has not noticed neck swelling..     Her eyelids have been swollen and with eye pain. No blurry vision or diplopia.     Since onset symptoms have been persistent.     Last month she presented to ER for evaluation of flank pain. Found to have a kidney stone. Recently her serum calcium was 10.8. She has no prior history of hypercalcemia.     REVIEW OF SYSTEMS  10 point negative except as mentioned in HPI    Past Medical/Surgical History:  Past Medical History:   Diagnosis Date     Graves disease      Infertility      Thyroid eye disease      Past Surgical History:   Procedure Laterality Date     DILATION AND CURETTAGE  06/10/2014      DILATION AND CURETTAGE N/A 6/10/2014    Procedure: DILATION AND CURETTAGE;  Surgeon: Toni Shepard MD;  Location: [facility] OR;  Service:        Medications  Current Outpatient Medications   Medication Sig Dispense Refill     Cholecalciferol (VITAMIN D-3) 25 MCG (1000 UT) CAPS        Cyanocobalamin (B-12) 1000 MCG TBCR Take 1 tablet by mouth daily       ibuprofen (ADVIL/MOTRIN) 600 MG tablet Take 1 tablet (600 mg) by mouth every 8 hours as needed for moderate pain 30 tablet 0     loratadine (CLARITIN) 10 MG tablet Take 10 mg by mouth daily       methimazole (TAPAZOLE) 10 MG tablet Take 1 tablet (10 mg) by mouth daily for 60 days 60 tablet 0     multivitamin w/minerals (THERA-VIT-M) tablet Take by mouth daily       sertraline (ZOLOFT) 25 MG tablet Take 1 tablet (25 mg) by mouth daily 90 tablet 1     No current facility-administered medications for this visit.       Allergies  Allergies   Allergen Reactions     Cat Hair Extract Other (See Comments)     Dust Mites          Family History  family history is not on file.    Social History  Social History     Tobacco Use     Smoking status: Never     Passive exposure: Current ( smokes outside)     Smokeless tobacco: Never   Substance Use Topics     Alcohol use: Not Currently       Physical Exam  LMP  (LMP Unknown)   There is no height or weight on file to calculate BMI.  GENERAL :  In no apparent distress  EYES: No proptosis, some periorbital swelling, small left eye hemorrhage  RESP: Normal breathing  NEURO: awake, alert, responds appropriately to questions.      DATA REVIEW  Labs/Imaging  Lab Results   Component Value Date    TSH <0.01 07/03/2024      Free T4   Date Value Ref Range Status   07/03/2024 0.49 (L) 0.90 - 1.70 ng/dL Final      Thyrotropin Receptor Antibody   Date Value Ref Range Status   05/21/2024 15 (H) 0.00 - 1.75 IU/L Final     Comment:        -------------------ADDITIONAL INFORMATION-------------------  At a decision limit of 1.75  IU/L, this assay   has 97% sensitivity and 99% specificity for   detection of Graves' disease. In healthy   individuals and in patients with thyroid disease   without diagnosis of Graves' disease, the upper   limit of anti-TSHR values are 1.22 IU/L and   1.58 IU/L, respectively (97.5th percentiles).     Test Performed by:  [facility]  [address] [place] [address]  : Quincy Crandall M.D. Ph.D.; CLIA# [id number]     AST   Date Value Ref Range Status   04/02/2024 36 0 - 45 U/L Final     Comment:     Reference intervals for this test were updated on 6/12/2023 to more accurately reflect our healthy population. There may be differences in the flagging of prior results with similar values performed with this method. Interpretation of those prior results can be made in the context of the updated reference intervals.     ALT   Date Value Ref Range Status   04/02/2024 34 0 - 50 U/L Final     Comment:     Reference intervals for this test were updated on 6/12/2023 to more accurately reflect our healthy population. There may be differences in the flagging of prior results with similar values performed with this method. Interpretation of those prior results can be made in the context of the updated reference intervals.       Alkaline Phosphatase   Date Value Ref Range Status   04/02/2024 145 40 - 150 U/L Final     Comment:     Reference intervals for this test were updated on 11/14/2023 to more accurately reflect our healthy population. There may be differences in the flagging of prior results with similar values performed with this method. Interpretation of those prior results can be made in the context of the updated reference intervals.     WBC Count   Date Value Ref Range Status   04/02/2024 9.4 4.0 - 11.0 10e3/uL Final     EXAM: US THYROID  LOCATION: [facility]  DATE: 7/12/2024     INDICATION: worsening neck  enlargement  COMPARISON: Thyroid ultrasound 3/25/2024  TECHNIQUE: Thyroid ultrasound.      FINDINGS:  RIGHT lobe: 6 x 2.4 x 3.4 cm, compared to 6.4 x 2.2 x 2.8 cm Homogeneous echotexture.   Isthmus: 7 mm.  LEFT lobe: 6.5 x 2.6 x 3.3 cm, compared to 6.7 x 2.4 x 2.7. Homogeneous echotexture. Several small parenchymal calcifications are present mid to low left thyroid lobe measuring up to 6 mm in greatest dimension.     NECK: No cervical lymphadenopathy.     NODULES:     Nodule 1: Coarse calcified nodule right upper lobe measures 7 x 8 x 7 mm, unchanged.   Composition: Solid or almost completely solid, 2 points   Echogenicity: Unable to determine, 1 point   Shape: Wider-than-tall, 0 points   Margin: Smooth, 0 points   Echogenic Foci: Macrocalcifications, 1 point   Point Total: 4-6 points. TI-RADS 4. If 1.5 cm or larger, recommend FNA; if 1 cm or larger, follow up US (annually for 5 years).      Nodule 2: Solid, hyperechoic nodule superior left thyroid measures 12 x 11 x 11 mm. In retrospect this nodule was present on the prior ultrasound and is unchanged.  Composition: Solid or almost completely solid, 2 points   Echogenicity: Hyperechoic or isoechoic, 1 point   Shape: Not taller than wide, 0 points   Margin: Smooth, 0 points   Echogenic Foci: None, or large comet-tail artifacts, 0 points   Point Total: 3 points. TI-RADS 3. If 2.5 cm or larger, recommend FNA; if 1.5 cm or larger, recommend follow up US at 1, 3, and 5 years.                                                                      IMPRESSION:     Unchanged densely calcified right thyroid nodule and circumscribed hyperechoic left thyroid nodule. These are overall benign-appearing. No specific additional imaging workup or follow-up is suggested per TI RADS guidelines.    ASSESSMENT/PLAN:   ## Graves' disease, start MMI 5/23/2024  ## Graves' eye disease. Follow-up ophthalmology  ## H/o kidney stone  ## Rashes: improve  Overall feeling better  -- Continue MMI 10  mg and recheck labs in 4 weeks. Pt will let me know if any side effects.      Follow-up 3 month  Information for patients on Tapazole or Propylthiouracil (PTU)  The generic name for Tapazole is methimazole.      The drugs, Tapazole and PTU are used to treat an overactive thyroid (hyperthyroidism).      They prevent the thyroid from making too much thyroid hormone.  You can think of them as putting up a road block in your thyroid.    These drugs are usually well tolerated and safe, but rarely can cause serious side effects.  The two worst potential side-effects are:  agranulocytosis.  This is the loss of the white blood cells that fight infection.  This can occur in approximately 1 in 200 people.  liver problems.  This is even more rare than agranulocytosis but it can be very serious.    Because of the serious nature of the rare side-effects, you should notify your doctor if you develop the following symptoms while taking the drug:  Fever  sore throat  flu-like symptoms (nausea, vomiting, diarrhea, aches, abdominal pain)    In addition, anytime you have evidence of infection, you should get a blood test to be sure that you do not have agranulocytosis.  A prescription will be provided to you to get this blood test as needed.  This is an extra precaution and the results should be available the same day the blood test is drawn.  If your blood count is OK (which it almost always is), then you may continue to take the antithyroid drug.    While taking this medication, your doctor will probably want to see you frequently, every 1-2 months, at least for a time.  This is important so that the response can be monitored and the dose can be adjusted.      If you have concerns you may reach us at [phone] during regular hours, and [phone] (ask for endocrinologist on call) after hours.      Orders Placed This Encounter   Procedures     TSH with free T4 reflex     The longitudinal plan of care for the  diagnosis(es)/condition(s) as documented were addressed during this visit. Due to the added complexity in care, I will continue to support Paulina in the subsequent management and with ongoing continuity of care.        Again, thank you for allowing me to participate in the care of your patient.        Sincerely,        Danilo Ramirez MD   right

## 2024-07-16 NOTE — NURSING NOTE
Current patient location: [place]    Is the patient currently in the state of [place]? YES    Visit mode:VIDEO    If the visit is dropped, the patient can be reconnected by: VIDEO VISIT: Send to e-mail at: [email or web address]    Will anyone else be joining the visit? NO  (If patient encounters technical issues they should call [phone]    How would you like to obtain your AVS? MyChart    Are changes needed to the allergy or medication list? No    Paulina reported no changes to e-check in information for visit. VF did not review e-check in information again with Paulina due to this.     Are refills needed on medications prescribed by this physician? NO    Reason for visit: RECHECK    Caryl MIKE

## 2024-07-16 NOTE — PROGRESS NOTES
Video-Visit Details    Type of service:  Video Visit  Video Start Time: 0840  Video End Time: 0900  Originating Location (pt. Location): Home, [place]  Distant Location (provider location):  Home  Platform used for Video Visit: Marjorie Ramirez MD      HISTORY OF PRESENT ILLNESS  Rd Hyman is a 48 year old female with  who is here for follow up.    Interval History  Decrease MMI to 10 mg 7/5/2024  Done with eye treatment  Eye swelling improve, no problem with pain/ double vision  Appetite is too good  Pt went to the ED and used cream on the legs and back-->improved  Since her rash significantly improve and pt cancelled dermatologist      Initial visit  Patient initially seen by Dr. Zayas and then by me.    She was diagnosed with COVID19 in 12/2023. After this she started to develop weight loss, her weight at that time was 168 lbs and now she is weighing 129 lbs. She feels colder than usual. She has been experiencing tremors, palpitations associated with shortness of breath. She also has noticed weakness most notable when she is climbing stairs. She has not noticed neck swelling..     Her eyelids have been swollen and with eye pain. No blurry vision or diplopia.     Since onset symptoms have been persistent.     Last month she presented to ER for evaluation of flank pain. Found to have a kidney stone. Recently her serum calcium was 10.8. She has no prior history of hypercalcemia.     REVIEW OF SYSTEMS  10 point negative except as mentioned in HPI    Past Medical/Surgical History:  Past Medical History:   Diagnosis Date    Graves disease     Infertility     Thyroid eye disease      Past Surgical History:   Procedure Laterality Date    DILATION AND CURETTAGE  06/10/2014    DILATION AND CURETTAGE N/A 6/10/2014    Procedure: DILATION AND CURETTAGE;  Surgeon: Toni Shepard MD;  Location: [facility] OR;  Service:        Medications  Current Outpatient Medications   Medication Sig Dispense Refill     Cholecalciferol (VITAMIN D-3) 25 MCG (1000 UT) CAPS       Cyanocobalamin (B-12) 1000 MCG TBCR Take 1 tablet by mouth daily      ibuprofen (ADVIL/MOTRIN) 600 MG tablet Take 1 tablet (600 mg) by mouth every 8 hours as needed for moderate pain 30 tablet 0    loratadine (CLARITIN) 10 MG tablet Take 10 mg by mouth daily      methimazole (TAPAZOLE) 10 MG tablet Take 1 tablet (10 mg) by mouth daily for 60 days 60 tablet 0    multivitamin w/minerals (THERA-VIT-M) tablet Take by mouth daily      sertraline (ZOLOFT) 25 MG tablet Take 1 tablet (25 mg) by mouth daily 90 tablet 1     No current facility-administered medications for this visit.       Allergies  Allergies   Allergen Reactions    Cat Hair Extract Other (See Comments)    Dust Mites          Family History  family history is not on file.    Social History  Social History     Tobacco Use    Smoking status: Never     Passive exposure: Current ( smokes outside)    Smokeless tobacco: Never   Substance Use Topics    Alcohol use: Not Currently       Physical Exam  LMP  (LMP Unknown)   There is no height or weight on file to calculate BMI.  GENERAL :  In no apparent distress  EYES: No proptosis, some periorbital swelling, small left eye hemorrhage  RESP: Normal breathing  NEURO: awake, alert, responds appropriately to questions.      DATA REVIEW  Labs/Imaging  Lab Results   Component Value Date    TSH <0.01 07/03/2024      Free T4   Date Value Ref Range Status   07/03/2024 0.49 (L) 0.90 - 1.70 ng/dL Final      Thyrotropin Receptor Antibody   Date Value Ref Range Status   05/21/2024 15 (H) 0.00 - 1.75 IU/L Final     Comment:        -------------------ADDITIONAL INFORMATION-------------------  At a decision limit of 1.75 IU/L, this assay   has 97% sensitivity and 99% specificity for   detection of Graves' disease. In healthy   individuals and in patients with thyroid disease   without diagnosis of Graves' disease, the upper   limit of anti-TSHR values are 1.22 IU/L and    1.58 IU/L, respectively (97.5th percentiles).     Test Performed by:  [facility]  [address] [place] [address]  : Quincy Crandall M.D. Ph.D.; CLIA# [id number]     AST   Date Value Ref Range Status   04/02/2024 36 0 - 45 U/L Final     Comment:     Reference intervals for this test were updated on 6/12/2023 to more accurately reflect our healthy population. There may be differences in the flagging of prior results with similar values performed with this method. Interpretation of those prior results can be made in the context of the updated reference intervals.     ALT   Date Value Ref Range Status   04/02/2024 34 0 - 50 U/L Final     Comment:     Reference intervals for this test were updated on 6/12/2023 to more accurately reflect our healthy population. There may be differences in the flagging of prior results with similar values performed with this method. Interpretation of those prior results can be made in the context of the updated reference intervals.       Alkaline Phosphatase   Date Value Ref Range Status   04/02/2024 145 40 - 150 U/L Final     Comment:     Reference intervals for this test were updated on 11/14/2023 to more accurately reflect our healthy population. There may be differences in the flagging of prior results with similar values performed with this method. Interpretation of those prior results can be made in the context of the updated reference intervals.     WBC Count   Date Value Ref Range Status   04/02/2024 9.4 4.0 - 11.0 10e3/uL Final     EXAM: US THYROID  LOCATION: [facility]  DATE: 7/12/2024     INDICATION: worsening neck enlargement  COMPARISON: Thyroid ultrasound 3/25/2024  TECHNIQUE: Thyroid ultrasound.      FINDINGS:  RIGHT lobe: 6 x 2.4 x 3.4 cm, compared to 6.4 x 2.2 x 2.8 cm Homogeneous echotexture.   Isthmus: 7 mm.  LEFT lobe: 6.5 x 2.6 x 3.3 cm, compared to 6.7 x 2.4 x 2.7.  Homogeneous echotexture. Several small parenchymal calcifications are present mid to low left thyroid lobe measuring up to 6 mm in greatest dimension.     NECK: No cervical lymphadenopathy.     NODULES:     Nodule 1: Coarse calcified nodule right upper lobe measures 7 x 8 x 7 mm, unchanged.   Composition: Solid or almost completely solid, 2 points   Echogenicity: Unable to determine, 1 point   Shape: Wider-than-tall, 0 points   Margin: Smooth, 0 points   Echogenic Foci: Macrocalcifications, 1 point   Point Total: 4-6 points. TI-RADS 4. If 1.5 cm or larger, recommend FNA; if 1 cm or larger, follow up US (annually for 5 years).      Nodule 2: Solid, hyperechoic nodule superior left thyroid measures 12 x 11 x 11 mm. In retrospect this nodule was present on the prior ultrasound and is unchanged.  Composition: Solid or almost completely solid, 2 points   Echogenicity: Hyperechoic or isoechoic, 1 point   Shape: Not taller than wide, 0 points   Margin: Smooth, 0 points   Echogenic Foci: None, or large comet-tail artifacts, 0 points   Point Total: 3 points. TI-RADS 3. If 2.5 cm or larger, recommend FNA; if 1.5 cm or larger, recommend follow up US at 1, 3, and 5 years.                                                                      IMPRESSION:     Unchanged densely calcified right thyroid nodule and circumscribed hyperechoic left thyroid nodule. These are overall benign-appearing. No specific additional imaging workup or follow-up is suggested per TI RADS guidelines.    ASSESSMENT/PLAN:   ## Graves' disease, start MMI 5/23/2024  ## Graves' eye disease. Follow-up ophthalmology  ## H/o kidney stone  ## Rashes: improve  ## Thyroid nodules, US 7/2024  Overall feeling better  Thyroid nodules do not meet criteria for FNA, symptoms improve  -- Continue MMI 10 mg and recheck labs in 4 weeks. Pt will let me know if any side effects.  -- Follow-up US 7/2025      Follow-up 3 month  Information for patients on Tapazole or  Propylthiouracil (PTU)  The generic name for Tapazole is methimazole.      The drugs, Tapazole and PTU are used to treat an overactive thyroid (hyperthyroidism).      They prevent the thyroid from making too much thyroid hormone.  You can think of them as putting up a road block in your thyroid.    These drugs are usually well tolerated and safe, but rarely can cause serious side effects.  The two worst potential side-effects are:  agranulocytosis.  This is the loss of the white blood cells that fight infection.  This can occur in approximately 1 in 200 people.  liver problems.  This is even more rare than agranulocytosis but it can be very serious.    Because of the serious nature of the rare side-effects, you should notify your doctor if you develop the following symptoms while taking the drug:  Fever  sore throat  flu-like symptoms (nausea, vomiting, diarrhea, aches, abdominal pain)    In addition, anytime you have evidence of infection, you should get a blood test to be sure that you do not have agranulocytosis.  A prescription will be provided to you to get this blood test as needed.  This is an extra precaution and the results should be available the same day the blood test is drawn.  If your blood count is OK (which it almost always is), then you may continue to take the antithyroid drug.    While taking this medication, your doctor will probably want to see you frequently, every 1-2 months, at least for a time.  This is important so that the response can be monitored and the dose can be adjusted.      If you have concerns you may reach us at [phone] during regular hours, and [phone] (ask for endocrinologist on call) after hours.      Orders Placed This Encounter   Procedures    TSH with free T4 reflex     The longitudinal plan of care for the diagnosis(es)/condition(s) as documented were addressed during this visit. Due to the added complexity in care, I will continue to support Paulina in the  subsequent management and with ongoing continuity of care.

## 2024-07-19 ENCOUNTER — TELEPHONE (OUTPATIENT)
Dept: RADIATION ONCOLOGY | Facility: CLINIC | Age: 49
End: 2024-07-19
Payer: COMMERCIAL

## 2024-07-19 NOTE — TELEPHONE ENCOUNTER
I phoned Paulina and had to leave a message that this is a courtesy call to check on how her recovery is going. I left a return call number if she has needs. Otherwise I said we would plan to see her at her 4-6 wk follow up office visit. ERIC Doyle RN, OCN, CBCN

## 2024-07-20 ENCOUNTER — HEALTH MAINTENANCE LETTER (OUTPATIENT)
Age: 49
End: 2024-07-20

## 2024-07-25 ENCOUNTER — ANCILLARY PROCEDURE (OUTPATIENT)
Dept: MAMMOGRAPHY | Facility: CLINIC | Age: 49
End: 2024-07-25
Attending: FAMILY MEDICINE
Payer: COMMERCIAL

## 2024-07-25 DIAGNOSIS — Z12.31 ENCOUNTER FOR SCREENING MAMMOGRAM FOR BREAST CANCER: ICD-10-CM

## 2024-07-25 PROCEDURE — 77063 BREAST TOMOSYNTHESIS BI: CPT

## 2024-08-13 ENCOUNTER — OFFICE VISIT (OUTPATIENT)
Dept: RADIATION ONCOLOGY | Facility: HOSPITAL | Age: 49
End: 2024-08-13
Attending: RADIOLOGY
Payer: COMMERCIAL

## 2024-08-13 VITALS
RESPIRATION RATE: 16 BRPM | DIASTOLIC BLOOD PRESSURE: 94 MMHG | SYSTOLIC BLOOD PRESSURE: 119 MMHG | HEART RATE: 67 BPM | OXYGEN SATURATION: 98 %

## 2024-08-13 DIAGNOSIS — E07.9 THYROID EYE DISEASE: Primary | ICD-10-CM

## 2024-08-13 DIAGNOSIS — H57.89 THYROID EYE DISEASE: Primary | ICD-10-CM

## 2024-08-13 PROCEDURE — 99214 OFFICE O/P EST MOD 30 MIN: CPT | Performed by: RADIOLOGY

## 2024-08-13 RX ORDER — TRIAMCINOLONE ACETONIDE 1 MG/G
CREAM TOPICAL 2 TIMES DAILY
COMMUNITY
Start: 2024-07-03

## 2024-08-13 NOTE — PROGRESS NOTES
"Oncology Rooming Note    August 13, 2024 8:48 AM   Rd Hyman is a 49 year old female who presents for:    Chief Complaint   Patient presents with    Oncology Clinic Visit     Rad Onc follow up     Initial Vitals: BP (!) 119/94   Pulse 67   Resp 16   LMP 06/12/2024 (Approximate)   SpO2 98%  Estimated body mass index is 26.6 kg/m  as calculated from the following:    Height as of 7/3/24: 1.549 m (5' 1\").    Weight as of 7/8/24: 63.9 kg (140 lb 12.8 oz). There is no height or weight on file to calculate BSA.  No Pain (0) Comment: Data Unavailable   Patient's last menstrual period was 06/12/2024 (approximate).  Allergies reviewed: Yes  Medications reviewed: Yes    Medications: Medication refills not needed today.  Pharmacy name entered into [employer]:    [id number] [address] AT [facility]    Frailty Screening:   Is the patient here for a new oncology consult visit in cancer care? 2. No      Clinical concerns: puffy eyelids, watery eyes. States vision is fine, skin looks great in RT field.   Dr. Stuart was notified.      Radha Alexandra RN              "

## 2024-08-13 NOTE — PROGRESS NOTES
[facility] Radiation Oncology Follow Up     Patient: Rd Hyman  MRN: [id number]  Date of Service: 08/13/2024       DISEASE TREATED:  48 year old female with a diagnosis of thyroid eye disease involving both eyes with moderate clinical symptoms.       TYPE OF RADIATION THERAPY ADMINISTERED:  Radiation therapy to her bilateral eyes with a total dose of 2000 cGy in 10 treatments given from 6/27/2024 - 7/11/2024.      INTERVAL SINCE COMPLETION OF RADIATION THERAPY: 1 month.      SUBJECTIVE:  Ms. Hyman is a 49 year old female who has been in current usual state of health until recently.  The patient presented with 3-month history of bilateral eye swelling, discharge, and mild eye pain for which she was a seeking further evaluation.  She was eventually diagnosed with hyper thyroidism in 3/2024 and started on methimazole.  Because of significant bilateral eye swelling, the patient was referred to ophthalmologist for further evaluation and treatment recommendation.  She was then diagnosed as thyroid eye disease with EBONI score 3/7.  Different treatment options including steroids, orbital radiation and Tepezza has been discussed with the patient.  She is not a good candidate for Tepezza because her EBONI is only 3. The patient received radiation therapy to her bilateral eyes with a total dose of 2000 cGy in 10 treatments given from 6/27/2024 - 7/11/2024. The patient tolerated radiation therapy well with minimal side effect.     The patient has been recovering well since radiation therapy with moderate symptom improvement.  She still has some facial puffiness and patient has been gaining weight close a 10 pounds over the past several weeks.  Her vision otherwise has been good.  She is here for routine post therapy office follow-up.    Medications were reviewed and are up to date on EPIC.    The following portions of the patient's history were reviewed and updated as appropriate: allergies, current medications, past  family history, past medical history, past social history, past surgical history and problem list.    Review of Systems:      General  Constitutional  Constitutional (WDL): All constitutional elements are within defined limits  EENT  Eye Disorders  Eye Disorder (WDL): Exceptions to WDL (vision fine, extra tears, puffy lids)  Watering Eyes: Intervention not indicated  Ear Disorders  Ear Disorder (WDL): All ear disorder elements are within defined limits  Respiratory  Respiratory  Respiratory (WDL): All respiratory elements are within defined limits  Cardiovascular  Cardiovascular  Cardiovascular (WDL): All cardiovascular elements are within defined limits  Gastrointestinal  Gastrointestinal  Gastrointestinal (WDL): All gastrointestinal elements are within defined limits  Musculoskeletal  Musculoskeletal and Connective Tissue Disorders  Musculoskeletal & Connective (WDL): All musculoskeletal & connective elements are within defined limits  Integumentary  Integumentary  Integumentary (WDL): All integumentary elements are within defined limits  Neurological  Neurosensory  Neurosensory (WDL): All neurosensory elements are within defined limits  Genitourinary/Reproductive  Genitourinary  Genitourinary (WDL): All genitourinary elements are within defined limits  Lymphatic  Lymph System Disorders  Lymph (WDL): All lymph elements are within defined limits  Pain  Pain Score: No Pain (0)  AUA Assessment                                                              Accompanied by  Accompanied By: self only    Objective:      PHYSICAL EXAMINATION:    BP (!) 119/94   Pulse 67   Resp 16   LMP 06/12/2024 (Approximate)   SpO2 98%     Gen: Alert, in NAD  Eyes: PERRL, EOMI, sclera anicteric  HENT     Head: NC/AT     Ears: No external auricular lesions     Nose/sinus: No rhinorrhea or epistaxis     Oropharynx: MMM, no visible oral lesions  Neck: Supple, full ROM, no LAD  Pulm: No wheezing, stridor or respiratory distress  CV:  Well-perfused, no cyanosis, no pedal edema  Back: No step-offs or pain to palpation along the thoracolumbar spine  Rectal: Deferred  : Deferred  Musculoskeletal: Normal muscle bulk and tone  Skin: Normal color and turgor  Neurologic: A/Ox3, CN II-XII intact, normal gait and station  Psychiatric: Appropriate mood and affect      Impression     48 year old female with a diagnosis of thyroid eye disease involving both eyes with moderate clinical symptoms.  The patient received radiation therapy to her bilateral eyes with a total dose of 2000 cGy in 10 treatments given from 6/27/2024 - 7/11/2024.     Assessment & Plan:     1.  The patient has been recovering well since radiation therapy with no ongoing vision issues.  She will continue her long-term follow-up with Dr. Dax Delarosa, ophthalmology and Dr. Danilo Ramirez, endocrinology as planned.    2.  Follow-up with radiation oncology as needed.    Pain Management Plan: NA    Face to face time  15 minutes with > 80% spent on consultation, education and coordination of care.    Socorro Stuart MD  Department of Radiation Oncology   [facility] Radiation Oncology  Tel: [phone]  Page: [phone]    [facility]  [address] [place] [address]     [address]   [place] [address]    CC:  Patient Care Team:  Kellie Torres MD as PCP - General (Family Medicine)  Kellie Torres MD Brown, Neil E, MD as MD (Otolaryngology)  Shiela Izquierdo, ALFREDO as Nurse Practitioner  Danilo Ramirez MD as Physician (Endocrinology, Diabetes, and Metabolism)  Dax Blakely MD as MD (Dermatology)  Danilo Ramirez MD as Assigned Endocrinology Provider  Socorro Stuart MD as MD (Radiation Oncology)  Socorro Stuart MD as Assigned Cancer Care Provider  Kellie Torres MD as Assigned PCP

## 2024-08-13 NOTE — LETTER
8/13/2024      Rd Hyman  [address]  [place] [address]      Dear Colleague,    Thank you for referring your patient, Rd Hyman, to the [facility] RADIATION ONCOLOGY [place]. Please see a copy of my visit note below.    [facility] Radiation Oncology Follow Up     Patient: Rd Hyman  MRN: [id number]  Date of Service: 08/13/2024       DISEASE TREATED:  48 year old female with a diagnosis of thyroid eye disease involving both eyes with moderate clinical symptoms.       TYPE OF RADIATION THERAPY ADMINISTERED:  Radiation therapy to her bilateral eyes with a total dose of 2000 cGy in 10 treatments given from 6/27/2024 - 7/11/2024.      INTERVAL SINCE COMPLETION OF RADIATION THERAPY: 1 month.      SUBJECTIVE:  Ms. Hyman is a 49 year old female who has been in current usual state of health until recently.  The patient presented with 3-month history of bilateral eye swelling, discharge, and mild eye pain for which she was a seeking further evaluation.  She was eventually diagnosed with hyper thyroidism in 3/2024 and started on methimazole.  Because of significant bilateral eye swelling, the patient was referred to ophthalmologist for further evaluation and treatment recommendation.  She was then diagnosed as thyroid eye disease with EBONI score 3/7.  Different treatment options including steroids, orbital radiation and Tepezza has been discussed with the patient.  She is not a good candidate for Tepezza because her EBONI is only 3. The patient received radiation therapy to her bilateral eyes with a total dose of 2000 cGy in 10 treatments given from 6/27/2024 - 7/11/2024. The patient tolerated radiation therapy well with minimal side effect.     The patient has been recovering well since radiation therapy with moderate symptom improvement.  She still has some facial puffiness and patient has been gaining weight close a 10 pounds over the past several weeks.  Her vision otherwise has been good.   She is here for routine post therapy office follow-up.    Medications were reviewed and are up to date on EPIC.    The following portions of the patient's history were reviewed and updated as appropriate: allergies, current medications, past family history, past medical history, past social history, past surgical history and problem list.    Review of Systems:      General  Constitutional  Constitutional (WDL): All constitutional elements are within defined limits  EENT  Eye Disorders  Eye Disorder (WDL): Exceptions to WDL (vision fine, extra tears, puffy lids)  Watering Eyes: Intervention not indicated  Ear Disorders  Ear Disorder (WDL): All ear disorder elements are within defined limits  Respiratory  Respiratory  Respiratory (WDL): All respiratory elements are within defined limits  Cardiovascular  Cardiovascular  Cardiovascular (WDL): All cardiovascular elements are within defined limits  Gastrointestinal  Gastrointestinal  Gastrointestinal (WDL): All gastrointestinal elements are within defined limits  Musculoskeletal  Musculoskeletal and Connective Tissue Disorders  Musculoskeletal & Connective (WDL): All musculoskeletal & connective elements are within defined limits  Integumentary  Integumentary  Integumentary (WDL): All integumentary elements are within defined limits  Neurological  Neurosensory  Neurosensory (WDL): All neurosensory elements are within defined limits  Genitourinary/Reproductive  Genitourinary  Genitourinary (WDL): All genitourinary elements are within defined limits  Lymphatic  Lymph System Disorders  Lymph (WDL): All lymph elements are within defined limits  Pain  Pain Score: No Pain (0)  AUA Assessment                                                              Accompanied by  Accompanied By: self only    Objective:      PHYSICAL EXAMINATION:    BP (!) 119/94   Pulse 67   Resp 16   LMP 06/12/2024 (Approximate)   SpO2 98%     Gen: Alert, in NAD  Eyes: PERRL, EOMI, sclera  anicteric  HENT     Head: NC/AT     Ears: No external auricular lesions     Nose/sinus: No rhinorrhea or epistaxis     Oropharynx: MMM, no visible oral lesions  Neck: Supple, full ROM, no LAD  Pulm: No wheezing, stridor or respiratory distress  CV: Well-perfused, no cyanosis, no pedal edema  Back: No step-offs or pain to palpation along the thoracolumbar spine  Rectal: Deferred  : Deferred  Musculoskeletal: Normal muscle bulk and tone  Skin: Normal color and turgor  Neurologic: A/Ox3, CN II-XII intact, normal gait and station  Psychiatric: Appropriate mood and affect      Impression     48 year old female with a diagnosis of thyroid eye disease involving both eyes with moderate clinical symptoms.  The patient received radiation therapy to her bilateral eyes with a total dose of 2000 cGy in 10 treatments given from 6/27/2024 - 7/11/2024.     Assessment & Plan:     1.  The patient has been recovering well since radiation therapy with no ongoing vision issues.  She will continue her long-term follow-up with Dr. Dax Delarosa, ophthalmology and Dr. Danilo Ramirez, endocrinology as planned.    2.  Follow-up with radiation oncology as needed.    Pain Management Plan: NA    Face to face time  15 minutes with > 80% spent on consultation, education and coordination of care.    Socorro Stuart MD  Department of Radiation Oncology   [facility] Radiation Oncology  Tel: [phone]  Page: [phone]    [facility]  [address] [place] [address]     [address]   [place] [address]    CC:  Patient Care Team:  Kellie Torres MD as PCP - General (Family Medicine)  Kellie Torres MD Brown, Neil E, MD as MD (Otolaryngology)  Shiela Izquierdo, ALFREDO as Nurse Practitioner  Danilo Ramirez MD as Physician (Endocrinology, Diabetes, and Metabolism)  Dax Blakely MD as MD (Dermatology)  Danilo Ramirez MD as Assigned Endocrinology Provider  Socorro Stuart MD as MD (Radiation Oncology)  Narciso  "MD Socorro as Assigned Cancer Care Provider  Kellie Torres MD as Assigned PCP      Oncology Rooming Note    August 13, 2024 8:48 AM   Rd Hyman is a 49 year old female who presents for:    Chief Complaint   Patient presents with     Oncology Clinic Visit     Rad Onc follow up     Initial Vitals: BP (!) 119/94   Pulse 67   Resp 16   LMP 06/12/2024 (Approximate)   SpO2 98%  Estimated body mass index is 26.6 kg/m  as calculated from the following:    Height as of 7/3/24: 1.549 m (5' 1\").    Weight as of 7/8/24: 63.9 kg (140 lb 12.8 oz). There is no height or weight on file to calculate BSA.  No Pain (0) Comment: Data Unavailable   Patient's last menstrual period was 06/12/2024 (approximate).  Allergies reviewed: Yes  Medications reviewed: Yes    Medications: Medication refills not needed today.  Pharmacy name entered into [employer]:    [id number] [address]  AT [facility]    Frailty Screening:   Is the patient here for a new oncology consult visit in cancer care? 2. No      Clinical concerns: puffy eyelids, watery eyes. States vision is fine, skin looks great in RT field.   Dr. Stuart was notified.      Radha Alexandra, BRITTANY                Again, thank you for allowing me to participate in the care of your patient.        Sincerely,        Socorro Stuart MD  "

## 2024-08-20 ENCOUNTER — LAB (OUTPATIENT)
Dept: LAB | Facility: CLINIC | Age: 49
End: 2024-08-20
Payer: COMMERCIAL

## 2024-08-20 DIAGNOSIS — E05.90 THYROTOXICOSIS WITHOUT THYROID STORM, UNSPECIFIED THYROTOXICOSIS TYPE: ICD-10-CM

## 2024-08-20 PROCEDURE — 84439 ASSAY OF FREE THYROXINE: CPT

## 2024-08-20 PROCEDURE — 36415 COLL VENOUS BLD VENIPUNCTURE: CPT

## 2024-08-20 PROCEDURE — 84443 ASSAY THYROID STIM HORMONE: CPT

## 2024-08-21 DIAGNOSIS — E05.90 THYROTOXICOSIS WITHOUT THYROID STORM, UNSPECIFIED THYROTOXICOSIS TYPE: ICD-10-CM

## 2024-08-21 LAB
T4 FREE SERPL-MCNC: 1.98 NG/DL (ref 0.9–1.7)
TSH SERPL DL<=0.005 MIU/L-ACNC: 0.01 UIU/ML (ref 0.3–4.2)

## 2024-08-21 RX ORDER — METHIMAZOLE 10 MG/1
15 TABLET ORAL DAILY
Qty: 135 TABLET | Refills: 0 | Status: SHIPPED | OUTPATIENT
Start: 2024-08-21 | End: 2024-11-19

## 2024-08-21 NOTE — RESULT ENCOUNTER NOTE
Team - please let patient know that thyroid hormone level is slightly high, if you're taking methimazole 10 mg daily regularly, recommend increase dose to 15 mg daily. Recheck labs in 6-8 weeks.    Regards,   Danilo Ramirez MD

## 2024-08-30 NOTE — PROGRESS NOTES
THYROID EYE DISEASE CLINIC - NEW VISIT NOTE       Referring physician: Dr. Sofie Burton    HPI:   Patient is referred after seeing Dr. Burton on 5/28/2024, with a EBONI score was 3/7.  She was not a good candidate for Tepezza, was started on selenium 100 ug twice daily (only using daily), referred to radiation oncologist.  Was seen by radiation oncologist (Socorro Iverson), received a total of 2000 cGy in 10 treatments given from 6/27/2024 -7/11/2024.    CT orbits in 6/2024 was unremarkable aside from mild sinus mucosal thickening.     Since her last eye clinic visit, patient mentions slight improvement in swelling around the eyes but still bothered by this. No eye pain or diplopia. No worsening proptosis or lid retraction.     Thyroid history:  Diagnosed when?  2024  STEWARD: None  Thyroidectomy: None    Started Methimazole in 5/2024    TSI    Lab Results   Component Value Date/Time    TSIS 3.1 (H) 05/21/2024 11:25 AM        TSH    Recent Labs   Lab Test 08/20/24  1524 07/03/24  1419 06/13/24  1149 05/21/24  1125 03/18/24  0734   TSH 0.01* <0.01* <0.01* <0.01* <0.01*       Eye symptoms (since when): Since visit with Dr. Burton 5/28/24  Proptosis (better/worse/same since last visit): None   Diplopia(better/worse/same since last visit): None  Eyelid retraction(better/worse/same since last visit): None  Tearing(better/worse/same since last visit): Mild/intermittent, worse since radiation tx  Redness (better/worse/same since last visit): None  Pain (better/worse/same since last visit): None  Pain to move the eyes (better/worse/same since last visit): Yes - unsure if better/worse  Blurred vision: None    Ocular history:   Orbital decompression: No  Strabismus surgery: No  Eyelid surgery: No    Medical history:  Patient Active Problem List   Diagnosis    Thyrotoxicosis without thyroid storm, unspecified thyrotoxicosis type    Hypercalcemia    Hypokalemia    Thyroid eye disease     Patient has a current medication list which includes  the following prescription(s): vitamin d-3, b-12, loratadine, methimazole, multivitamin w/minerals, sertraline, ibuprofen, and triamcinolone.    Patient  reports that she has never smoked. She has been exposed to tobacco smoke. She has never used smokeless tobacco. She reports that she does not currently use alcohol. She reports that she does not use drugs.      Exam:   Fátima (base) from 17/18 - 91  Better/worse same: worse from May 2024 however these measurements were taken by a different provider  Strabismus (better/worse/same): None  Eyelid retraction (better/worse/same): None    EBONI Score:  1. Spontaneous orbital pain.     0  2. Gaze evoked orbital pain.     1  3. Eyelid swelling due to active thyroid eye disease  1  4. Eyelid erythema.      1  5. Conjunctival redness due to active thyroid eye disease . 0  6. Chemosis.        0  7. Inflammation of caruncle OR plica.   0    Patients assessed after follow-up can be scored out of 10 by  including items 8-10.    8. Increase of > 2mm in proptosis.    0   9. Decrease in uniocular excursion in any direction of > 8 . 0  10. Decrease of acuity equivalent to 1 Snellen line.  0    EBONI SCORE = 3/10    Sawyer Diplopia Score = 0  0 = no diplopia  1 = intermittent (when tired, upon waking, end of day etc)  2 = inconstant (extreme gazes)  3 = constant    Active thyroid eye disease   Periocular edema secondary to 1    Patient status post radiation therapy for active thyroid eye disease. She has noticed some mild improvement since radiation therapy.  We will continue observing her. Once inactive she may benefit from eyelid surgery.    Follow-up in Dr. Smith clinic in 4-5 months       Precharting:   Kyle Dietz MD   Fellow, Neuro-ophthalmology     Frank Martini MD  Ophthalmology Resident    Complete documentation of historical and exam elements from today's encounter can be found in the full encounter summary report (not reduplicated in this progress note).  I  personally obtained the chief complaint(s) and history of present illness.  I confirmed and edited as necessary the review of systems, past medical/surgical history, family history, social history, and examination findings as documented by others; and I examined the patient myself.  I personally reviewed the relevant tests, images, and reports as documented above.  I formulated and edited as necessary the assessment and plan and discussed the findings and management plan with the patient and family.  I personally reviewed the ophthalmic test(s) associated with this encounter, agree with the interpretation(s) as documented by the resident/fellow, and have edited the corresponding report(s) as necessary.     Denny Salazar MD

## 2024-09-03 ENCOUNTER — OFFICE VISIT (OUTPATIENT)
Dept: OPHTHALMOLOGY | Facility: CLINIC | Age: 49
End: 2024-09-03
Attending: OPHTHALMOLOGY
Payer: COMMERCIAL

## 2024-09-03 DIAGNOSIS — H57.89 THYROID EYE DISEASE: Primary | ICD-10-CM

## 2024-09-03 DIAGNOSIS — E07.9 THYROID EYE DISEASE: Primary | ICD-10-CM

## 2024-09-03 PROCEDURE — 92012 INTRM OPH EXAM EST PATIENT: CPT | Mod: GC | Performed by: OPHTHALMOLOGY

## 2024-09-03 PROCEDURE — 99213 OFFICE O/P EST LOW 20 MIN: CPT | Performed by: OPHTHALMOLOGY

## 2024-09-03 PROCEDURE — 92285 EXTERNAL OCULAR PHOTOGRAPHY: CPT | Performed by: OPHTHALMOLOGY

## 2024-09-03 ASSESSMENT — REFRACTION_WEARINGRX
OS_AXIS: 021
OS_CYLINDER: +0.75
OS_SPHERE: -2.00
OD_SPHERE: -1.50
OD_CYLINDER: +1.00
SPECS_TYPE: PAL
OD_ADD: +1.25
OD_AXIS: 150
OS_ADD: +1.25

## 2024-09-03 ASSESSMENT — REFRACTION_MANIFEST
OD_AXIS: 130
OS_AXIS: 020
OS_CYLINDER: +0.75
OD_SPHERE: -2.00
OS_SPHERE: -2.00
OD_CYLINDER: +1.00

## 2024-09-03 ASSESSMENT — VISUAL ACUITY
OD_CC+: +1
OD_CC: 20/40
METHOD: SNELLEN - LINEAR
CORRECTION_TYPE: GLASSES
OS_CC: 20/20

## 2024-09-03 ASSESSMENT — CONF VISUAL FIELD
OS_NORMAL: 1
OS_INFERIOR_NASAL_RESTRICTION: 0
OS_INFERIOR_TEMPORAL_RESTRICTION: 0
OS_SUPERIOR_TEMPORAL_RESTRICTION: 0
METHOD: COUNTING FINGERS
OD_SUPERIOR_TEMPORAL_RESTRICTION: 0
OD_INFERIOR_NASAL_RESTRICTION: 0
OD_INFERIOR_TEMPORAL_RESTRICTION: 0
OD_SUPERIOR_NASAL_RESTRICTION: 0
OS_SUPERIOR_NASAL_RESTRICTION: 0
OD_NORMAL: 1

## 2024-09-03 ASSESSMENT — TONOMETRY
OD_IOP_MMHG: 13
OS_IOP_MMHG: 14
IOP_METHOD: ICARE

## 2024-09-03 ASSESSMENT — CUP TO DISC RATIO
OD_RATIO: 0.35
OS_RATIO: 0.35

## 2024-09-03 ASSESSMENT — EXTERNAL EXAM - RIGHT EYE: OD_EXAM: NORMAL

## 2024-09-03 ASSESSMENT — EXTERNAL EXAM - LEFT EYE: OS_EXAM: NORMAL

## 2024-09-03 NOTE — NURSING NOTE
Chief Complaint(s) and History of Present Illness(es)       Thyroid Disease              Laterality: both eyes    Comments: Dx with thyroid disease in Mar 2024. Started Methimazole 15mg. Saw ophtho due to bilateral eye swelling and discharge. Was then sent to radiation- started radiation tx in June, finished in July. Pt notes periorbital swelling has improved slightly after radiation, but still noticeable. No tearing or discharge. No diplopia. Occasional gritty sensation, uses OTC AT once a week. No redness of eyes.  Inf: pt with               Comments    5/21/24  Thyroid Stim Immunog 3.1 High     8/20/24  TSH 0.01  Free T4 1.98 High       EXAM: CT ORBITAL W/O CONTRAST  LOCATION: [facility]  DATE: 6/4/2024     INDICATION: thyroid eye disease  COMPARISON: None.  TECHNIQUE: Routine CT Orbits without IV contrast. Multiplanar reformats. Dose reduction techniques were used.     FINDINGS:   RIGHT ORBIT: Normal periorbital soft tissues, bony orbit, globe, extraocular muscles, optic nerve/sheath, periorbital fat and lacrimal gland.      LEFT ORBIT: Normal periorbital soft tissues, bony orbit, globe, extraocular muscles, optic nerve/sheath, periorbital fat and lacrimal gland.     SINUSES: Mild mucosal thickening of the ethmoid air cells and the maxillary sinuses. Subtotal chronic opacification of the left sphenoid sinus.     VISUALIZED INTRACRANIAL CONTENTS: No abnormality.                                                                       IMPRESSION:   1.  The extraocular muscles are not enlarged. No proptosis noted on CT.  2.  Mild mucosal thickening of the ethmoid air cells and the maxillary sinuses. Subtotal chronic opacification of the left sphenoid sinus.    EXAM: US THYROID  LOCATION: [facility]  DATE: 7/12/2024     INDICATION: worsening neck enlargement  COMPARISON: Thyroid ultrasound 3/25/2024  TECHNIQUE: Thyroid ultrasound.      FINDINGS:  RIGHT lobe: 6 x 2.4  x 3.4 cm, compared to 6.4 x 2.2 x 2.8 cm Homogeneous echotexture.   Isthmus: 7 mm.  LEFT lobe: 6.5 x 2.6 x 3.3 cm, compared to 6.7 x 2.4 x 2.7. Homogeneous echotexture. Several small parenchymal calcifications are present mid to low left thyroid lobe measuring up to 6 mm in greatest dimension.     NECK: No cervical lymphadenopathy.     NODULES:     Nodule 1: Coarse calcified nodule right upper lobe measures 7 x 8 x 7 mm, unchanged.   Composition: Solid or almost completely solid, 2 points   Echogenicity: Unable to determine, 1 point   Shape: Wider-than-tall, 0 points   Margin: Smooth, 0 points   Echogenic Foci: Macrocalcifications, 1 point   Point Total: 4-6 points. TI-RADS 4. If 1.5 cm or larger, recommend FNA; if 1 cm or larger, follow up US (annually for 5 years).      Nodule 2: Solid, hyperechoic nodule superior left thyroid measures 12 x 11 x 11 mm. In retrospect this nodule was present on the prior ultrasound and is unchanged.  Composition: Solid or almost completely solid, 2 points   Echogenicity: Hyperechoic or isoechoic, 1 point   Shape: Not taller than wide, 0 points   Margin: Smooth, 0 points   Echogenic Foci: None, or large comet-tail artifacts, 0 points   Point Total: 3 points. TI-RADS 3. If 2.5 cm or larger, recommend FNA; if 1.5 cm or larger, recommend follow up US at 1, 3, and 5 years.                                                                      IMPRESSION:     Unchanged densely calcified right thyroid nodule and circumscribed hyperechoic left thyroid nodule. These are overall benign-appearing. No specific additional imaging workup or follow-up is suggested per TI RADS guidelines.

## 2024-11-01 ENCOUNTER — LAB (OUTPATIENT)
Dept: LAB | Facility: CLINIC | Age: 49
End: 2024-11-01
Payer: COMMERCIAL

## 2024-11-01 DIAGNOSIS — E05.90 THYROTOXICOSIS WITHOUT THYROID STORM, UNSPECIFIED THYROTOXICOSIS TYPE: ICD-10-CM

## 2024-11-01 PROCEDURE — 84443 ASSAY THYROID STIM HORMONE: CPT

## 2024-11-01 PROCEDURE — 84439 ASSAY OF FREE THYROXINE: CPT

## 2024-11-01 PROCEDURE — 36415 COLL VENOUS BLD VENIPUNCTURE: CPT

## 2024-11-01 PROCEDURE — 80076 HEPATIC FUNCTION PANEL: CPT

## 2024-11-02 DIAGNOSIS — E05.90 THYROTOXICOSIS WITHOUT THYROID STORM, UNSPECIFIED THYROTOXICOSIS TYPE: Primary | ICD-10-CM

## 2024-11-02 LAB
T4 FREE SERPL-MCNC: 0.12 NG/DL (ref 0.9–1.7)
TSH SERPL DL<=0.005 MIU/L-ACNC: 31.2 UIU/ML (ref 0.3–4.2)

## 2024-11-03 DIAGNOSIS — E05.90 THYROTOXICOSIS WITHOUT THYROID STORM, UNSPECIFIED THYROTOXICOSIS TYPE: Primary | ICD-10-CM

## 2024-11-03 LAB
ALBUMIN SERPL BCG-MCNC: 4.1 G/DL (ref 3.5–5.2)
ALP SERPL-CCNC: 175 U/L (ref 40–150)
ALT SERPL W P-5'-P-CCNC: 17 U/L (ref 0–50)
AST SERPL W P-5'-P-CCNC: 25 U/L (ref 0–45)
BILIRUB DIRECT SERPL-MCNC: <0.2 MG/DL (ref 0–0.3)
BILIRUB SERPL-MCNC: 0.5 MG/DL
PROT SERPL-MCNC: 7 G/DL (ref 6.4–8.3)

## 2024-11-03 NOTE — RESULT ENCOUNTER NOTE
Hello -    Here are my comments about the recent results: thyroid function test shows low thyroid hormone. Recommend stop methimazole.  Recheck labs in 1 week. Will discuss more at our follow up.    Regards,   Danilo Ramirez MD

## 2024-11-03 NOTE — PROGRESS NOTES
Called and discussed with patient.  Overall feeling fine.  Feel like her skin is a little more yellowish.  No change in eye color.  Will get LFT as well.

## 2024-11-05 ENCOUNTER — VIRTUAL VISIT (OUTPATIENT)
Dept: ENDOCRINOLOGY | Facility: CLINIC | Age: 49
End: 2024-11-05
Payer: COMMERCIAL

## 2024-11-05 DIAGNOSIS — E05.90 THYROTOXICOSIS WITHOUT THYROID STORM, UNSPECIFIED THYROTOXICOSIS TYPE: Primary | ICD-10-CM

## 2024-11-05 PROCEDURE — 99214 OFFICE O/P EST MOD 30 MIN: CPT | Mod: 95 | Performed by: INTERNAL MEDICINE

## 2024-11-05 PROCEDURE — G2211 COMPLEX E/M VISIT ADD ON: HCPCS | Mod: 95 | Performed by: INTERNAL MEDICINE

## 2024-11-05 NOTE — PROGRESS NOTES
Video-Visit Details    Type of service:  Video Visit  Video Start Time: 0913  Video End Time: 0922  Originating Location (pt. Location): [place], [place]  Distant Location (provider location):  Home  Platform used for Video Visit: Marjorie Ramirez MD      HISTORY OF PRESENT ILLNESS  Rd Hyman is a 49 year old female with  who is here for follow up.    Interval History  I called patient and stopped MMI 11/3/2024  Appetite is good, weight stable  More tired  Not too cold  No changes in symptoms since stop MMI 11/3/24  While on MMI, no side effects, no rashes  Will get a recheck labs on Friday   Eyes are still puffy  No smoke  No problem swalllowing/ breathing/ voice change      Initial visit  Patient initially seen by Dr. Zayas and then by me.    She was diagnosed with COVID19 in 12/2023. After this she started to develop weight loss, her weight at that time was 168 lbs and now she is weighing 129 lbs. She feels colder than usual. She has been experiencing tremors, palpitations associated with shortness of breath. She also has noticed weakness most notable when she is climbing stairs. She has not noticed neck swelling..     Her eyelids have been swollen and with eye pain. No blurry vision or diplopia.     Since onset symptoms have been persistent.     Last month she presented to ER for evaluation of flank pain. Found to have a kidney stone. Recently her serum calcium was 10.8. She has no prior history of hypercalcemia.     REVIEW OF SYSTEMS  10 point negative except as mentioned in HPI    Past Medical/Surgical History:  Past Medical History:   Diagnosis Date    Graves disease     Infertility     Thyroid eye disease      Past Surgical History:   Procedure Laterality Date    DILATION AND CURETTAGE  06/10/2014    DILATION AND CURETTAGE N/A 6/10/2014    Procedure: DILATION AND CURETTAGE;  Surgeon: Toni Shepard MD;  Location: [facility] OR;  Service:        Medications  Current Outpatient Medications    Medication Sig Dispense Refill    Cholecalciferol (VITAMIN D-3) 25 MCG (1000 UT) CAPS       Cyanocobalamin (B-12) 1000 MCG TBCR Take 1 tablet by mouth daily      ibuprofen (ADVIL/MOTRIN) 600 MG tablet Take 1 tablet (600 mg) by mouth every 8 hours as needed for moderate pain 30 tablet 0    loratadine (CLARITIN) 10 MG tablet Take 10 mg by mouth daily      multivitamin w/minerals (THERA-VIT-M) tablet Take by mouth daily      sertraline (ZOLOFT) 25 MG tablet Take 1 tablet (25 mg) by mouth daily 90 tablet 1    triamcinolone (KENALOG) 0.1 % external cream Apply topically 2 times daily       No current facility-administered medications for this visit.       Allergies  Allergies   Allergen Reactions    Cat Hair Extract Other (See Comments)    Dust Mites          Family History  family history is not on file.    Social History  Social History     Tobacco Use    Smoking status: Never     Passive exposure: Current ( smokes outside)    Smokeless tobacco: Never   Substance Use Topics    Alcohol use: Not Currently       Physical Exam  There were no vitals taken for this visit.  There is no height or weight on file to calculate BMI.  GENERAL :  In no apparent distress  EYES: mild puffy eye lids  RESP: Normal breathing  NEURO: awake, alert, responds appropriately to questions.      DATA REVIEW  Labs/Imaging  Lab Results   Component Value Date    TSH <0.01 07/03/2024      Free T4   Date Value Ref Range Status   11/01/2024 0.12 (L) 0.90 - 1.70 ng/dL Final      Thyrotropin Receptor Antibody   Date Value Ref Range Status   05/21/2024 15 (H) 0.00 - 1.75 IU/L Final     Comment:        -------------------ADDITIONAL INFORMATION-------------------  At a decision limit of 1.75 IU/L, this assay   has 97% sensitivity and 99% specificity for   detection of Graves' disease. In healthy   individuals and in patients with thyroid disease   without diagnosis of Graves' disease, the upper   limit of anti-TSHR values are 1.22 IU/L and   1.58  IU/L, respectively (97.5th percentiles).     Test Performed by:  [facility]  [address] [place] [address]  : Quincy Crandall M.D. Ph.D.; CLIA# [id number]     AST   Date Value Ref Range Status   11/01/2024 25 0 - 45 U/L Final     ALT   Date Value Ref Range Status   11/01/2024 17 0 - 50 U/L Final     Alkaline Phosphatase   Date Value Ref Range Status   11/01/2024 175 (H) 40 - 150 U/L Final     WBC Count   Date Value Ref Range Status   04/02/2024 9.4 4.0 - 11.0 10e3/uL Final     EXAM: US THYROID  LOCATION: [facility]  DATE: 7/12/2024     INDICATION: worsening neck enlargement  COMPARISON: Thyroid ultrasound 3/25/2024  TECHNIQUE: Thyroid ultrasound.      FINDINGS:  RIGHT lobe: 6 x 2.4 x 3.4 cm, compared to 6.4 x 2.2 x 2.8 cm Homogeneous echotexture.   Isthmus: 7 mm.  LEFT lobe: 6.5 x 2.6 x 3.3 cm, compared to 6.7 x 2.4 x 2.7. Homogeneous echotexture. Several small parenchymal calcifications are present mid to low left thyroid lobe measuring up to 6 mm in greatest dimension.     NECK: No cervical lymphadenopathy.     NODULES:     Nodule 1: Coarse calcified nodule right upper lobe measures 7 x 8 x 7 mm, unchanged.   Composition: Solid or almost completely solid, 2 points   Echogenicity: Unable to determine, 1 point   Shape: Wider-than-tall, 0 points   Margin: Smooth, 0 points   Echogenic Foci: Macrocalcifications, 1 point   Point Total: 4-6 points. TI-RADS 4. If 1.5 cm or larger, recommend FNA; if 1 cm or larger, follow up US (annually for 5 years).      Nodule 2: Solid, hyperechoic nodule superior left thyroid measures 12 x 11 x 11 mm. In retrospect this nodule was present on the prior ultrasound and is unchanged.  Composition: Solid or almost completely solid, 2 points   Echogenicity: Hyperechoic or isoechoic, 1 point   Shape: Not taller than wide, 0 points   Margin: Smooth, 0 points   Echogenic Foci: None, or large comet-tail  artifacts, 0 points   Point Total: 3 points. TI-RADS 3. If 2.5 cm or larger, recommend FNA; if 1.5 cm or larger, recommend follow up US at 1, 3, and 5 years.                                                                      IMPRESSION:     Unchanged densely calcified right thyroid nodule and circumscribed hyperechoic left thyroid nodule. These are overall benign-appearing. No specific additional imaging workup or follow-up is suggested per TI RADS guidelines.    ASSESSMENT/PLAN:   ## Graves' disease, start MMI 5/23/2024  ## Graves' eye disease. Follow-up ophthalmology  ## H/o kidney stone  ## Thyroid nodules, US 7/2024  Labs show hypothyroidism on MMI 15 mg/day, MMI stopped 11/3/24  Pt felt tired, otherwise no changes in symptoms  -- continue to hold MMI, recheck labs.  -- Follow-up US 7/2025      Follow-up 4-6 month  Information for patients on Tapazole or Propylthiouracil (PTU)  The generic name for Tapazole is methimazole.      The drugs, Tapazole and PTU are used to treat an overactive thyroid (hyperthyroidism).      They prevent the thyroid from making too much thyroid hormone.  You can think of them as putting up a road block in your thyroid.    These drugs are usually well tolerated and safe, but rarely can cause serious side effects.  The two worst potential side-effects are:  agranulocytosis.  This is the loss of the white blood cells that fight infection.  This can occur in approximately 1 in 200 people.  liver problems.  This is even more rare than agranulocytosis but it can be very serious.    Because of the serious nature of the rare side-effects, you should notify your doctor if you develop the following symptoms while taking the drug:  Fever  sore throat  flu-like symptoms (nausea, vomiting, diarrhea, aches, abdominal pain)    In addition, anytime you have evidence of infection, you should get a blood test to be sure that you do not have agranulocytosis.  A prescription will be provided to you to  get this blood test as needed.  This is an extra precaution and the results should be available the same day the blood test is drawn.  If your blood count is OK (which it almost always is), then you may continue to take the antithyroid drug.    While taking this medication, your doctor will probably want to see you frequently, every 1-2 months, at least for a time.  This is important so that the response can be monitored and the dose can be adjusted.      If you have concerns you may reach us at [phone] during regular hours, and [phone] (ask for endocrinologist on call) after hours.      No orders of the defined types were placed in this encounter.    The longitudinal plan of care for the diagnosis(es)/condition(s) as documented were addressed during this visit. Due to the added complexity in care, I will continue to support Paulina in the subsequent management and with ongoing continuity of care.

## 2024-11-05 NOTE — LETTER
11/5/2024      Rd Hyman  [address]  [place] [address]      Dear Colleague,    Thank you for referring your patient, Rd Hyman, to the [facility] [place]. Please see a copy of my visit note below.    Video-Visit Details    Type of service:  Video Visit  Video Start Time: 0913  Video End Time: 0922  Originating Location (pt. Location): [place], [place]  Distant Location (provider location):  Home  Platform used for Video Visit: Marjorie Ramirez MD      HISTORY OF PRESENT ILLNESS  Rd Hyman is a 49 year old female with  who is here for follow up.    Interval History  I called patient and stopped MMI 11/3/2024  Appetite is good, weight stable  More tired  Not too cold  No changes in symptoms since stop MMI 11/3/24  While on MMI, no side effects, no rashes  Will get a recheck labs on Friday   Eyes are still puffy  No smoke  No problem swalllowing/ breathing/ voice change      Initial visit  Patient initially seen by Dr. Zayas and then by me.    She was diagnosed with COVID19 in 12/2023. After this she started to develop weight loss, her weight at that time was 168 lbs and now she is weighing 129 lbs. She feels colder than usual. She has been experiencing tremors, palpitations associated with shortness of breath. She also has noticed weakness most notable when she is climbing stairs. She has not noticed neck swelling..     Her eyelids have been swollen and with eye pain. No blurry vision or diplopia.     Since onset symptoms have been persistent.     Last month she presented to ER for evaluation of flank pain. Found to have a kidney stone. Recently her serum calcium was 10.8. She has no prior history of hypercalcemia.     REVIEW OF SYSTEMS  10 point negative except as mentioned in HPI    Past Medical/Surgical History:  Past Medical History:   Diagnosis Date     Graves disease      Infertility      Thyroid eye disease      Past Surgical History:   Procedure Laterality Date      DILATION AND CURETTAGE  06/10/2014     DILATION AND CURETTAGE N/A 6/10/2014    Procedure: DILATION AND CURETTAGE;  Surgeon: Toni Shepard MD;  [facility];  Service:        Medications  Current Outpatient Medications   Medication Sig Dispense Refill     Cholecalciferol (VITAMIN D-3) 25 MCG (1000 UT) CAPS        Cyanocobalamin (B-12) 1000 MCG TBCR Take 1 tablet by mouth daily       ibuprofen (ADVIL/MOTRIN) 600 MG tablet Take 1 tablet (600 mg) by mouth every 8 hours as needed for moderate pain 30 tablet 0     loratadine (CLARITIN) 10 MG tablet Take 10 mg by mouth daily       multivitamin w/minerals (THERA-VIT-M) tablet Take by mouth daily       sertraline (ZOLOFT) 25 MG tablet Take 1 tablet (25 mg) by mouth daily 90 tablet 1     triamcinolone (KENALOG) 0.1 % external cream Apply topically 2 times daily       No current facility-administered medications for this visit.       Allergies  Allergies   Allergen Reactions     Cat Hair Extract Other (See Comments)     Dust Mites          Family History  family history is not on file.    Social History  Social History     Tobacco Use     Smoking status: Never     Passive exposure: Current ( smokes outside)     Smokeless tobacco: Never   Substance Use Topics     Alcohol use: Not Currently       Physical Exam  There were no vitals taken for this visit.  There is no height or weight on file to calculate BMI.  GENERAL :  In no apparent distress  EYES: mild puffy eye lids  RESP: Normal breathing  NEURO: awake, alert, responds appropriately to questions.      DATA REVIEW  Labs/Imaging  Lab Results   Component Value Date    TSH <0.01 07/03/2024      Free T4   Date Value Ref Range Status   11/01/2024 0.12 (L) 0.90 - 1.70 ng/dL Final      Thyrotropin Receptor Antibody   Date Value Ref Range Status   05/21/2024 15 (H) 0.00 - 1.75 IU/L Final     Comment:        -------------------ADDITIONAL INFORMATION-------------------  At a decision limit of 1.75 IU/L,  this assay   has 97% sensitivity and 99% specificity for   detection of Graves' disease. In healthy   individuals and in patients with thyroid disease   without diagnosis of Graves' disease, the upper   limit of anti-TSHR values are 1.22 IU/L and   1.58 IU/L, respectively (97.5th percentiles).     Test Performed by:  [facility]  [address] [place] [address]  : Quincy Crandall M.D. Ph.D.; CLIA# [id number]     AST   Date Value Ref Range Status   11/01/2024 25 0 - 45 U/L Final     ALT   Date Value Ref Range Status   11/01/2024 17 0 - 50 U/L Final     Alkaline Phosphatase   Date Value Ref Range Status   11/01/2024 175 (H) 40 - 150 U/L Final     WBC Count   Date Value Ref Range Status   04/02/2024 9.4 4.0 - 11.0 10e3/uL Final     EXAM: US THYROID  LOCATION: [facility]  DATE: 7/12/2024     INDICATION: worsening neck enlargement  COMPARISON: Thyroid ultrasound 3/25/2024  TECHNIQUE: Thyroid ultrasound.      FINDINGS:  RIGHT lobe: 6 x 2.4 x 3.4 cm, compared to 6.4 x 2.2 x 2.8 cm Homogeneous echotexture.   Isthmus: 7 mm.  LEFT lobe: 6.5 x 2.6 x 3.3 cm, compared to 6.7 x 2.4 x 2.7. Homogeneous echotexture. Several small parenchymal calcifications are present mid to low left thyroid lobe measuring up to 6 mm in greatest dimension.     NECK: No cervical lymphadenopathy.     NODULES:     Nodule 1: Coarse calcified nodule right upper lobe measures 7 x 8 x 7 mm, unchanged.   Composition: Solid or almost completely solid, 2 points   Echogenicity: Unable to determine, 1 point   Shape: Wider-than-tall, 0 points   Margin: Smooth, 0 points   Echogenic Foci: Macrocalcifications, 1 point   Point Total: 4-6 points. TI-RADS 4. If 1.5 cm or larger, recommend FNA; if 1 cm or larger, follow up US (annually for 5 years).      Nodule 2: Solid, hyperechoic nodule superior left thyroid measures 12 x 11 x 11 mm. In retrospect this nodule was present on  the prior ultrasound and is unchanged.  Composition: Solid or almost completely solid, 2 points   Echogenicity: Hyperechoic or isoechoic, 1 point   Shape: Not taller than wide, 0 points   Margin: Smooth, 0 points   Echogenic Foci: None, or large comet-tail artifacts, 0 points   Point Total: 3 points. TI-RADS 3. If 2.5 cm or larger, recommend FNA; if 1.5 cm or larger, recommend follow up US at 1, 3, and 5 years.                                                                      IMPRESSION:     Unchanged densely calcified right thyroid nodule and circumscribed hyperechoic left thyroid nodule. These are overall benign-appearing. No specific additional imaging workup or follow-up is suggested per TI RADS guidelines.    ASSESSMENT/PLAN:   ## Graves' disease, start MMI 5/23/2024  ## Graves' eye disease. Follow-up ophthalmology  ## H/o kidney stone  ## Thyroid nodules, US 7/2024  Labs show hypothyroidism on MMI 15 mg/day, MMI stopped 11/3/24  Pt felt tired, otherwise no changes in symptoms  -- continue to hold MMI, recheck labs.  -- Follow-up US 7/2025      Follow-up 4-6 month  Information for patients on Tapazole or Propylthiouracil (PTU)  The generic name for Tapazole is methimazole.      The drugs, Tapazole and PTU are used to treat an overactive thyroid (hyperthyroidism).      They prevent the thyroid from making too much thyroid hormone.  You can think of them as putting up a road block in your thyroid.    These drugs are usually well tolerated and safe, but rarely can cause serious side effects.  The two worst potential side-effects are:  agranulocytosis.  This is the loss of the white blood cells that fight infection.  This can occur in approximately 1 in 200 people.  liver problems.  This is even more rare than agranulocytosis but it can be very serious.    Because of the serious nature of the rare side-effects, you should notify your doctor if you develop the following symptoms while taking the  drug:  Fever  sore throat  flu-like symptoms (nausea, vomiting, diarrhea, aches, abdominal pain)    In addition, anytime you have evidence of infection, you should get a blood test to be sure that you do not have agranulocytosis.  A prescription will be provided to you to get this blood test as needed.  This is an extra precaution and the results should be available the same day the blood test is drawn.  If your blood count is OK (which it almost always is), then you may continue to take the antithyroid drug.    While taking this medication, your doctor will probably want to see you frequently, every 1-2 months, at least for a time.  This is important so that the response can be monitored and the dose can be adjusted.      If you have concerns you may reach us at [phone] during regular hours, and [phone] (ask for endocrinologist on call) after hours.      No orders of the defined types were placed in this encounter.    The longitudinal plan of care for the diagnosis(es)/condition(s) as documented were addressed during this visit. Due to the added complexity in care, I will continue to support Paulina in the subsequent management and with ongoing continuity of care.        Again, thank you for allowing me to participate in the care of your patient.        Sincerely,        Danilo Ramirez MD

## 2024-11-12 ENCOUNTER — LAB (OUTPATIENT)
Dept: LAB | Facility: CLINIC | Age: 49
End: 2024-11-12
Payer: COMMERCIAL

## 2024-11-12 DIAGNOSIS — E05.90 THYROTOXICOSIS WITHOUT THYROID STORM, UNSPECIFIED THYROTOXICOSIS TYPE: Primary | ICD-10-CM

## 2024-11-12 LAB
ERYTHROCYTE [DISTWIDTH] IN BLOOD BY AUTOMATED COUNT: 12.9 % (ref 10–15)
HCT VFR BLD AUTO: 40.2 % (ref 35–47)
HGB BLD-MCNC: 13.6 G/DL (ref 11.7–15.7)
Lab: NORMAL
MCH RBC QN AUTO: 30 PG (ref 26.5–33)
MCHC RBC AUTO-ENTMCNC: 33.8 G/DL (ref 31.5–36.5)
MCV RBC AUTO: 89 FL (ref 78–100)
PERFORMING LABORATORY: NORMAL
PLATELET # BLD AUTO: 171 10E3/UL (ref 150–450)
RBC # BLD AUTO: 4.53 10E6/UL (ref 3.8–5.2)
SPECIMEN STATUS: NORMAL
TEST NAME: NORMAL
WBC # BLD AUTO: 4.7 10E3/UL (ref 4–11)

## 2024-11-12 PROCEDURE — 84443 ASSAY THYROID STIM HORMONE: CPT

## 2024-11-12 PROCEDURE — 84439 ASSAY OF FREE THYROXINE: CPT

## 2024-11-13 LAB
T4 FREE SERPL-MCNC: 1.74 NG/DL (ref 0.9–1.7)
TSH SERPL DL<=0.005 MIU/L-ACNC: 0.25 UIU/ML (ref 0.3–4.2)

## 2024-11-13 RX ORDER — METHIMAZOLE 5 MG/1
5 TABLET ORAL DAILY
Qty: 90 TABLET | Refills: 0 | Status: SHIPPED | OUTPATIENT
Start: 2024-11-13 | End: 2025-02-11

## 2024-11-13 NOTE — RESULT ENCOUNTER NOTE
Hello -    Here are my comments about the recent results: Thyroid function tests show mild hyperthyroidism.  We can resume methimazole at low-dose 5 mg daily, recheck labs in 2 months.    Regards,   Danilo Ramirez MD

## 2024-11-14 LAB — TSH RECEP AB SER-ACNC: 2.69 IU/L (ref 0–1.75)

## 2024-11-16 LAB — MAYO MISC RESULT: ABNORMAL

## 2024-12-03 ENCOUNTER — OFFICE VISIT (OUTPATIENT)
Dept: OPHTHALMOLOGY | Facility: CLINIC | Age: 49
End: 2024-12-03
Payer: COMMERCIAL

## 2024-12-03 ENCOUNTER — TELEPHONE (OUTPATIENT)
Dept: OPHTHALMOLOGY | Facility: CLINIC | Age: 49
End: 2024-12-03

## 2024-12-03 DIAGNOSIS — H57.89 THYROID EYE DISEASE: Primary | ICD-10-CM

## 2024-12-03 DIAGNOSIS — E05.00 PROPTOSIS DUE TO THYROID DISORDER: ICD-10-CM

## 2024-12-03 DIAGNOSIS — E07.9 THYROID EYE DISEASE: Primary | ICD-10-CM

## 2024-12-03 DIAGNOSIS — H02.539 EYELID RETRACTION OR LAG: ICD-10-CM

## 2024-12-03 PROCEDURE — 92285 EXTERNAL OCULAR PHOTOGRAPHY: CPT | Performed by: OPHTHALMOLOGY

## 2024-12-03 PROCEDURE — 99214 OFFICE O/P EST MOD 30 MIN: CPT | Performed by: OPHTHALMOLOGY

## 2024-12-03 ASSESSMENT — VISUAL ACUITY
OD_PH_CC: 20/20
OS_CC: 20/20
OD_CC: 20/40
CORRECTION_TYPE: GLASSES
METHOD: SNELLEN - LINEAR

## 2024-12-03 ASSESSMENT — CONF VISUAL FIELD
OS_SUPERIOR_TEMPORAL_RESTRICTION: 3
OD_SUPERIOR_TEMPORAL_RESTRICTION: 3

## 2024-12-03 ASSESSMENT — TONOMETRY
OD_IOP_MMHG: 17
OS_IOP_MMHG: 17
IOP_METHOD: ICARE

## 2024-12-03 ASSESSMENT — LAGOPHTHALMOS
OD_LAGOPHTHALMOS: 0
OS_LAGOPHTHALMOS: 0

## 2024-12-03 ASSESSMENT — EXTERNAL EXAM - LEFT EYE: OS_EXAM: NORMAL

## 2024-12-03 ASSESSMENT — EXTERNAL EXAM - RIGHT EYE: OD_EXAM: NORMAL

## 2024-12-03 NOTE — TELEPHONE ENCOUNTER
Called patient to schedule surgery with Dr. Smith    Spoke with: Paulina    Date(s) of Surgery: 2-6-25    Patient aware of approximate arrival time: No at Pt to get a call a couple of days prior to surgery     Location of surgery: [facility]     Pre-Op H&P: [facility] at Dr. Torres     Informed patient that they need to call to schedule pre-op H&P within 30 days of surgery date: Yes      Post-Op Appt Dates:       Discussed with patient pre-op RN will call 2-3 days prior to surgery with arrival time and instructions:  Yes       Standard Surgery Packet Sent: Yes 12/03/24  via [facility] ???? Message      Additional Information Sent in Packet:          Informed patient that they will need an adult  to bring patient home from surgery: Yes  :          Additional Comments:        All patients questions were answered and was instructed to review surgical packet and call back [phone] with any questions or concerns.       Maryjane Saucedo on 12/3/2024 at 4:18 PM

## 2024-12-03 NOTE — PROGRESS NOTES
Chief Complaint(s) and History of Present Illness(es)     Consult For            Laterality: both eyes    Treatments tried: artificial tears    Pain scale: 3/10    Comments: Rd Hyman is being seen for a consult today by the request   of Dr. Salazar treating for KATHRYN/ Evaluation both upper lids          Comments    Status post radiation therapy 6 months ago for active thyroid eye disease.   Dr Salazar suggested that once inactive may benefit from eyelid   surgery.  Patient reports that she is still taking medication for Thyroid but has   waited for swelling of lids to go down and this still exists each eye.   Here today to see if something can be done to reduce the swelling of both   upper lids.   Both upper lids are sore and feel tight each eye.  The swelling pushes   lashes into each eye. Bothered more by right eye than left eye. Has tried   to pull some lashes intermittent. Just pulled some lashes Yesterday   morning.   Denies having any double vision or blurry vision. Not sure the lid   swelling is impacting vision.     Kianna Ponce, COT COT 10:39 AM December 3, 2024     Thyroid history:  Diagnosed when?  2024  STEWARD: None  Thyroidectomy: None     Started Methimazole in 5/2024      TSI           Lab Results   Component Value Date/Time     TSIS 3.1 (H) 05/21/2024 11:25 AM        TYPE OF RADIATION THERAPY ADMINISTERED:  Radiation therapy to her bilateral eyes with a total dose of 2000 cGy in 10 treatments given from 6/27/2024 - 7/11/2024.      EBONI of 2 for lid edema and erythema (though quite mild now)  Assessment & Plan     Rd Hyman is a 49 year old female with the following diagnoses:   Encounter Diagnoses   Name Primary?    Thyroid eye disease Yes    Proptosis due to thyroid disorder     Eyelid retraction or lag      Entering inactive phase, stable measurements post orbital radiation.   Her main symptoms are proptosis, lid retraction causing exposure keratopathy and foreign body sensation, and  significant fullness of the eyelids, to the point the upper lid is pushing her eyelashes downwards and into her eye. She ends up plucking those lashes.    We discussed options.  Plan:  Bilateral lower orbital fat decompression  Bilateral upper eyelid retraction repair (Skin, NCF, check levator recession).   1 hour GA.            Attending Physician Attestation: Complete documentation of historical and exam elements from today's encounter can be found in the full encounter summary report (not reduplicated in this progress note). I personally obtained the chief complaint(s) and history of present illness. I confirmed and edited as necessary the review of systems, past medical/surgical history, family history, social history, and examination findings as documented by others; and I examined the patient myself. I personally reviewed the relevant tests, images, and reports as documented above. I formulated and edited as necessary the assessment and plan and discussed the findings and management plan with the patient.  -Will Smith MD    Today with Rd Hyman  and her , I reviewed the indications, risks, benefits, and alternatives of the proposed surgical procedure including, but not limited to, failure obtain the desired result  and need for additional surgery, bleeding, infection, injury to the eye, double vision, change in pupil size, I quoted recurrence of Thyroid eye disease at about 15 percent, and the remote possibility of permanent damage to any organ system or death with the use of anesthesia.  I provided multiple opportunities for the questions, answered all questions to the best of my ability, and confirmed that my answers and my discussion were understood.   Will Smith MD

## 2024-12-03 NOTE — NURSING NOTE
Chief Complaints and History of Present Illnesses   Patient presents with    Consult For     Rd Hyman is being seen for a consult today by the request of Dr. Salazar treating for KATHRYN/ Evaluation both upper lids     Chief Complaint(s) and History of Present Illness(es)       Consult For              Laterality: both eyes    Treatments tried: artificial tears    Pain scale: 3/10    Comments: Rd Hyman is being seen for a consult today by the request of Dr. Salazar treating for KATHRYN/ Evaluation both upper lids              Comments    Status post radiation therapy 6 months ago for active thyroid eye disease. Dr Salazar suggested that once inactive may benefit from eyelid surgery.  Patient reports that she is still taking medication for Thyroid but has waited for swelling of lids to go down and this still exists each eye.   Here today to see if something can be done to reduce the swelling of both upper lids.   Both upper lids are sore and feel tight each eye.  The swelling pushes lashes into each eye. Bothered more by right eye than left eye. Has tried to pull some lashes intermittent. Just pulled some lashes Yesterday morning.   Denies having any double vision or blurry vision. Not sure the lid swelling is impacting vision.     Kianna Ponce, COT COT 10:39 AM December 3, 2024

## 2024-12-29 DIAGNOSIS — R45.4 IRRITABILITY: ICD-10-CM

## 2024-12-30 RX ORDER — SERTRALINE HYDROCHLORIDE 25 MG/1
25 TABLET, FILM COATED ORAL DAILY
Qty: 90 TABLET | Refills: 1 | Status: SHIPPED | OUTPATIENT
Start: 2024-12-30

## 2025-01-14 ENCOUNTER — LAB (OUTPATIENT)
Dept: LAB | Facility: CLINIC | Age: 50
End: 2025-01-14
Payer: COMMERCIAL

## 2025-01-14 DIAGNOSIS — E05.90 THYROTOXICOSIS WITHOUT THYROID STORM, UNSPECIFIED THYROTOXICOSIS TYPE: ICD-10-CM

## 2025-01-14 LAB
T4 FREE SERPL-MCNC: 1.57 NG/DL (ref 0.9–1.7)
TSH SERPL DL<=0.005 MIU/L-ACNC: 0.02 UIU/ML (ref 0.3–4.2)

## 2025-01-14 PROCEDURE — 36415 COLL VENOUS BLD VENIPUNCTURE: CPT

## 2025-01-14 PROCEDURE — 84439 ASSAY OF FREE THYROXINE: CPT

## 2025-01-14 PROCEDURE — 84443 ASSAY THYROID STIM HORMONE: CPT

## 2025-01-15 DIAGNOSIS — E05.90 THYROTOXICOSIS WITHOUT THYROID STORM, UNSPECIFIED THYROTOXICOSIS TYPE: ICD-10-CM

## 2025-01-15 RX ORDER — METHIMAZOLE 5 MG/1
5 TABLET ORAL DAILY
Qty: 90 TABLET | Refills: 1 | Status: SHIPPED | OUTPATIENT
Start: 2025-01-15

## 2025-01-15 NOTE — RESULT ENCOUNTER NOTE
Hello -    Here are my comments about the recent results: T4 is normal. TSH takes longer to recover. Can continue current dose of methimazole, recheck labs in 3 months.    Regards,   Danilo Ramirez MD

## 2025-01-28 ENCOUNTER — OFFICE VISIT (OUTPATIENT)
Dept: FAMILY MEDICINE | Facility: CLINIC | Age: 50
End: 2025-01-28
Payer: COMMERCIAL

## 2025-01-28 VITALS
OXYGEN SATURATION: 97 % | BODY MASS INDEX: 28.4 KG/M2 | HEIGHT: 61 IN | WEIGHT: 150.4 LBS | RESPIRATION RATE: 16 BRPM | HEART RATE: 79 BPM | DIASTOLIC BLOOD PRESSURE: 82 MMHG | TEMPERATURE: 97.7 F | SYSTOLIC BLOOD PRESSURE: 115 MMHG

## 2025-01-28 DIAGNOSIS — E05.00 PROPTOSIS DUE TO THYROID DISORDER: ICD-10-CM

## 2025-01-28 DIAGNOSIS — E05.90 THYROTOXICOSIS WITHOUT THYROID STORM, UNSPECIFIED THYROTOXICOSIS TYPE: ICD-10-CM

## 2025-01-28 DIAGNOSIS — Z01.818 PREOP GENERAL PHYSICAL EXAM: Primary | ICD-10-CM

## 2025-01-28 DIAGNOSIS — R45.4 IRRITABILITY: ICD-10-CM

## 2025-01-28 LAB
ERYTHROCYTE [DISTWIDTH] IN BLOOD BY AUTOMATED COUNT: 12 % (ref 10–15)
HCG UR QL: NEGATIVE
HCT VFR BLD AUTO: 42.5 % (ref 35–47)
HGB BLD-MCNC: 14.1 G/DL (ref 11.7–15.7)
MCH RBC QN AUTO: 28.8 PG (ref 26.5–33)
MCHC RBC AUTO-ENTMCNC: 33.2 G/DL (ref 31.5–36.5)
MCV RBC AUTO: 87 FL (ref 78–100)
PLATELET # BLD AUTO: 204 10E3/UL (ref 150–450)
RBC # BLD AUTO: 4.89 10E6/UL (ref 3.8–5.2)
WBC # BLD AUTO: 8.6 10E3/UL (ref 4–11)

## 2025-01-28 PROCEDURE — 99214 OFFICE O/P EST MOD 30 MIN: CPT | Mod: 25 | Performed by: FAMILY MEDICINE

## 2025-01-28 PROCEDURE — 36415 COLL VENOUS BLD VENIPUNCTURE: CPT | Performed by: FAMILY MEDICINE

## 2025-01-28 PROCEDURE — 90715 TDAP VACCINE 7 YRS/> IM: CPT | Performed by: FAMILY MEDICINE

## 2025-01-28 PROCEDURE — 85027 COMPLETE CBC AUTOMATED: CPT | Performed by: FAMILY MEDICINE

## 2025-01-28 PROCEDURE — 90471 IMMUNIZATION ADMIN: CPT | Performed by: FAMILY MEDICINE

## 2025-01-28 PROCEDURE — 90656 IIV3 VACC NO PRSV 0.5 ML IM: CPT | Performed by: FAMILY MEDICINE

## 2025-01-28 PROCEDURE — 90472 IMMUNIZATION ADMIN EACH ADD: CPT | Performed by: FAMILY MEDICINE

## 2025-01-28 PROCEDURE — 80048 BASIC METABOLIC PNL TOTAL CA: CPT | Performed by: FAMILY MEDICINE

## 2025-01-28 PROCEDURE — 81025 URINE PREGNANCY TEST: CPT | Performed by: FAMILY MEDICINE

## 2025-01-28 RX ORDER — SERTRALINE HYDROCHLORIDE 25 MG/1
25 TABLET, FILM COATED ORAL DAILY
Qty: 90 TABLET | Refills: 1 | Status: CANCELLED | OUTPATIENT
Start: 2025-01-28

## 2025-01-28 NOTE — PROGRESS NOTES
Preoperative Evaluation  [facility]  [address] [facility] [address]  Phone: [phone]  Fax: [phone]  Primary Provider: Kellie Torres MD  Pre-op Performing Provider: Kellie Torres MD  Jan 28, 2025 1/23/2025   Surgical Information   What procedure is being done? Bilateral lower orbital fat decompression   Facility or Hospital where procedure/surgery will be performed: [facility] [place]   Who is doing the procedure / surgery? Will Smith   Date of surgery / procedure: Thursday February 06, 2025   Time of surgery / procedure: not know yet   Where do you plan to recover after surgery? at home with family     Fax number for surgical facility: Note does not need to be faxed, will be available electronically in Epic.    1. Preop general physical exam (Primary)  Paulina is approved for surgery with general anesthesia.  - CBC with platelets; Future  - Basic metabolic panel; Future  - HCG qualitative urine; Future    2. Proptosis due to thyroid disorder      3. 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    4. Irritability  When I last saw her, she started on low-dose of sertraline and she feels like it has been helpful.  She does not feel like she needs to go up on the dose today.      Vane Gallardo is a 49 year old, presenting for the following:  Pre-Op Exam          1/28/2025    12:43 PM   Additional Questions   Roomed by [facility] related to upcoming procedure:   She comes in today for preop physical.  She has been following with endocrinology for thyrotoxicosis/Graves' disease.  She is now having surgery on her eyes.  She has had anesthesia in the past without any issues.  She has no known coronary artery disease and denies chest pain or shortness of breath.  She said no recent illnesses or exposures.  She is stable on her  current dose of sertraline.        1/23/2025   Pre-Op Questionnaire   Have you ever had a heart attack or stroke? No   Have you ever had surgery on your heart or blood vessels, such as a stent placement, a coronary artery bypass, or surgery on an artery in your head, neck, heart, or legs? No   Do you have chest pain with activity? No   Do you have a history of heart failure? No   Do you currently have a cold, bronchitis or symptoms of other infection? No   Do you have a cough, shortness of breath, or wheezing? No   Do you or anyone in your family have previous history of blood clots? No   Do you or does anyone in your family have a serious bleeding problem such as prolonged bleeding following surgeries or cuts? No   Have you ever had problems with anemia or been told to take iron pills? No   Have you had any abnormal blood loss such as black, tarry or bloody stools, or abnormal vaginal bleeding? No   Have you ever had a blood transfusion? (!) YES - postpartum hem   Have you ever had a transfusion reaction? No   Are you willing to have a blood transfusion if it is medically needed before, during, or after your surgery? Yes   Have you or any of your relatives ever had problems with anesthesia? No   Do you have sleep apnea, excessive snoring or daytime drowsiness? No   Do you have any artifical heart valves or other implanted medical devices like a pacemaker, defibrillator, or continuous glucose monitor? No   Do you have artificial joints? No   Are you allergic to latex? No     Health Care Directive  Patient does not have a Health Care Directive: Discussed advance care planning with patient; information given to patient to review.    Preoperative Review of    reviewed - no record of controlled substances prescribed.          Patient Active Problem List    Diagnosis Date Noted    Proptosis due to thyroid disorder 12/03/2024     Priority: Medium    Eyelid retraction or lag 12/03/2024     Priority: Medium    Thyroid  "eye disease 06/06/2024     Priority: Medium    Thyrotoxicosis without thyroid storm, unspecified thyrotoxicosis type 04/30/2024     Priority: Medium    Hypercalcemia 04/30/2024     Priority: Medium    Hypokalemia 04/30/2024     Priority: Medium      Past Medical History:   Diagnosis Date    Graves disease     Infertility     Thyroid eye disease      Past Surgical History:   Procedure Laterality Date    DILATION AND CURETTAGE  06/10/2014    DILATION AND CURETTAGE N/A 6/10/2014    Procedure: DILATION AND CURETTAGE;  Surgeon: Toni Shepard MD;  [place] [facility] [place]  Service:      Current Outpatient Medications   Medication Sig Dispense Refill    Cholecalciferol (VITAMIN D-3) 25 MCG (1000 UT) CAPS       ibuprofen (ADVIL/MOTRIN) 600 MG tablet Take 1 tablet (600 mg) by mouth every 8 hours as needed for moderate pain 30 tablet 0    loratadine (CLARITIN) 10 MG tablet Take 10 mg by mouth daily      methimazole (TAPAZOLE) 5 MG tablet Take 1 tablet (5 mg) by mouth daily. 90 tablet 1    multivitamin w/minerals (THERA-VIT-M) tablet Take by mouth daily      sertraline (ZOLOFT) 25 MG tablet TAKE 1 TABLET(25 MG) BY MOUTH DAILY 90 tablet 1    triamcinolone (KENALOG) 0.1 % external cream Apply topically 2 times daily         Allergies   Allergen Reactions    Cat Dander Other (See Comments)    Dust Mites         Social History     Tobacco Use    Smoking status: Never     Passive exposure: Current ( smokes outside)    Smokeless tobacco: Never   Substance Use Topics    Alcohol use: Not Currently       History   Drug Use Unknown             Review of Systems  Constitutional, HEENT, cardiovascular, pulmonary, gi and gu systems are negative, except as otherwise noted.    Objective    /82   Pulse 79   Temp 97.7  F (36.5  C)   Resp 16   Ht 1.549 m (5' 1\")   Wt 68.2 kg (150 lb 6.4 oz)   LMP 12/05/2024   SpO2 97%   BMI 28.42 kg/m     Estimated body mass index is 28.42 kg/m  as calculated from the following:    " "Height as of this encounter: 1.549 m (5' 1\").    Weight as of this encounter: 68.2 kg (150 lb 6.4 oz).  Physical Exam  GENERAL: alert and no distress  EYES: Eyes grossly normal to inspection, PERRL and conjunctivae and sclerae normal  HENT: ear canals and TM's normal, nose and mouth without ulcers or lesions  NECK: no adenopathy, no asymmetry, masses, or scars  RESP: lungs clear to auscultation - no rales, rhonchi or wheezes  CV: regular rate and rhythm, normal S1 S2, no S3 or S4, no murmur, click or rub, no peripheral edema  ABDOMEN: soft, nontender, no hepatosplenomegaly, no masses and bowel sounds normal  MS: no gross musculoskeletal defects noted, no edema  SKIN: no suspicious lesions or rashes  NEURO: Normal strength and tone, mentation intact and speech normal  PSYCH: mentation appears normal, affect normal/bright    Recent Labs   Lab Test 11/12/24  1343 06/13/24  1149 05/21/24  1125 04/02/24  0343   HGB 13.6  --   --  12.6     --   --  142*   NA  --  140 141 141   POTASSIUM  --  3.8 3.6 3.2*   CR  --  0.44* 0.31* 0.37*        Diagnostics  Recent Results (from the past 48 hours)   CBC with platelets    Collection Time: 01/28/25  1:15 PM   Result Value Ref Range    WBC Count 8.6 4.0 - 11.0 10e3/uL    RBC Count 4.89 3.80 - 5.20 10e6/uL    Hemoglobin 14.1 11.7 - 15.7 g/dL    Hematocrit 42.5 35.0 - 47.0 %    MCV 87 78 - 100 fL    MCH 28.8 26.5 - 33.0 pg    MCHC 33.2 31.5 - 36.5 g/dL    RDW 12.0 10.0 - 15.0 %    Platelet Count 204 150 - 450 10e3/uL   HCG qualitative urine    Collection Time: 01/28/25  1:27 PM   Result Value Ref Range    hCG Urine Qualitative Negative Negative      No EKG required, no history of coronary heart disease, significant arrhythmia, peripheral arterial disease or other structural heart disease.    Revised Cardiac Risk Index (RCRI)  The patient has the following serious cardiovascular risks for perioperative complications:   - No serious cardiac risks = 0 points     RCRI " Interpretation: 0 points: Class I (very low risk - 0.4% complication rate)         Signed Electronically by: Kellie Torres MD  A copy of this evaluation report is provided to the requesting physician.

## 2025-01-28 NOTE — PROGRESS NOTES
Prior to immunization administration, verified patients identity using patient s name and date of birth. Please see Immunization Activity for additional information.     Screening Questionnaire for Adult Immunization    Are you sick today?   No   Do you have allergies to medications, food, a vaccine component or latex?   No   Have you ever had a serious reaction after receiving a vaccination?   No   Do you have a long-term health problem with heart, lung, kidney, or metabolic disease (e.g., diabetes), asthma, a blood disorder, no spleen, complement component deficiency, a cochlear implant, or a spinal fluid leak?  Are you on long-term aspirin therapy?   No   Do you have cancer, leukemia, HIV/AIDS, or any other immune system problem?   No   Do you have a parent, brother, or sister with an immune system problem?   No   In the past 3 months, have you taken medications that affect  your immune system, such as prednisone, other steroids, or anticancer drugs; drugs for the treatment of rheumatoid arthritis, Crohn s disease, or psoriasis; or have you had radiation treatments?   No   Have you had a seizure, or a brain or other nervous system problem?   No   During the past year, have you received a transfusion of blood or blood    products, or been given immune (gamma) globulin or antiviral drug?   No   For women: Are you pregnant or is there a chance you could become       pregnant during the next month?   No   Have you received any vaccinations in the past 4 weeks?   No     Immunization questionnaire answers were all negative.      Patient instructed to remain in clinic for 15 minutes afterwards, and to report any adverse reactions.     Screening performed by Rhea Gross MA on 1/28/2025 at 1:11 PM.

## 2025-01-29 LAB
ANION GAP SERPL CALCULATED.3IONS-SCNC: 11 MMOL/L (ref 7–15)
BUN SERPL-MCNC: 14.9 MG/DL (ref 6–20)
CALCIUM SERPL-MCNC: 9.5 MG/DL (ref 8.8–10.4)
CHLORIDE SERPL-SCNC: 103 MMOL/L (ref 98–107)
CREAT SERPL-MCNC: 0.71 MG/DL (ref 0.51–0.95)
EGFRCR SERPLBLD CKD-EPI 2021: >90 ML/MIN/1.73M2
GLUCOSE SERPL-MCNC: 92 MG/DL (ref 70–99)
HCO3 SERPL-SCNC: 27 MMOL/L (ref 22–29)
POTASSIUM SERPL-SCNC: 4.2 MMOL/L (ref 3.4–5.3)
SODIUM SERPL-SCNC: 141 MMOL/L (ref 135–145)

## 2025-02-05 ENCOUNTER — ANESTHESIA EVENT (OUTPATIENT)
Dept: SURGERY | Facility: AMBULATORY SURGERY CENTER | Age: 50
End: 2025-02-05
Payer: COMMERCIAL

## 2025-02-05 RX ORDER — NALOXONE HYDROCHLORIDE 0.4 MG/ML
0.1 INJECTION, SOLUTION INTRAMUSCULAR; INTRAVENOUS; SUBCUTANEOUS
OUTPATIENT
Start: 2025-02-05

## 2025-02-05 RX ORDER — OXYCODONE HYDROCHLORIDE 5 MG/1
10 TABLET ORAL
OUTPATIENT
Start: 2025-02-05

## 2025-02-05 RX ORDER — DEXAMETHASONE SODIUM PHOSPHATE 10 MG/ML
4 INJECTION, SOLUTION INTRAMUSCULAR; INTRAVENOUS
OUTPATIENT
Start: 2025-02-05

## 2025-02-05 RX ORDER — ONDANSETRON 4 MG/1
4 TABLET, ORALLY DISINTEGRATING ORAL EVERY 30 MIN PRN
OUTPATIENT
Start: 2025-02-05

## 2025-02-05 RX ORDER — ONDANSETRON 2 MG/ML
4 INJECTION INTRAMUSCULAR; INTRAVENOUS EVERY 30 MIN PRN
OUTPATIENT
Start: 2025-02-05

## 2025-02-06 ENCOUNTER — HOSPITAL ENCOUNTER (OUTPATIENT)
Facility: AMBULATORY SURGERY CENTER | Age: 50
End: 2025-02-06
Attending: OPHTHALMOLOGY
Payer: COMMERCIAL

## 2025-02-06 ENCOUNTER — ANESTHESIA (OUTPATIENT)
Dept: SURGERY | Facility: AMBULATORY SURGERY CENTER | Age: 50
End: 2025-02-06
Payer: COMMERCIAL

## 2025-02-06 VITALS
WEIGHT: 150 LBS | SYSTOLIC BLOOD PRESSURE: 145 MMHG | BODY MASS INDEX: 29.45 KG/M2 | RESPIRATION RATE: 14 BRPM | HEIGHT: 60 IN | TEMPERATURE: 98.4 F | HEART RATE: 61 BPM | DIASTOLIC BLOOD PRESSURE: 95 MMHG | OXYGEN SATURATION: 99 %

## 2025-02-06 DIAGNOSIS — H02.539 EYELID RETRACTION OR LAG: Primary | ICD-10-CM

## 2025-02-06 DIAGNOSIS — Z98.890 POSTOPERATIVE EYE STATE: ICD-10-CM

## 2025-02-06 DIAGNOSIS — E05.00 PROPTOSIS DUE TO THYROID DISORDER: ICD-10-CM

## 2025-02-06 RX ORDER — SODIUM CHLORIDE, SODIUM LACTATE, POTASSIUM CHLORIDE, CALCIUM CHLORIDE 600; 310; 30; 20 MG/100ML; MG/100ML; MG/100ML; MG/100ML
INJECTION, SOLUTION INTRAVENOUS CONTINUOUS
Status: DISCONTINUED | OUTPATIENT
Start: 2025-02-06 | End: 2025-02-06 | Stop reason: HOSPADM

## 2025-02-06 RX ORDER — TETRACAINE HYDROCHLORIDE 5 MG/ML
SOLUTION OPHTHALMIC PRN
Status: DISCONTINUED | OUTPATIENT
Start: 2025-02-06 | End: 2025-02-06 | Stop reason: HOSPADM

## 2025-02-06 RX ORDER — MAGNESIUM HYDROXIDE 1200 MG/15ML
LIQUID ORAL PRN
Status: DISCONTINUED | OUTPATIENT
Start: 2025-02-06 | End: 2025-02-06 | Stop reason: HOSPADM

## 2025-02-06 RX ORDER — ERYTHROMYCIN 5 MG/G
OINTMENT OPHTHALMIC PRN
Status: DISCONTINUED | OUTPATIENT
Start: 2025-02-06 | End: 2025-02-06 | Stop reason: HOSPADM

## 2025-02-06 RX ORDER — FENTANYL CITRATE 50 UG/ML
25 INJECTION, SOLUTION INTRAMUSCULAR; INTRAVENOUS EVERY 5 MIN PRN
Status: DISCONTINUED | OUTPATIENT
Start: 2025-02-06 | End: 2025-02-06 | Stop reason: HOSPADM

## 2025-02-06 RX ORDER — HYDROMORPHONE HYDROCHLORIDE 1 MG/ML
0.2 INJECTION, SOLUTION INTRAMUSCULAR; INTRAVENOUS; SUBCUTANEOUS EVERY 5 MIN PRN
Status: ACTIVE | OUTPATIENT
Start: 2025-02-06

## 2025-02-06 RX ORDER — LIDOCAINE 40 MG/G
CREAM TOPICAL
Status: DISCONTINUED | OUTPATIENT
Start: 2025-02-06 | End: 2025-02-06 | Stop reason: HOSPADM

## 2025-02-06 RX ORDER — DEXAMETHASONE SODIUM PHOSPHATE 10 MG/ML
4 INJECTION, SOLUTION INTRAMUSCULAR; INTRAVENOUS
Status: DISCONTINUED | OUTPATIENT
Start: 2025-02-06 | End: 2025-02-06 | Stop reason: HOSPADM

## 2025-02-06 RX ORDER — PROPOFOL 10 MG/ML
INJECTION, EMULSION INTRAVENOUS CONTINUOUS PRN
Status: DISCONTINUED | OUTPATIENT
Start: 2025-02-06 | End: 2025-02-06

## 2025-02-06 RX ORDER — PROPOFOL 10 MG/ML
INJECTION, EMULSION INTRAVENOUS PRN
Status: DISCONTINUED | OUTPATIENT
Start: 2025-02-06 | End: 2025-02-06

## 2025-02-06 RX ORDER — DEXAMETHASONE SODIUM PHOSPHATE 4 MG/ML
INJECTION, SOLUTION INTRA-ARTICULAR; INTRALESIONAL; INTRAMUSCULAR; INTRAVENOUS; SOFT TISSUE PRN
Status: DISCONTINUED | OUTPATIENT
Start: 2025-02-06 | End: 2025-02-06

## 2025-02-06 RX ORDER — FENTANYL CITRATE 50 UG/ML
INJECTION, SOLUTION INTRAMUSCULAR; INTRAVENOUS PRN
Status: DISCONTINUED | OUTPATIENT
Start: 2025-02-06 | End: 2025-02-06

## 2025-02-06 RX ORDER — OXYCODONE HYDROCHLORIDE 5 MG/1
5 TABLET ORAL EVERY 6 HOURS PRN
Qty: 15 TABLET | Refills: 0 | Status: SHIPPED | OUTPATIENT
Start: 2025-02-06 | End: 2025-02-09

## 2025-02-06 RX ORDER — ONDANSETRON 2 MG/ML
4 INJECTION INTRAMUSCULAR; INTRAVENOUS EVERY 30 MIN PRN
Status: DISCONTINUED | OUTPATIENT
Start: 2025-02-06 | End: 2025-02-06 | Stop reason: HOSPADM

## 2025-02-06 RX ORDER — NALOXONE HYDROCHLORIDE 0.4 MG/ML
0.1 INJECTION, SOLUTION INTRAMUSCULAR; INTRAVENOUS; SUBCUTANEOUS
Status: DISCONTINUED | OUTPATIENT
Start: 2025-02-06 | End: 2025-02-06 | Stop reason: HOSPADM

## 2025-02-06 RX ORDER — ONDANSETRON 4 MG/1
4 TABLET, ORALLY DISINTEGRATING ORAL EVERY 30 MIN PRN
Status: DISCONTINUED | OUTPATIENT
Start: 2025-02-06 | End: 2025-02-06 | Stop reason: HOSPADM

## 2025-02-06 RX ORDER — HYDROMORPHONE HYDROCHLORIDE 1 MG/ML
0.4 INJECTION, SOLUTION INTRAMUSCULAR; INTRAVENOUS; SUBCUTANEOUS EVERY 5 MIN PRN
Status: DISCONTINUED | OUTPATIENT
Start: 2025-02-06 | End: 2025-02-06 | Stop reason: HOSPADM

## 2025-02-06 RX ORDER — FENTANYL CITRATE 50 UG/ML
50 INJECTION, SOLUTION INTRAMUSCULAR; INTRAVENOUS EVERY 5 MIN PRN
Status: DISCONTINUED | OUTPATIENT
Start: 2025-02-06 | End: 2025-02-06 | Stop reason: HOSPADM

## 2025-02-06 RX ORDER — ERYTHROMYCIN 5 MG/G
OINTMENT OPHTHALMIC
Qty: 3.5 G | Refills: 0 | Status: SHIPPED | OUTPATIENT
Start: 2025-02-06

## 2025-02-06 RX ORDER — HYDROMORPHONE HYDROCHLORIDE 1 MG/ML
0.2 INJECTION, SOLUTION INTRAMUSCULAR; INTRAVENOUS; SUBCUTANEOUS EVERY 5 MIN PRN
Status: DISCONTINUED | OUTPATIENT
Start: 2025-02-06 | End: 2025-02-06 | Stop reason: HOSPADM

## 2025-02-06 RX ORDER — FENTANYL CITRATE 50 UG/ML
25 INJECTION, SOLUTION INTRAMUSCULAR; INTRAVENOUS EVERY 5 MIN PRN
Status: ACTIVE | OUTPATIENT
Start: 2025-02-06

## 2025-02-06 RX ORDER — OXYCODONE HYDROCHLORIDE 5 MG/1
5 TABLET ORAL
Status: COMPLETED | OUTPATIENT
Start: 2025-02-06 | End: 2025-02-06

## 2025-02-06 RX ORDER — NEOMYCIN POLYMYXIN B SULFATES AND DEXAMETHASONE 3.5; 10000; 1 MG/ML; [USP'U]/ML; MG/ML
SUSPENSION/ DROPS OPHTHALMIC
Qty: 5 ML | Refills: 0 | Status: SHIPPED | OUTPATIENT
Start: 2025-02-06

## 2025-02-06 RX ORDER — ONDANSETRON 2 MG/ML
INJECTION INTRAMUSCULAR; INTRAVENOUS PRN
Status: DISCONTINUED | OUTPATIENT
Start: 2025-02-06 | End: 2025-02-06

## 2025-02-06 RX ORDER — ACETAMINOPHEN 325 MG/1
975 TABLET ORAL ONCE
Status: COMPLETED | OUTPATIENT
Start: 2025-02-06 | End: 2025-02-06

## 2025-02-06 RX ORDER — LIDOCAINE HYDROCHLORIDE 20 MG/ML
INJECTION, SOLUTION INFILTRATION; PERINEURAL PRN
Status: DISCONTINUED | OUTPATIENT
Start: 2025-02-06 | End: 2025-02-06

## 2025-02-06 RX ADMIN — HYDROMORPHONE HYDROCHLORIDE 0.2 MG: 1 INJECTION, SOLUTION INTRAMUSCULAR; INTRAVENOUS; SUBCUTANEOUS at 14:45

## 2025-02-06 RX ADMIN — OXYCODONE HYDROCHLORIDE 5 MG: 5 TABLET ORAL at 14:50

## 2025-02-06 RX ADMIN — ONDANSETRON 4 MG: 2 INJECTION INTRAMUSCULAR; INTRAVENOUS at 13:01

## 2025-02-06 RX ADMIN — LIDOCAINE HYDROCHLORIDE 60 MG: 20 INJECTION, SOLUTION INFILTRATION; PERINEURAL at 12:56

## 2025-02-06 RX ADMIN — FENTANYL CITRATE 25 MCG: 50 INJECTION, SOLUTION INTRAMUSCULAR; INTRAVENOUS at 13:29

## 2025-02-06 RX ADMIN — FENTANYL CITRATE 25 MCG: 50 INJECTION, SOLUTION INTRAMUSCULAR; INTRAVENOUS at 14:34

## 2025-02-06 RX ADMIN — SODIUM CHLORIDE, SODIUM LACTATE, POTASSIUM CHLORIDE, CALCIUM CHLORIDE: 600; 310; 30; 20 INJECTION, SOLUTION INTRAVENOUS at 12:50

## 2025-02-06 RX ADMIN — FENTANYL CITRATE 25 MCG: 50 INJECTION, SOLUTION INTRAMUSCULAR; INTRAVENOUS at 14:28

## 2025-02-06 RX ADMIN — FENTANYL CITRATE 25 MCG: 50 INJECTION, SOLUTION INTRAMUSCULAR; INTRAVENOUS at 13:36

## 2025-02-06 RX ADMIN — PROPOFOL 200 MG: 10 INJECTION, EMULSION INTRAVENOUS at 12:56

## 2025-02-06 RX ADMIN — ACETAMINOPHEN 975 MG: 325 TABLET ORAL at 11:36

## 2025-02-06 RX ADMIN — DEXAMETHASONE SODIUM PHOSPHATE 4 MG: 4 INJECTION, SOLUTION INTRA-ARTICULAR; INTRALESIONAL; INTRAMUSCULAR; INTRAVENOUS; SOFT TISSUE at 13:01

## 2025-02-06 RX ADMIN — PROPOFOL 150 MCG/KG/MIN: 10 INJECTION, EMULSION INTRAVENOUS at 12:56

## 2025-02-06 RX ADMIN — FENTANYL CITRATE 25 MCG: 50 INJECTION, SOLUTION INTRAMUSCULAR; INTRAVENOUS at 12:54

## 2025-02-06 RX ADMIN — FENTANYL CITRATE 25 MCG: 50 INJECTION, SOLUTION INTRAMUSCULAR; INTRAVENOUS at 13:13

## 2025-02-06 RX ADMIN — HYDROMORPHONE HYDROCHLORIDE 0.2 MG: 1 INJECTION, SOLUTION INTRAMUSCULAR; INTRAVENOUS; SUBCUTANEOUS at 14:39

## 2025-02-06 NOTE — DISCHARGE INSTRUCTIONS
Post-operative Instructions  Ophthalmic Plastic and Reconstructive Surgery    Will Smith M.D.     All instructions apply to the operated eye(s) or eyelid(s).    Wound care and personal care  ? Apply ice compresses and gentle pressure 15 minutes on, 15 minutes off, for 2 days. If you are sleeping, you don't need to wake up to ice. As long as there is no further bleeding, after two days, switch to warm water compresses for five minutes, four times a day until seen by your physician.   ? You may shower or wash your hair the day after surgery. Do not go swimming for at least 2 weeks to prevent contamination of your wounds.  ? You may go for walks and light activity is ok, but no heavy (over 15 pounds) lifting, bending or excessive straining for one week.   ? Do not apply make-up to the eyes or eyelids for 2 weeks after surgery.  ? Expect some swelling, bruising, black eye (even into the lower eyelids and cheeks). Also expect serum caking, crusting and discharge from the eye and/or incisions. A small amount of surface bleeding, and depending on the type of surgery, bleeding from the inside of the eyelid, is normal for the first 48 hours.  ? Avoid straining, bending at the waist, or lifting more than 15 pounds for 1 week. Sleeping with your head elevated, such as in a recliner, for the first several days can help swelling resolve more quickly.   ? Do continue to ambulate (walk) as you normally would - being sedentary after surgery can cause blood clots.   ? Your eye(s) and eyelid(s) may be painful and tender. This is normal after surgery.      Contact information and follow-up  ? Return to the Eye Clinic for a follow-up appointment with your physician as scheduled. If no appointment has been scheduled:   - [facility] eye clinic: [phone] for an appointment with Dr. Smith within 2 to 3 weeks from your date of surgery.    After hours or on weekends and holidays, call [phone] and ask to  speak with the ophthalmologist on call.    An on call person can be reached after hours for concerns. The on call doctor should not call in medication refill requests after hours or on weekends, so please plan accordingly. An effort has been made to provide adequate pain medications following every surgery, and refills will not be provided in most instances.     Medications  ? Restart all regular home medications and eye drops. If you take Plavix or Aspirin on a regular basis, wait for 72 hours after your surgery before restarting these in order to decrease the risk of bleeding complications.  ? Avoid aspirin and aspirin-like medications (Motrin, Aleve, Ibuprofen, Sanjuanita-Monroe etc) for 72 hours to reduce the risk of bleeding. You may take Tylenol (acetaminophen) for pain.  ? In addition to your home medications, take the following post-operative medications as prescribed by your physician.    ? Apply antibiotic ointment to all sutures three times a day, and into the operated eye(s) at night.   Once you run out, you can apply Vaseline or Aquaphor (over the counter) to the incisions. Don't put the Vaseline or Aquaphor into your eyes.   ? Instill prescribed eye drops 3 times a day for 10 days.   ? If you have ocular irritation, you can use over the counter artificial tears such as Refresh, Systane, or Blink. Do not use Visine, Clear Eyes, or any other drop that gets the red out.   ? Take antibiotic capsules or tablets as directed.  ? In many cases, postoperative discomfort can be managed with Tylenol alone. If narcotic pain medication was prescribed, take pain pills at prescribed frequency as needed for pain.    WARNING:   ? Prescribed narcotic medications may make you drowsy. You must not drive a car, operate heavy machinery or drink alcohol while taking them.  ? Prescribed narcotic pain medications may cause constipation and nausea.       [facility]  Home Care Following  "Anesthesia  For 24 hours after surgery:  Get plenty of rest.  A responsible adult must stay with you for at least 24 hours after you leave the surgery center.  Do not drive or use heavy equipment.  If you have weakness or tingling, don't drive or use heavy equipment until this feeling goes away.   Do not drink alcohol.   Avoid strenuous or risky activities.  Ask for help when climbing stairs.  You may feel lightheaded.  IF so, sit for a few minutes before standing.  Have someone help you get up.   If you have nausea (feel sick to your stomach): Drink only clear liquids such as apple juice, ginger ale, broth or 7-Up.  Rest may also help.  Be sure to drink enough fluids.  Move to a regular diet as you feel able.   You may have a slight fever.  Call the doctor if your fever is over 100 F (37.7 C) (taken under the tongue) or lasts longer than 24 hours.  You may have a dry mouth, a sore throat, muscle aches or trouble sleeping. These should go away after 24 hours.  Do not make important or legal decisions.   It is recommended to avoid smoking.        Today you received a Marcaine or bupivacaine block to numb the nerves near your surgery site.  This is a block using local anesthetic or \"numbing\" medication injected around the nerves to anesthetize or \"numb\" the area supplied by those nerves.  This block is injected into the muscle layer near your surgical site.  The medication may numb the location where you had surgery for 6-18 hours, but may last up to 24 hours.  If your surgical site is an arm or leg you should be careful with your affected limb, since it is possible to injure your limb without being aware of it due to the numbing.  Until full feeling returns, you should guard against bumping or hitting your limb, and avoid extreme hot or cold temperatures on the skin.  As the block wears off, the feeling will return as a tingling or prickly sensation near your surgical site.  You will experience more discomfort from " your incision as the feeling returns.  You may want to take a pain pill (a narcotic or Tylenol if this was prescribed by your surgeon) when you start to experience mild pain before the pain beccomes more severe.  If your pain medications do not control your pain you should notifiy your surgeon.    Tips for taking pain medications  To get the best pain relief possible, remember these points:  Take pain medications as directed, before pain becomes severe.  Pain medication can upset your stomach: taking it with food may help.  Constipation is a common side effect of pain medication. Drink plenty of  fluids.  Eat foods high in fiber. Take a stool softener if recommended by your doctor or pharmacist.  Do not drink alcohol, drive or operate machinery while taking pain medications.  Ask about other ways to control pain, such as with heat, ice or relaxation.    Tylenol/Acetaminophen Consumption    If you feel your pain relief is insufficient, you may take Tylenol/Acetaminophen in addition to your narcotic pain medication.   Be careful not to exceed 4,000 mg of Tylenol/Acetaminophen in a 24 hour period from all sources.  If you are taking extra strength Tylenol/acetaminophen (500 mg), the maximum dose is 8 tablets in 24 hours.  If you are taking regular strength acetaminophen (325 mg), the maximum dose is 12 tablets in 24 hours.    Call a doctor for any of the following:  Signs of infection (fever, growing tenderness at the surgery site, a large amount of drainage or bleeding, severe pain, foul-smelling drainage, redness, swelling).  It has been over 8 to 10 hours since surgery and you are still not able to urinate (pass water).  Headache for over 24 hours.  Numbness, tingling or weakness the day after surgery (if you had spinal anesthesia).  Signs of Covid-19 infection (temperature over 100 degrees, shortness of breath, cough, loss of taste/smell, generalized body aches, persistent headache, chills, sore throat,  nausea/vomiting/diarrhea)    Your doctor is:       Dr. Will Smith, Ophthalmology: [phone]               After hours and weekends call the hospital @ [phone] and ask for the resident on call for:  Ophthalmology  For emergency care, call the:  [facility]:  [phone] (TTY for hearing impaired: [phone])

## 2025-02-06 NOTE — ANESTHESIA POSTPROCEDURE EVALUATION
Patient: Rd Hyman    Procedure: Procedure(s):  Both upper eyelid retraction repair  Bilateral lower orbital fat decompression       Anesthesia Type:  General    Note:  Disposition: Outpatient   Postop Pain Control: Uneventful            Sign Out: Well controlled pain   PONV: No   Neuro/Psych: Uneventful            Sign Out: Acceptable/Baseline neuro status   Airway/Respiratory: Uneventful            Sign Out: Acceptable/Baseline resp. status   CV/Hemodynamics: Uneventful            Sign Out: Acceptable CV status   Other NRE: NONE   DID A NON-ROUTINE EVENT OCCUR? No           Last vitals:  Vitals Value Taken Time   /92 02/06/25 1455   Temp 36.8  C (98.2  F) 02/06/25 1455   Pulse 69 02/06/25 1455   Resp 14 02/06/25 1455   SpO2 100 % 02/06/25 1455   Vitals shown include unfiled device data.    Electronically Signed By: Александр Adames MD  February 6, 2025  3:16 PM

## 2025-02-06 NOTE — ANESTHESIA CARE TRANSFER NOTE
Patient: Rd Hyman    Procedure: Procedure(s):  Both upper eyelid retraction repair  Bilateral lower orbital fat decompression       Diagnosis: Thyroid eye disease [H57.89, E07.9]  Proptosis due to thyroid disorder [E05.00]  Eyelid retraction or lag [H02.539]  Diagnosis Additional Information: No value filed.    Anesthesia Type:   General     Note:    Oropharynx: oropharynx clear of all foreign objects and spontaneously breathing  Level of Consciousness: awake  Oxygen Supplementation: face mask  Level of Supplemental Oxygen (L/min / FiO2): 6  Independent Airway: airway patency satisfactory and stable  Dentition: dentition unchanged  Vital Signs Stable: post-procedure vital signs reviewed and stable  Report to RN Given: handoff report given  Patient transferred to: PACU    Handoff Report: Identifed the Patient, Identified the Reponsible Provider, Reviewed the pertinent medical history, Discussed the surgical course, Reviewed Intra-OP anesthesia mangement and issues during anesthesia, Set expectations for post-procedure period and Allowed opportunity for questions and acknowledgement of understanding    Vitals:  Vitals Value Taken Time   /97 02/06/25 1415   Temp     Pulse 99 02/06/25 1415   Resp 18 02/06/25 1417   SpO2 100 % 02/06/25 1417   Vitals shown include unfiled device data.    Electronically Signed By: SENIA Martinez CRNA  February 6, 2025  2:18 PM

## 2025-02-06 NOTE — ANESTHESIA PREPROCEDURE EVALUATION
Anesthesia Pre-Procedure Evaluation    Patient: Rd Hyman   MRN: [id number] : 1975        Procedure : Procedure(s):  Both upper eyelid retraction repair  Bilateral lower orbital fat decompression          Past Medical History:   Diagnosis Date    Graves disease     Infertility     Thyroid eye disease       Past Surgical History:   Procedure Laterality Date    DILATION AND CURETTAGE  06/10/2014    DILATION AND CURETTAGE N/A 6/10/2014    Procedure: DILATION AND CURETTAGE;  Surgeon: Toni Shepard MD;  [place] [facility] [place]  Service:       Allergies   Allergen Reactions    Cat Dander Other (See Comments)    Dust Mites       Social History     Tobacco Use    Smoking status: Never     Passive exposure: Current ( smokes outside)    Smokeless tobacco: Never   Substance Use Topics    Alcohol use: Not Currently      Wt Readings from Last 1 Encounters:   25 68 kg (150 lb)        Anesthesia Evaluation            ROS/MED HX  ENT/Pulmonary:       Neurologic:       Cardiovascular:       METS/Exercise Tolerance:     Hematologic:       Musculoskeletal:       GI/Hepatic:       Renal/Genitourinary:       Endo: Comment: Graves disease      Psychiatric/Substance Use:       Infectious Disease:       Malignancy:       Other:            Physical Exam    Airway  airway exam normal      Mallampati: II   TM distance: > 3 FB   Neck ROM: full   Mouth opening: > 3 cm    Respiratory Devices and Support         Dental       (+) Completely normal teeth      Cardiovascular          Rhythm and rate: regular and normal     Pulmonary   pulmonary exam normal        breath sounds clear to auscultation           OUTSIDE LABS:  CBC:   Lab Results   Component Value Date    WBC 8.6 2025    WBC 4.7 2024    HGB 14.1 2025    HGB 13.6 2024    HCT 42.5 2025    HCT 40.2 2024     2025     2024     BMP:   Lab Results   Component Value Date     2025      "06/13/2024    POTASSIUM 4.2 01/28/2025    POTASSIUM 3.8 06/13/2024    CHLORIDE 103 01/28/2025    CHLORIDE 105 06/13/2024    CO2 27 01/28/2025    CO2 23 06/13/2024    BUN 14.9 01/28/2025    BUN 6.9 06/13/2024    CR 0.71 01/28/2025    CR 0.44 (L) 06/13/2024    GLC 92 01/28/2025     (H) 06/13/2024     COAGS: No results found for: \"PTT\", \"INR\", \"FIBR\"  POC:   Lab Results   Component Value Date    HCG Negative 01/28/2025    HCGS Negative 04/02/2024     HEPATIC:   Lab Results   Component Value Date    ALBUMIN 4.1 11/01/2024    PROTTOTAL 7.0 11/01/2024    ALT 17 11/01/2024    AST 25 11/01/2024    ALKPHOS 175 (H) 11/01/2024    BILITOTAL 0.5 11/01/2024     OTHER:   Lab Results   Component Value Date    A1C 5.7 07/28/2017    OUMAR 9.5 01/28/2025    PHOS 4.1 05/21/2024    LIPASE 27 05/08/2023    TSH 0.02 (L) 01/14/2025    T4 1.57 01/14/2025       Anesthesia Plan    ASA Status:  2    NPO Status:  NPO Appropriate    Anesthesia Type: General.     - Airway: LMA   Induction: Intravenous.   Maintenance: TIVA.        Consents    Anesthesia Plan(s) and associated risks, benefits, and realistic alternatives discussed. Questions answered and patient/representative(s) expressed understanding.     - Discussed:     - Discussed with:  Patient      - Extended Intubation/Ventilatory Support Discussed: No.      - Patient is DNR/DNI Status: No     Use of blood products discussed: No .     Postoperative Care    Pain management: IV analgesics, Oral pain medications, Multi-modal analgesia.   PONV prophylaxis: Ondansetron (or other 5HT-3), Background Propofol Infusion     Comments:               Александр Adames MD    I have reviewed the pertinent notes and labs in the chart from the past 30 days and (re)examined the patient.  Any updates or changes from those notes are reflected in this note.    Clinically Significant Risk Factors Present on Admission                             # Overweight: Estimated body mass index is 29.29 kg/m  as " calculated from the following:    Height as of this encounter: 1.524 m (5').    Weight as of this encounter: 68 kg (150 lb).

## 2025-02-06 NOTE — BRIEF OP NOTE
[facility] Brief Operative Note    Pre-operative diagnosis: Thyroid eye disease [H57.89, E07.9]  Proptosis due to thyroid disorder [E05.00]  Eyelid retraction or lag [H02.539]   Post-operative diagnosis Same as above   Procedure: Procedure(s):  Both upper eyelid retraction repair  Bilateral lower orbital fat decompression   Surgeon(s): Surgeons and Role:     * Will Smith MD - Primary     * Radha Sawant MD - Resident - Assisting   Estimated blood loss: * No values recorded between 2/6/2025  1:14 PM and 2/6/2025  2:13 PM *    Specimens: * No specimens in log *   Findings: As expected

## 2025-02-06 NOTE — OP NOTE
Oculoplastic Surgery Operative Note    PREOPERATIVE DIAGNOSIS:    1.  Thyroid eye disease  2.  Both upper eyelid retraction  3.  Bilateral proptosis     POSTOPERATIVE DIAGNOSIS:    1.  Thyroid eye disease  2.  Both upper eyelid retraction  3.  Bilateral proptosis    PROCEDURE:   1.  Bilateral orbital fat decompression  2.  Both upper eyelid retraction repair    ANESTHESIA: General With local infiltration of 2% Lidocaine with epinephrine and 0.5% Marcaine in 50:50 mixture    SURGEON:  Will Smith MD    ASSISTANT: Radha Sawant MD    ESTIMATED BLOOD LOSS: 3 mL    INDICATIONS:  Rd Hyman presented with a history of thyroid eye disease with resultant proptosis secondary to fat hypertrophy and eyelid retraction. Risks, benefits, and alternatives to the proposed procedure were discussed, and she elected to proceed.    PROCEDURE: Rd Hyman was brought to the operating room and placed supine on the operating table. Under general anesthesia the upper eyelid crease and skin was marked out with a marking pen.  The upper eyelid and the area of the lower transconjunctival fat decompression was infiltrated with local anesthetic as above.  She was prepped and draped in typical sterile fashion.  I first started with the decompression.  A transconjunctival incision was made in the right inferior fornix and dissection was carried down to the inferior orbital rim and the orbital septum was opened.  To help with the negative malar vector, some of the orbital fat was placed on a pedicle in a subperiosteal pocket in the area of the tear trough. With the orbital septum open orbital fat was debulked and the inferonasal central and lateral orbital fat compartments.  The same was then performed on the opposite side.    Attention was then directed to the right upper eyelid retraction.  15 blade was used to incise skin and skin removals with thermal cautery.  Nasal central orbital fat was conservatively debulked and the levator  aponeurosis was identified and recessed.  The same was then performed on the opposite side.  Skin closure was with 6-0 plain gut sutures.  To prevent lower eyelid retraction, a small stab incision was made lateral to the lateral canthal angle and a 5-0 Vicryl was used to imbricate the lateral canthal tendon and secured to the superior lateral orbital rim on both sides.  Erythromycin ophthalmic ointment was applied.  Steri-Strips were applied.  Rd Hyman tolerated the procedure well and left the operating room in stable condition.       Will Smith MD   This report was dictated using Dragon voice recognition software.

## 2025-02-06 NOTE — ANESTHESIA PROCEDURE NOTES
Airway       Patient location during procedure: OR  Staff -        CRNA: Svetlana Gamez APRN CRNA       Performed By: anesthesiologist  Consent for Airway        Urgency: elective  Indications and Patient Condition       Indications for airway management: shay-procedural and airway protection       Induction type:intravenous      Final Airway Details       Final airway type: supraglottic airway    Supraglottic Airway Details        Type: LMA       Brand: LMA Unique       LMA size: 4    Post intubation assessment        Placement verified by: capnometry, equal breath sounds and chest rise        Number of attempts at approach: 1       Secured with: tape       Ease of procedure: easy       Dentition: Intact

## 2025-02-25 ENCOUNTER — OFFICE VISIT (OUTPATIENT)
Dept: OPHTHALMOLOGY | Facility: CLINIC | Age: 50
End: 2025-02-25
Payer: COMMERCIAL

## 2025-02-25 DIAGNOSIS — H02.539 EYELID RETRACTION OR LAG: ICD-10-CM

## 2025-02-25 DIAGNOSIS — E07.9 THYROID EYE DISEASE: Primary | ICD-10-CM

## 2025-02-25 DIAGNOSIS — H57.89 THYROID EYE DISEASE: Primary | ICD-10-CM

## 2025-02-25 ASSESSMENT — MARGIN REFLEX DISTANCE
OS_MRD1: 4
OD_MRD1: 4

## 2025-02-25 ASSESSMENT — LAGOPHTHALMOS
OS_LAGOPHTHALMOS: 0
OD_LAGOPHTHALMOS: 0

## 2025-02-25 ASSESSMENT — EXTERNAL EXAM - RIGHT EYE: OD_EXAM: NORMAL

## 2025-02-25 ASSESSMENT — VISUAL ACUITY
METHOD: SNELLEN - LINEAR
OS_CC+: -1
OD_CC: 20/25
CORRECTION_TYPE: GLASSES
OS_CC: 20/25
OD_CC+: -2

## 2025-02-25 ASSESSMENT — TONOMETRY
OD_IOP_MMHG: 10
IOP_METHOD: ICARE
OS_IOP_MMHG: 13

## 2025-02-25 ASSESSMENT — EXTERNAL EXAM - LEFT EYE: OS_EXAM: NORMAL

## 2025-02-25 NOTE — NURSING NOTE
Chief Complaints and History of Present Illnesses   Patient presents with    Post Op (Ophthalmology) Both Eyes     Post Both upper eyelid retraction repair and Bilateral lower orbital fat decompression on 02/06/2025          Chief Complaint(s) and History of Present Illness(es)       Post Op (Ophthalmology) Both Eyes              Laterality: both eyes    Course: gradually improving    Associated symptoms: eye pain and swelling.  Negative for redness and tearing    Pain scale: 1/10    Comments: Post Both upper eyelid retraction repair and Bilateral lower orbital fat decompression on 02/06/2025                   Comments    She states that she is doing well with her lids.  She sometimes has some lid soreness and tight sensations.  The swelling is resolving.  She ran out of ointment last week.    Sydney Saldaña, COT 1:11 PM  February 25, 2025

## 2025-02-25 NOTE — PROGRESS NOTES
"Chief Complaint(s) and History of Present Illness(es)     Post Op (Ophthalmology) Both Eyes            Laterality: both eyes    Course: gradually improving    Associated symptoms: eye pain and swelling.  Negative for redness and   tearing    Pain scale: 1/10    Comments: Post Both upper eyelid retraction repair and Bilateral lower   orbital fat decompression on 02/06/2025               Comments    She states that she is doing well with her lids.  She sometimes has some   lid soreness and tight sensations.  The swelling is resolving.  She ran   out of ointment last week.    Sydneyjordin Saldaña, COT 1:11 PM  February 25, 2025 2/25/25: 3 weeks s/p both upper eyelid retraction repair and Bilateral lower  orbital fat decompression on 02/06/2025.  Doing well. Happy with outcome.       PLAN:  -Warm compresses BID to help with residual edema  -Follow up in 2 months      Patient Instructions   - Apply warm compresses for 1 minute two to three times a day until your bruising has resolved. You can continue this for one more month.   - Cool compresses can help with swelling and itching, you can alternate cool compresses with warm compresses if you find it helpful.  - Apply Aquaphor or Vaseline to the incision at bedtime until it is smooth.    - If you have symptoms of eye irritation, it is good to use over the counter artificial tears. Good brands include Refresh, Blink, and Systane. Do NOT get drops that are for \"red eyes.\"   - It is normal for the incision to appear raised, red, itch and have small lumps. You can gently massage any small bumps along the incision line. These can take up to six months to resolve.    Return in about 2 months (around 4/25/2025).      Attending Physician Attestation: Complete documentation of historical and exam elements from today's encounter can be found in the full encounter summary report (not reduplicated in this progress note). I personally obtained the chief complaint(s) and history of " present illness. I confirmed and edited as necessary the review of systems, past medical/surgical history, family history, social history, and examination findings as documented by others; and I examined the patient myself. I personally reviewed the relevant tests, images, and reports as documented above. I formulated and edited as necessary the assessment and plan and discussed the findings and management plan with the patient and family. I personally reviewed the ophthalmic test(s) associated with this encounter, agree with the interpretation(s) as documented by the resident/fellow, and have edited the corresponding report(s) as necessary. Will Smith MD

## 2025-04-28 ENCOUNTER — VIRTUAL VISIT (OUTPATIENT)
Dept: ENDOCRINOLOGY | Facility: CLINIC | Age: 50
End: 2025-04-28
Payer: COMMERCIAL

## 2025-04-28 DIAGNOSIS — E05.90 THYROTOXICOSIS WITHOUT THYROID STORM, UNSPECIFIED THYROTOXICOSIS TYPE: Primary | ICD-10-CM

## 2025-04-28 PROCEDURE — 1126F AMNT PAIN NOTED NONE PRSNT: CPT | Mod: 95 | Performed by: INTERNAL MEDICINE

## 2025-04-28 PROCEDURE — G2211 COMPLEX E/M VISIT ADD ON: HCPCS | Performed by: INTERNAL MEDICINE

## 2025-04-28 PROCEDURE — 98006 SYNCH AUDIO-VIDEO EST MOD 30: CPT | Mod: 24 | Performed by: INTERNAL MEDICINE

## 2025-04-28 NOTE — PROGRESS NOTES
Video-Visit Details    Type of service:  Video Visit  Video Start Time: 0933  Video End Time: 0942  Originating Location (pt. Location): [place], [place]  Distant Location (provider location):  Home  Platform used for Video Visit: Marjorie Ramirez MD      HISTORY OF PRESENT ILLNESS  Rd Hyman is a 49 year old female with  who is here for follow up.    Interval History  Overall feeling good, good energy level  s/p both upper eyelid retraction repair and Bilateral lower  orbital fat decompression on 02/06/2025, will have a Follow-up tomorrow  No eye symptoms  No problem swalllowing/ breathing/ voice change  No SE on MMI, currently at 5 mhg daily      Initial visit  Patient initially seen by Dr. Zayas and then by me.    She was diagnosed with COVID19 in 12/2023. After this she started to develop weight loss, her weight at that time was 168 lbs and now she is weighing 129 lbs. She feels colder than usual. She has been experiencing tremors, palpitations associated with shortness of breath. She also has noticed weakness most notable when she is climbing stairs. She has not noticed neck swelling..     Her eyelids have been swollen and with eye pain. No blurry vision or diplopia.     Since onset symptoms have been persistent.     Last month she presented to ER for evaluation of flank pain. Found to have a kidney stone. Recently her serum calcium was 10.8. She has no prior history of hypercalcemia.     REVIEW OF SYSTEMS  10 point negative except as mentioned in HPI    Past Medical/Surgical History:  Past Medical History:   Diagnosis Date    Graves disease     Infertility     Thyroid eye disease      Past Surgical History:   Procedure Laterality Date    DILATION AND CURETTAGE  06/10/2014    DILATION AND CURETTAGE N/A 6/10/2014    Procedure: DILATION AND CURETTAGE;  Surgeon: Toni Shepard MD;  Location: [facility] [place];  Service:     ORBITOTOMY DECOMPRESSION Bilateral 2/6/2025    Procedure: Bilateral lower orbital  fat decompression;  Surgeon: Will Smith MD;  Location: [facility] OR    REPAIR RETRACTION LID BILATERAL Bilateral 2/6/2025    Procedure: Both upper eyelid retraction repair;  Surgeon: Will Smith MD;  [place] [facility] [place]       Medications  Current Outpatient Medications   Medication Sig Dispense Refill    Cholecalciferol (VITAMIN D-3) 25 MCG (1000 UT) CAPS       ibuprofen (ADVIL/MOTRIN) 600 MG tablet Take 1 tablet (600 mg) by mouth every 8 hours as needed for moderate pain 30 tablet 0    methimazole (TAPAZOLE) 5 MG tablet Take 1 tablet (5 mg) by mouth daily. 90 tablet 1    multivitamin w/minerals (THERA-VIT-M) tablet Take by mouth daily      sertraline (ZOLOFT) 25 MG tablet Take 1 tablet (25 mg) by mouth daily. 90 tablet 1    triamcinolone (KENALOG) 0.1 % external cream Apply topically 2 times daily       No current facility-administered medications for this visit.       Allergies  Allergies   Allergen Reactions    Cat Dander Other (See Comments)    Dust Mites          Family History  family history is not on file.    Social History  Social History     Tobacco Use    Smoking status: Never     Passive exposure: Current ( smokes outside)    Smokeless tobacco: Never   Substance Use Topics    Alcohol use: Not Currently       Physical Exam  There were no vitals taken for this visit.  There is no height or weight on file to calculate BMI.  GENERAL :  In no apparent distress  RESP: Normal breathing  NEURO: awake, alert, responds appropriately to questions.      DATA REVIEW  Labs/Imaging     TSH   Date Value Ref Range Status   01/14/2025 0.02 (L) 0.30 - 4.20 uIU/mL Final      Free T4   Date Value Ref Range Status   01/14/2025 1.57 0.90 - 1.70 ng/dL Final      Thyrotropin Receptor Antibody   Date Value Ref Range Status   11/12/2024 2.69 (H) 0.00 - 1.75 IU/L Final     Comment:        -------------------ADDITIONAL INFORMATION-------------------  At a decision limit of 1.75 IU/L, this assay   has 97% sensitivity  and 99% specificity for   detection of Graves' disease. In healthy   individuals and in patients with thyroid disease   without diagnosis of Graves' disease, the upper   limit of anti-TSHR values are 1.22 IU/L and   1.58 IU/L, respectively (97.5th percentiles).     Test Performed by:  [facility]  [address] [place] [address]  : Lisa Fuller Ph.D.; CLIA# [id number]     AST   Date Value Ref Range Status   11/01/2024 25 0 - 45 U/L Final     ALT   Date Value Ref Range Status   11/01/2024 17 0 - 50 U/L Final     Alkaline Phosphatase   Date Value Ref Range Status   11/01/2024 175 (H) 40 - 150 U/L Final     WBC Count   Date Value Ref Range Status   01/28/2025 8.6 4.0 - 11.0 10e3/uL Final     EXAM: US THYROID  LOCATION: [facility]  DATE: 7/12/2024     INDICATION: worsening neck enlargement  COMPARISON: Thyroid ultrasound 3/25/2024  TECHNIQUE: Thyroid ultrasound.      FINDINGS:  RIGHT lobe: 6 x 2.4 x 3.4 cm, compared to 6.4 x 2.2 x 2.8 cm Homogeneous echotexture.   Isthmus: 7 mm.  LEFT lobe: 6.5 x 2.6 x 3.3 cm, compared to 6.7 x 2.4 x 2.7. Homogeneous echotexture. Several small parenchymal calcifications are present mid to low left thyroid lobe measuring up to 6 mm in greatest dimension.     NECK: No cervical lymphadenopathy.     NODULES:     Nodule 1: Coarse calcified nodule right upper lobe measures 7 x 8 x 7 mm, unchanged.   Composition: Solid or almost completely solid, 2 points   Echogenicity: Unable to determine, 1 point   Shape: Wider-than-tall, 0 points   Margin: Smooth, 0 points   Echogenic Foci: Macrocalcifications, 1 point   Point Total: 4-6 points. TI-RADS 4. If 1.5 cm or larger, recommend FNA; if 1 cm or larger, follow up US (annually for 5 years).      Nodule 2: Solid, hyperechoic nodule superior left thyroid measures 12 x 11 x 11 mm. In retrospect this nodule was present on the prior ultrasound and is  unchanged.  Composition: Solid or almost completely solid, 2 points   Echogenicity: Hyperechoic or isoechoic, 1 point   Shape: Not taller than wide, 0 points   Margin: Smooth, 0 points   Echogenic Foci: None, or large comet-tail artifacts, 0 points   Point Total: 3 points. TI-RADS 3. If 2.5 cm or larger, recommend FNA; if 1.5 cm or larger, recommend follow up US at 1, 3, and 5 years.                                                                      IMPRESSION:     Unchanged densely calcified right thyroid nodule and circumscribed hyperechoic left thyroid nodule. These are overall benign-appearing. No specific additional imaging workup or follow-up is suggested per TI RADS guidelines.    ASSESSMENT/PLAN:   ## Graves' disease, start MMI 5/23/2024  ## Graves' eye disease, s/p both upper eyelid retraction repair and Bilateral lower  orbital fat decompression on 02/06/2025  ## H/o kidney stone  ## Thyroid nodules, US 7/2024  Doing well on MMI 5 mg daily  - labs now and again 3 months  - thyroid US 3 months  - Follow-up 8/2025    Information for patients on Tapazole or Propylthiouracil (PTU)  The generic name for Tapazole is methimazole.      The drugs, Tapazole and PTU are used to treat an overactive thyroid (hyperthyroidism).      They prevent the thyroid from making too much thyroid hormone.  You can think of them as putting up a road block in your thyroid.    These drugs are usually well tolerated and safe, but rarely can cause serious side effects.  The two worst potential side-effects are:  agranulocytosis.  This is the loss of the white blood cells that fight infection.  This can occur in approximately 1 in 200 people.  liver problems.  This is even more rare than agranulocytosis but it can be very serious.    Because of the serious nature of the rare side-effects, you should notify your doctor if you develop the following symptoms while taking the drug:  Fever  sore throat  flu-like symptoms (nausea, vomiting,  diarrhea, aches, abdominal pain)    In addition, anytime you have evidence of infection, you should get a blood test to be sure that you do not have agranulocytosis.  A prescription will be provided to you to get this blood test as needed.  This is an extra precaution and the results should be available the same day the blood test is drawn.  If your blood count is OK (which it almost always is), then you may continue to take the antithyroid drug.    While taking this medication, your doctor will probably want to see you frequently, every 1-2 months, at least for a time.  This is important so that the response can be monitored and the dose can be adjusted.      If you have concerns you may reach us at [phone] during regular hours, and [phone] (ask for endocrinologist on call) after hours.      Orders Placed This Encounter   Procedures    TSH with free T4 reflex     The longitudinal plan of care for the diagnosis(es)/condition(s) as documented were addressed during this visit. Due to the added complexity in care, I will continue to support Paulina in the subsequent management and with ongoing continuity of care.

## 2025-04-28 NOTE — LETTER
4/28/2025      Rd Hyman  [address]  [place] [address]      Dear Colleague,    Thank you for referring your patient, Rd Hyman, to the [facility]. Please see a copy of my visit note below.    Video-Visit Details    Type of service:  Video Visit  Video Start Time: 0933  Video End Time: 0942  Originating Location (pt. Location): [place], [place]  Distant Location (provider location):  Home  Platform used for Video Visit: Marjorie Ramirez MD      HISTORY OF PRESENT ILLNESS  Rd Hyman is a 49 year old female with  who is here for follow up.    Interval History  Overall feeling good, good energy level  s/p both upper eyelid retraction repair and Bilateral lower  orbital fat decompression on 02/06/2025, will have a Follow-up tomorrow  No eye symptoms  No problem swalllowing/ breathing/ voice change  No SE on MMI, currently at 5 mhg daily      Initial visit  Patient initially seen by Dr. Zayas and then by me.    She was diagnosed with COVID19 in 12/2023. After this she started to develop weight loss, her weight at that time was 168 lbs and now she is weighing 129 lbs. She feels colder than usual. She has been experiencing tremors, palpitations associated with shortness of breath. She also has noticed weakness most notable when she is climbing stairs. She has not noticed neck swelling..     Her eyelids have been swollen and with eye pain. No blurry vision or diplopia.     Since onset symptoms have been persistent.     Last month she presented to ER for evaluation of flank pain. Found to have a kidney stone. Recently her serum calcium was 10.8. She has no prior history of hypercalcemia.     REVIEW OF SYSTEMS  10 point negative except as mentioned in HPI    Past Medical/Surgical History:  Past Medical History:   Diagnosis Date     Graves disease      Infertility      Thyroid eye disease      Past Surgical History:   Procedure Laterality Date     DILATION AND CURETTAGE   06/10/2014     DILATION AND CURETTAGE N/A 6/10/2014    Procedure: DILATION AND CURETTAGE;  Surgeon: Toni Shepard MD;  Location: [facility] OR;  Service:      ORBITOTOMY DECOMPRESSION Bilateral 2/6/2025    Procedure: Bilateral lower orbital fat decompression;  Surgeon: Will Smith MD;  Location: [facility] [place]     REPAIR RETRACTION LID BILATERAL Bilateral 2/6/2025    Procedure: Both upper eyelid retraction repair;  Surgeon: Will Smith MD;  Location: [facility] [place]       Medications  Current Outpatient Medications   Medication Sig Dispense Refill     Cholecalciferol (VITAMIN D-3) 25 MCG (1000 UT) CAPS        ibuprofen (ADVIL/MOTRIN) 600 MG tablet Take 1 tablet (600 mg) by mouth every 8 hours as needed for moderate pain 30 tablet 0     methimazole (TAPAZOLE) 5 MG tablet Take 1 tablet (5 mg) by mouth daily. 90 tablet 1     multivitamin w/minerals (THERA-VIT-M) tablet Take by mouth daily       sertraline (ZOLOFT) 25 MG tablet Take 1 tablet (25 mg) by mouth daily. 90 tablet 1     triamcinolone (KENALOG) 0.1 % external cream Apply topically 2 times daily       No current facility-administered medications for this visit.       Allergies  Allergies   Allergen Reactions     Cat Dander Other (See Comments)     Dust Mites          Family History  family history is not on file.    Social History  Social History     Tobacco Use     Smoking status: Never     Passive exposure: Current ( smokes outside)     Smokeless tobacco: Never   Substance Use Topics     Alcohol use: Not Currently       Physical Exam  There were no vitals taken for this visit.  There is no height or weight on file to calculate BMI.  GENERAL :  In no apparent distress  RESP: Normal breathing  NEURO: awake, alert, responds appropriately to questions.      DATA REVIEW  Labs/Imaging     TSH   Date Value Ref Range Status   01/14/2025 0.02 (L) 0.30 - 4.20 uIU/mL Final      Free T4   Date Value Ref Range Status   01/14/2025 1.57 0.90 - 1.70 ng/dL Final       Thyrotropin Receptor Antibody   Date Value Ref Range Status   11/12/2024 2.69 (H) 0.00 - 1.75 IU/L Final     Comment:        -------------------ADDITIONAL INFORMATION-------------------  At a decision limit of 1.75 IU/L, this assay   has 97% sensitivity and 99% specificity for   detection of Graves' disease. In healthy   individuals and in patients with thyroid disease   without diagnosis of Graves' disease, the upper   limit of anti-TSHR values are 1.22 IU/L and   1.58 IU/L, respectively (97.5th percentiles).     Test Performed by:  [facility]  [address] [place] [address]  : Lisa Fuller Ph.D.; CLIA# [id number]     AST   Date Value Ref Range Status   11/01/2024 25 0 - 45 U/L Final     ALT   Date Value Ref Range Status   11/01/2024 17 0 - 50 U/L Final     Alkaline Phosphatase   Date Value Ref Range Status   11/01/2024 175 (H) 40 - 150 U/L Final     WBC Count   Date Value Ref Range Status   01/28/2025 8.6 4.0 - 11.0 10e3/uL Final     EXAM: US THYROID  LOCATION: [facility]  DATE: 7/12/2024     INDICATION: worsening neck enlargement  COMPARISON: Thyroid ultrasound 3/25/2024  TECHNIQUE: Thyroid ultrasound.      FINDINGS:  RIGHT lobe: 6 x 2.4 x 3.4 cm, compared to 6.4 x 2.2 x 2.8 cm Homogeneous echotexture.   Isthmus: 7 mm.  LEFT lobe: 6.5 x 2.6 x 3.3 cm, compared to 6.7 x 2.4 x 2.7. Homogeneous echotexture. Several small parenchymal calcifications are present mid to low left thyroid lobe measuring up to 6 mm in greatest dimension.     NECK: No cervical lymphadenopathy.     NODULES:     Nodule 1: Coarse calcified nodule right upper lobe measures 7 x 8 x 7 mm, unchanged.   Composition: Solid or almost completely solid, 2 points   Echogenicity: Unable to determine, 1 point   Shape: Wider-than-tall, 0 points   Margin: Smooth, 0 points   Echogenic Foci: Macrocalcifications, 1 point   Point Total: 4-6 points. TI-RADS 4. If  1.5 cm or larger, recommend FNA; if 1 cm or larger, follow up US (annually for 5 years).      Nodule 2: Solid, hyperechoic nodule superior left thyroid measures 12 x 11 x 11 mm. In retrospect this nodule was present on the prior ultrasound and is unchanged.  Composition: Solid or almost completely solid, 2 points   Echogenicity: Hyperechoic or isoechoic, 1 point   Shape: Not taller than wide, 0 points   Margin: Smooth, 0 points   Echogenic Foci: None, or large comet-tail artifacts, 0 points   Point Total: 3 points. TI-RADS 3. If 2.5 cm or larger, recommend FNA; if 1.5 cm or larger, recommend follow up US at 1, 3, and 5 years.                                                                      IMPRESSION:     Unchanged densely calcified right thyroid nodule and circumscribed hyperechoic left thyroid nodule. These are overall benign-appearing. No specific additional imaging workup or follow-up is suggested per TI RADS guidelines.    ASSESSMENT/PLAN:   ## Graves' disease, start MMI 5/23/2024  ## Graves' eye disease, s/p both upper eyelid retraction repair and Bilateral lower  orbital fat decompression on 02/06/2025  ## H/o kidney stone  ## Thyroid nodules, US 7/2024  Doing well on MMI 5 mg daily  - labs now and again 3 months  - thyroid US 3 months  - Follow-up 8/2025    Information for patients on Tapazole or Propylthiouracil (PTU)  The generic name for Tapazole is methimazole.      The drugs, Tapazole and PTU are used to treat an overactive thyroid (hyperthyroidism).      They prevent the thyroid from making too much thyroid hormone.  You can think of them as putting up a road block in your thyroid.    These drugs are usually well tolerated and safe, but rarely can cause serious side effects.  The two worst potential side-effects are:  agranulocytosis.  This is the loss of the white blood cells that fight infection.  This can occur in approximately 1 in 200 people.  liver problems.  This is even more rare than  agranulocytosis but it can be very serious.    Because of the serious nature of the rare side-effects, you should notify your doctor if you develop the following symptoms while taking the drug:  Fever  sore throat  flu-like symptoms (nausea, vomiting, diarrhea, aches, abdominal pain)    In addition, anytime you have evidence of infection, you should get a blood test to be sure that you do not have agranulocytosis.  A prescription will be provided to you to get this blood test as needed.  This is an extra precaution and the results should be available the same day the blood test is drawn.  If your blood count is OK (which it almost always is), then you may continue to take the antithyroid drug.    While taking this medication, your doctor will probably want to see you frequently, every 1-2 months, at least for a time.  This is important so that the response can be monitored and the dose can be adjusted.      If you have concerns you may reach us at [phone] during regular hours, and [phone] (ask for endocrinologist on call) after hours.      Orders Placed This Encounter   Procedures     TSH with free T4 reflex     The longitudinal plan of care for the diagnosis(es)/condition(s) as documented were addressed during this visit. Due to the added complexity in care, I will continue to support Paulina in the subsequent management and with ongoing continuity of care.        Again, thank you for allowing me to participate in the care of your patient.        Sincerely,        Danilo Ramirez MD    Electronically signed

## 2025-04-28 NOTE — NURSING NOTE
Current patient location:  [place]    Is the patient currently in the state of [place]? YES    Visit mode: VIDEO    If the visit is dropped, the patient can be reconnected by:VIDEO VISIT: Text to cell phone:   Telephone Information:   Mobile [phone]       Will anyone else be joining the visit? NO  (If patient encounters technical issues they should call [phone]    Are changes needed to the allergy or medication list? No    Are refills needed on medications prescribed by this physician? NO    Rooming Documentation:  Questionnaire(s) completed    Reason for visit: RECHECK    Stephanie MIKE

## 2025-04-29 ENCOUNTER — OFFICE VISIT (OUTPATIENT)
Dept: OPHTHALMOLOGY | Facility: CLINIC | Age: 50
End: 2025-04-29
Payer: COMMERCIAL

## 2025-04-29 ENCOUNTER — LAB (OUTPATIENT)
Dept: LAB | Facility: CLINIC | Age: 50
End: 2025-04-29
Payer: COMMERCIAL

## 2025-04-29 DIAGNOSIS — E05.90 THYROTOXICOSIS WITHOUT THYROID STORM, UNSPECIFIED THYROTOXICOSIS TYPE: ICD-10-CM

## 2025-04-29 DIAGNOSIS — Z98.890 POSTOPERATIVE EYE STATE: Primary | ICD-10-CM

## 2025-04-29 PROCEDURE — 84439 ASSAY OF FREE THYROXINE: CPT

## 2025-04-29 PROCEDURE — 84443 ASSAY THYROID STIM HORMONE: CPT

## 2025-04-29 PROCEDURE — 36415 COLL VENOUS BLD VENIPUNCTURE: CPT

## 2025-04-29 ASSESSMENT — EXTERNAL EXAM - LEFT EYE: OS_EXAM: NORMAL

## 2025-04-29 ASSESSMENT — VISUAL ACUITY
OD_CC: 20/40
OS_CC+: -2
CORRECTION_TYPE: GLASSES
OS_CC: 20/20
METHOD: SNELLEN - LINEAR
OD_PH_CC+: -3
OD_PH_CC: 20/25
OD_CC+: -1

## 2025-04-29 ASSESSMENT — SLIT LAMP EXAM - LIDS
COMMENTS: INCISION C/D/I
COMMENTS: INCISION C/D/I

## 2025-04-29 ASSESSMENT — EXTERNAL EXAM - RIGHT EYE: OD_EXAM: NORMAL

## 2025-04-29 ASSESSMENT — TONOMETRY
IOP_METHOD: ICARE
OD_IOP_MMHG: 10
OS_IOP_MMHG: 10

## 2025-04-29 NOTE — NURSING NOTE
Chief Complaints and History of Present Illnesses   Patient presents with    Follow Up     Patient present for a follow up due S/p Both upper eyelid retraction repair Bilateral / Bilateral lower orbital fat decompression 2/6/25         Chief Complaint(s) and History of Present Illness(es)       Follow Up              Laterality: both eyes    Treatments tried: no treatments    Pain scale: 0/10    Comments: Patient present for a follow up due S/p Both upper eyelid retraction repair Bilateral / Bilateral lower orbital fat decompression 2/6/25                  Comments    Patient reports that she is doing well after procedure.   There is s little bump on incisison line of TAMY and question if this is normal or a possible scar. No discomfort with this.   Vision has remained stable after the procedure . No issues with double vision.    Kianna Ponce, COT COT 1:48 PM April 29, 2025

## 2025-04-29 NOTE — PROGRESS NOTES
Chief Complaints and History of Present Illnesses   Patient presents with    Follow Up     Patient present for a follow up due S/p Both upper eyelid retraction repair Bilateral / Bilateral lower orbital fat decompression 2/6/25         Chief Complaint(s) and History of Present Illness(es)     Follow Up    In both eyes.  Treatments tried include no treatments.  Pain was noted as   0/10. Additional comments: Patient present for a follow up due S/p Both   upper eyelid retraction repair Bilateral / Bilateral lower orbital fat   decompression 2/6/25        Comments    Patient reports that she is doing well after procedure.   There is s little bump on incisison line of TAMY and question if this is   normal or a possible scar. No discomfort with this.   Vision has remained stable after the procedure . No issues with double   vision.    Kiannasa Ponce, COT COT 1:48 PM April 29, 2025          Assessment & Plan     Rd Hyman is a 49 year old female with the following diagnoses:   1. Postoperative eye state       Rd Hyman is 2 months status post bilateral fat decompression and BUL retraction repair (2/6/25)  The incision(s) are well healed. Incision TAMY slightly more raised centrally than incision RUL.  The lid(s) are in excellent position.    I have recommended:  - Massage along TAMY incision with bland lubricating ointment. Scar should smoothen out over time. May use scar cream if interested.  - Artificial tears and warm compresses for comfort as needed  - Follow up with oculoplastics as needed    Amara Conde MD  Oculoplastic Surgery Fellow, [facility]      Attending Physician Attestation: Complete documentation of historical and exam elements from today's encounter can be found in the full encounter summary report (not reduplicated in this progress note). I personally obtained the chief complaint(s) and history of present illness. I confirmed and edited as necessary the review of systems, past  medical/surgical history, family history, social history, and examination findings as documented by others; and I examined the patient myself. I personally reviewed the relevant tests, images, and reports as documented above. I formulated and edited as necessary the assessment and plan and discussed the findings and management plan with the patient and family.  -Will Smith MD

## 2025-04-30 LAB
T4 FREE SERPL-MCNC: 0.97 NG/DL (ref 0.9–1.7)
TSH SERPL DL<=0.005 MIU/L-ACNC: 0.02 UIU/ML (ref 0.3–4.2)

## 2025-05-11 ENCOUNTER — RESULTS FOLLOW-UP (OUTPATIENT)
Dept: ENDOCRINOLOGY | Facility: CLINIC | Age: 50
End: 2025-05-11
Payer: COMMERCIAL

## 2025-05-11 DIAGNOSIS — E05.90 THYROTOXICOSIS WITHOUT THYROID STORM, UNSPECIFIED THYROTOXICOSIS TYPE: ICD-10-CM

## 2025-05-11 RX ORDER — METHIMAZOLE 5 MG/1
2.5 TABLET ORAL DAILY
Qty: 90 TABLET | Refills: 1 | Status: SHIPPED | OUTPATIENT
Start: 2025-05-11 | End: 2025-08-09

## 2025-07-07 ENCOUNTER — ANCILLARY PROCEDURE (OUTPATIENT)
Dept: ULTRASOUND IMAGING | Facility: CLINIC | Age: 50
End: 2025-07-07
Attending: INTERNAL MEDICINE
Payer: COMMERCIAL

## 2025-07-07 DIAGNOSIS — E05.90 THYROTOXICOSIS WITHOUT THYROID STORM, UNSPECIFIED THYROTOXICOSIS TYPE: ICD-10-CM

## 2025-07-07 PROCEDURE — 76536 US EXAM OF HEAD AND NECK: CPT | Mod: TC | Performed by: RADIOLOGY

## 2025-08-08 ENCOUNTER — ANCILLARY PROCEDURE (OUTPATIENT)
Dept: ULTRASOUND IMAGING | Facility: CLINIC | Age: 50
End: 2025-08-08
Attending: INTERNAL MEDICINE
Payer: COMMERCIAL

## 2025-08-08 DIAGNOSIS — E05.90 THYROTOXICOSIS WITHOUT THYROID STORM, UNSPECIFIED THYROTOXICOSIS TYPE: ICD-10-CM

## 2025-08-08 PROCEDURE — 76536 US EXAM OF HEAD AND NECK: CPT | Mod: TC | Performed by: RADIOLOGY

## 2025-08-09 ENCOUNTER — HEALTH MAINTENANCE LETTER (OUTPATIENT)
Age: 50
End: 2025-08-09

## 2025-08-20 ENCOUNTER — VIRTUAL VISIT (OUTPATIENT)
Dept: ENDOCRINOLOGY | Facility: CLINIC | Age: 50
End: 2025-08-20
Payer: COMMERCIAL

## 2025-08-20 DIAGNOSIS — E05.90 THYROTOXICOSIS WITHOUT THYROID STORM, UNSPECIFIED THYROTOXICOSIS TYPE: Primary | ICD-10-CM

## (undated) RX ORDER — OXYCODONE HYDROCHLORIDE 5 MG/1
TABLET ORAL
Status: DISPENSED
Start: 2025-02-06

## (undated) RX ORDER — FENTANYL CITRATE 50 UG/ML
INJECTION, SOLUTION INTRAMUSCULAR; INTRAVENOUS
Status: DISPENSED
Start: 2025-02-06

## (undated) RX ORDER — HYDROMORPHONE HYDROCHLORIDE 1 MG/ML
INJECTION, SOLUTION INTRAMUSCULAR; INTRAVENOUS; SUBCUTANEOUS
Status: DISPENSED
Start: 2025-02-06

## (undated) RX ORDER — ACETAMINOPHEN 325 MG/1
TABLET ORAL
Status: DISPENSED
Start: 2025-02-06

## (undated) RX ORDER — TRANEXAMIC ACID 100 MG/ML
INJECTION, SOLUTION INTRAVENOUS
Status: DISPENSED
Start: 2025-02-06